# Patient Record
Sex: MALE | Race: WHITE | Employment: OTHER | URBAN - METROPOLITAN AREA
[De-identification: names, ages, dates, MRNs, and addresses within clinical notes are randomized per-mention and may not be internally consistent; named-entity substitution may affect disease eponyms.]

---

## 2019-02-15 ENCOUNTER — HOSPITAL ENCOUNTER (EMERGENCY)
Age: 74
Discharge: HOME | End: 2019-02-16
Attending: EMERGENCY MEDICINE | Admitting: EMERGENCY MEDICINE
Payer: MEDICARE

## 2019-02-15 ENCOUNTER — APPOINTMENT (OUTPATIENT)
Dept: CT IMAGING | Age: 74
End: 2019-02-15
Attending: EMERGENCY MEDICINE
Payer: MEDICARE

## 2019-02-15 DIAGNOSIS — K58.0 IRRITABLE BOWEL SYNDROME WITH DIARRHEA: Primary | ICD-10-CM

## 2019-02-15 LAB
COMMENT, HOLDF: NORMAL
ERYTHROCYTE [DISTWIDTH] IN BLOOD BY AUTOMATED COUNT: 18.8 % (ref 11.5–14.5)
HCT VFR BLD AUTO: 39.2 % (ref 36.6–50.3)
HGB BLD-MCNC: 12.1 G/DL (ref 12.1–17)
MCH RBC QN AUTO: 26 PG (ref 26–34)
MCHC RBC AUTO-ENTMCNC: 30.9 G/DL (ref 30–36.5)
MCV RBC AUTO: 84.3 FL (ref 80–99)
NRBC # BLD: 0 K/UL (ref 0–0.01)
NRBC BLD-RTO: 0 PER 100 WBC
PLATELET # BLD AUTO: 175 K/UL (ref 150–400)
PMV BLD AUTO: 12.7 FL (ref 8.9–12.9)
RBC # BLD AUTO: 4.65 M/UL (ref 4.1–5.7)
SAMPLES BEING HELD,HOLD: NORMAL
WBC # BLD AUTO: 8.2 K/UL (ref 4.1–11.1)

## 2019-02-15 PROCEDURE — 80053 COMPREHEN METABOLIC PANEL: CPT

## 2019-02-15 PROCEDURE — 36415 COLL VENOUS BLD VENIPUNCTURE: CPT

## 2019-02-15 PROCEDURE — 96374 THER/PROPH/DIAG INJ IV PUSH: CPT

## 2019-02-15 PROCEDURE — 83690 ASSAY OF LIPASE: CPT

## 2019-02-15 PROCEDURE — 96361 HYDRATE IV INFUSION ADD-ON: CPT

## 2019-02-15 PROCEDURE — 74176 CT ABD & PELVIS W/O CONTRAST: CPT

## 2019-02-15 PROCEDURE — 99284 EMERGENCY DEPT VISIT MOD MDM: CPT

## 2019-02-15 PROCEDURE — 85027 COMPLETE CBC AUTOMATED: CPT

## 2019-02-15 PROCEDURE — 74011250636 HC RX REV CODE- 250/636

## 2019-02-15 PROCEDURE — 74011250636 HC RX REV CODE- 250/636: Performed by: EMERGENCY MEDICINE

## 2019-02-15 RX ORDER — ONDANSETRON 2 MG/ML
4 INJECTION INTRAMUSCULAR; INTRAVENOUS ONCE
Status: COMPLETED | OUTPATIENT
Start: 2019-02-15 | End: 2019-02-15

## 2019-02-15 RX ORDER — ONDANSETRON 2 MG/ML
INJECTION INTRAMUSCULAR; INTRAVENOUS
Status: COMPLETED
Start: 2019-02-15 | End: 2019-02-15

## 2019-02-15 RX ADMIN — SODIUM CHLORIDE 1000 ML: 900 INJECTION, SOLUTION INTRAVENOUS at 23:21

## 2019-02-15 RX ADMIN — ONDANSETRON 4 MG: 2 INJECTION INTRAMUSCULAR; INTRAVENOUS at 23:20

## 2019-02-16 VITALS
HEIGHT: 67 IN | WEIGHT: 240 LBS | DIASTOLIC BLOOD PRESSURE: 64 MMHG | RESPIRATION RATE: 16 BRPM | SYSTOLIC BLOOD PRESSURE: 136 MMHG | TEMPERATURE: 97.8 F | BODY MASS INDEX: 37.67 KG/M2 | HEART RATE: 98 BPM | OXYGEN SATURATION: 94 %

## 2019-02-16 LAB
ALBUMIN SERPL-MCNC: 2.9 G/DL (ref 3.5–5)
ALBUMIN/GLOB SERPL: 0.6 {RATIO} (ref 1.1–2.2)
ALP SERPL-CCNC: 120 U/L (ref 45–117)
ALT SERPL-CCNC: 46 U/L (ref 12–78)
ANION GAP SERPL CALC-SCNC: 14 MMOL/L (ref 5–15)
AST SERPL-CCNC: 50 U/L (ref 15–37)
BILIRUB SERPL-MCNC: 0.5 MG/DL (ref 0.2–1)
BUN SERPL-MCNC: 13 MG/DL (ref 6–20)
BUN/CREAT SERPL: 10 (ref 12–20)
CALCIUM SERPL-MCNC: 10.9 MG/DL (ref 8.5–10.1)
CHLORIDE SERPL-SCNC: 105 MMOL/L (ref 97–108)
CO2 SERPL-SCNC: 21 MMOL/L (ref 21–32)
CREAT SERPL-MCNC: 1.26 MG/DL (ref 0.7–1.3)
GLOBULIN SER CALC-MCNC: 4.5 G/DL (ref 2–4)
GLUCOSE SERPL-MCNC: 113 MG/DL (ref 65–100)
LIPASE SERPL-CCNC: 133 U/L (ref 73–393)
POTASSIUM SERPL-SCNC: 3.5 MMOL/L (ref 3.5–5.1)
PROT SERPL-MCNC: 7.4 G/DL (ref 6.4–8.2)
SODIUM SERPL-SCNC: 140 MMOL/L (ref 136–145)

## 2019-02-16 RX ORDER — ONDANSETRON 4 MG/1
4 TABLET, ORALLY DISINTEGRATING ORAL
Qty: 8 TAB | Refills: 0 | Status: SHIPPED | OUTPATIENT
Start: 2019-02-16 | End: 2020-04-28

## 2019-02-16 NOTE — ED TRIAGE NOTES
Pt arrives via EMS from home c/o lower abdominal pain with nausea and vomiting. Pt has a hx of IBS and other bowel issues.

## 2019-02-16 NOTE — DISCHARGE INSTRUCTIONS
Patient Education        Irritable Bowel Syndrome: Care Instructions  Your Care Instructions  Irritable bowel syndrome, or IBS, is a problem with the intestines that causes belly pain, bloating, gas, constipation, and diarrhea. The cause of IBS is not well known. IBS can last for many years, but it does not get worse over time or lead to serious disease. Most people can control their symptoms by changing their diet and reducing stress. Follow-up care is a key part of your treatment and safety. Be sure to make and go to all appointments, and call your doctor if you are having problems. It's also a good idea to know your test results and keep a list of the medicines you take. How can you care for yourself at home? · For constipation:  ? Include fruits, vegetables, beans, and whole grains in your diet each day. These foods are high in fiber. ? Drink plenty of fluids, enough so that your urine is light yellow or clear like water. If you have kidney, heart, or liver disease and have to limit fluids, talk with your doctor before you increase the amount of fluids you drink. ? Get some exercise every day. Build up slowly to 30 to 60 minutes a day on 5 or more days of the week. ? Take a fiber supplement, such as Citrucel or Metamucil, every day if needed. Read and follow all instructions on the label. ? Schedule time each day for a bowel movement. Having a daily routine may help. Take your time and do not strain when having a bowel movement. · If you often have diarrhea, limit foods and drinks that make it worse. These are different for each person but may include caffeine (found in coffee, tea, chocolate, and cola drinks), alcohol, fatty foods, gas-producing foods (such as beans, cabbage, and broccoli), some dairy products, and spicy foods. Do not eat candy or gum that contains sorbitol. · Keep a daily diary of what you eat and what symptoms you have. This may help find foods that cause you problems.   · Eat slowly. Try to make mealtime relaxing. · Find ways to reduce stress. · Get at least 30 minutes of exercise on most days of the week. Exercise can help reduce tension and prevent constipation. Walking is a good choice. You also may want to do other activities, such as running, swimming, cycling, or playing tennis or team sports. When should you call for help? Call your doctor now or seek immediate medical care if:    · Your pain is different than usual or occurs with fever.     · You lose weight without trying, or you lose your appetite and you do not know why.     · Your symptoms often wake you from sleep.     · Your stools are black and tarlike or have streaks of blood.    Watch closely for changes in your health, and be sure to contact your doctor if:    · Your IBS symptoms get worse or begin to disrupt your day-to-day life.     · You become more tired than usual.     · Your home treatment stops working. Where can you learn more? Go to http://aaron-jessica.info/. Enter B172 in the search box to learn more about \"Irritable Bowel Syndrome: Care Instructions. \"  Current as of: March 27, 2018  Content Version: 11.9  © 9948-8606 [employer]. Care instructions adapted under license by [employer] (which disclaims liability or warranty for this information). If you have questions about a medical condition or this instruction, always ask your healthcare professional. [address] any warranty or liability for your use of this information.

## 2019-02-16 NOTE — ED PROVIDER NOTES
75 y.o. male with past medical history significant for IBS and pshx of cholecystectomy who presents to the ED via EMS with chief complaint of 8/10 generalized abd pain with accompanying nausea, diarrhea and constipation, onset 30 days ago. No modifying factors noted. He notes that he has had diarrhea all day, and has been taking IBgard w/o resolution. Pt also notes that he has been taking Zofran w/o resolution of sx. He states that the abd pain became so unbearable that he had to call EMS. He denies fever, vomiting and hx of heart dz. He notes that he moved here from [place] 3 weeks ago to be close to his son. Pt lives at home alone and has no doctors here in [place]. There are no other acute medical concerns at this time. PCP: No primary care provider on file. Note written by Molly Reid, as dictated by Ondina Frost MD 10:11 PM  
 
 
The history is provided by the patient. No  was used. No past medical history on file. No past surgical history on file. No family history on file. Social History Socioeconomic History  Marital status: Not on file Spouse name: Not on file  Number of children: Not on file  Years of education: Not on file  Highest education level: Not on file Social Needs  Financial resource strain: Not on file  Food insecurity - worry: Not on file  Food insecurity - inability: Not on file  Transportation needs - medical: Not on file  Transportation needs - non-medical: Not on file Occupational History  Not on file Tobacco Use  Smoking status: Not on file Substance and Sexual Activity  Alcohol use: Not on file  Drug use: Not on file  Sexual activity: Not on file Other Topics Concern  Not on file Social History Narrative  Not on file ALLERGIES: Patient has no known allergies. Review of Systems Constitutional: Negative for diaphoresis and fever. HENT: Negative for facial swelling. Eyes: Negative for visual disturbance. Respiratory: Negative for cough. Cardiovascular: Negative for chest pain. Gastrointestinal: Positive for abdominal pain, constipation, diarrhea and vomiting. Genitourinary: Negative for dysuria. Musculoskeletal: Negative for joint swelling. Skin: Negative for rash. Neurological: Positive for light-headedness. Negative for headaches. Hematological: Negative for adenopathy. Psychiatric/Behavioral: Negative for suicidal ideas. There were no vitals filed for this visit. Physical Exam  
Constitutional: He is oriented to person, place, and time. He appears well-developed and well-nourished. No distress. Obese HENT:  
Head: Normocephalic and atraumatic. Mouth/Throat: Oropharynx is clear and moist.  
Eyes: Pupils are equal, round, and reactive to light. Neck: Normal range of motion. Neck supple. Cardiovascular: Normal rate, regular rhythm, normal heart sounds and intact distal pulses. Pulmonary/Chest: Effort normal and breath sounds normal. No respiratory distress. Abdominal: Soft. Bowel sounds are normal. He exhibits no distension. There is no tenderness. Musculoskeletal: Normal range of motion. He exhibits no edema. Neurological: He is alert and oriented to person, place, and time. Skin: Skin is warm and dry. Nursing note and vitals reviewed. Note written by Molly Murphy, as dictated by Claudette Lamb MD 10:11 PM  
 
MDM Number of Diagnoses or Management Options Irritable bowel syndrome with diarrhea:  
Diagnosis management comments: A:  Chronic diarrhea. Labs and CT unremarkable. P: 
Given info for PCP's 
F/u with GI 
 
 
  
 
Procedures

## 2019-03-05 ENCOUNTER — APPOINTMENT (OUTPATIENT)
Dept: GENERAL RADIOLOGY | Age: 74
DRG: 689 | End: 2019-03-05
Attending: EMERGENCY MEDICINE
Payer: MEDICARE

## 2019-03-05 ENCOUNTER — HOSPITAL ENCOUNTER (INPATIENT)
Age: 74
LOS: 1 days | Discharge: HOME | DRG: 689 | End: 2019-03-07
Attending: EMERGENCY MEDICINE | Admitting: INTERNAL MEDICINE
Payer: MEDICARE

## 2019-03-05 ENCOUNTER — APPOINTMENT (OUTPATIENT)
Dept: CT IMAGING | Age: 74
DRG: 689 | End: 2019-03-05
Attending: EMERGENCY MEDICINE
Payer: MEDICARE

## 2019-03-05 DIAGNOSIS — R41.82 ALTERED MENTAL STATUS, UNSPECIFIED ALTERED MENTAL STATUS TYPE: Primary | ICD-10-CM

## 2019-03-05 LAB
ALBUMIN SERPL-MCNC: 3 G/DL (ref 3.5–5)
ALBUMIN/GLOB SERPL: 0.7 {RATIO} (ref 1.1–2.2)
ALP SERPL-CCNC: 137 U/L (ref 45–117)
ALT SERPL-CCNC: 49 U/L (ref 12–78)
ANION GAP SERPL CALC-SCNC: 12 MMOL/L (ref 5–15)
AST SERPL-CCNC: 44 U/L (ref 15–37)
BASOPHILS # BLD: 0 K/UL (ref 0–0.1)
BASOPHILS NFR BLD: 0 % (ref 0–1)
BILIRUB SERPL-MCNC: 0.4 MG/DL (ref 0.2–1)
BUN SERPL-MCNC: 15 MG/DL (ref 6–20)
BUN/CREAT SERPL: 14 (ref 12–20)
CALCIUM SERPL-MCNC: 9.2 MG/DL (ref 8.5–10.1)
CHLORIDE SERPL-SCNC: 106 MMOL/L (ref 97–108)
CK SERPL-CCNC: 70 U/L (ref 39–308)
CO2 SERPL-SCNC: 24 MMOL/L (ref 21–32)
COMMENT, HOLDF: NORMAL
CREAT SERPL-MCNC: 1.11 MG/DL (ref 0.7–1.3)
DIFFERENTIAL METHOD BLD: ABNORMAL
EOSINOPHIL # BLD: 0.2 K/UL (ref 0–0.4)
EOSINOPHIL NFR BLD: 2 % (ref 0–7)
ERYTHROCYTE [DISTWIDTH] IN BLOOD BY AUTOMATED COUNT: 19.6 % (ref 11.5–14.5)
GLOBULIN SER CALC-MCNC: 4.6 G/DL (ref 2–4)
GLUCOSE BLD STRIP.AUTO-MCNC: 100 MG/DL (ref 65–100)
GLUCOSE SERPL-MCNC: 113 MG/DL (ref 65–100)
HCT VFR BLD AUTO: 42.8 % (ref 36.6–50.3)
HGB BLD-MCNC: 13.3 G/DL (ref 12.1–17)
IMM GRANULOCYTES # BLD AUTO: 0 K/UL (ref 0–0.04)
IMM GRANULOCYTES NFR BLD AUTO: 0 % (ref 0–0.5)
INR PPP: 1.1 (ref 0.9–1.1)
LACTATE SERPL-SCNC: 1.8 MMOL/L (ref 0.4–2)
LYMPHOCYTES # BLD: 2.6 K/UL (ref 0.8–3.5)
LYMPHOCYTES NFR BLD: 26 % (ref 12–49)
MCH RBC QN AUTO: 25.9 PG (ref 26–34)
MCHC RBC AUTO-ENTMCNC: 31.1 G/DL (ref 30–36.5)
MCV RBC AUTO: 83.4 FL (ref 80–99)
MONOCYTES # BLD: 1.5 K/UL (ref 0–1)
MONOCYTES NFR BLD: 15 % (ref 5–13)
NEUTS SEG # BLD: 5.5 K/UL (ref 1.8–8)
NEUTS SEG NFR BLD: 57 % (ref 32–75)
NRBC # BLD: 0 K/UL (ref 0–0.01)
NRBC BLD-RTO: 0 PER 100 WBC
PLATELET # BLD AUTO: 189 K/UL (ref 150–400)
PMV BLD AUTO: 11.5 FL (ref 8.9–12.9)
POTASSIUM SERPL-SCNC: 3.5 MMOL/L (ref 3.5–5.1)
PROT SERPL-MCNC: 7.6 G/DL (ref 6.4–8.2)
PROTHROMBIN TIME: 11.4 SEC (ref 9–11.1)
RBC # BLD AUTO: 5.13 M/UL (ref 4.1–5.7)
SAMPLES BEING HELD,HOLD: NORMAL
SERVICE CMNT-IMP: NORMAL
SODIUM SERPL-SCNC: 142 MMOL/L (ref 136–145)
TROPONIN I SERPL-MCNC: <0.05 NG/ML
WBC # BLD AUTO: 9.8 K/UL (ref 4.1–11.1)

## 2019-03-05 PROCEDURE — 83605 ASSAY OF LACTIC ACID: CPT

## 2019-03-05 PROCEDURE — 99285 EMERGENCY DEPT VISIT HI MDM: CPT

## 2019-03-05 PROCEDURE — 80053 COMPREHEN METABOLIC PANEL: CPT

## 2019-03-05 PROCEDURE — 71045 X-RAY EXAM CHEST 1 VIEW: CPT

## 2019-03-05 PROCEDURE — 87040 BLOOD CULTURE FOR BACTERIA: CPT

## 2019-03-05 PROCEDURE — 93005 ELECTROCARDIOGRAM TRACING: CPT

## 2019-03-05 PROCEDURE — 70450 CT HEAD/BRAIN W/O DYE: CPT

## 2019-03-05 PROCEDURE — 96375 TX/PRO/DX INJ NEW DRUG ADDON: CPT

## 2019-03-05 PROCEDURE — 84443 ASSAY THYROID STIM HORMONE: CPT

## 2019-03-05 PROCEDURE — 85610 PROTHROMBIN TIME: CPT

## 2019-03-05 PROCEDURE — 96365 THER/PROPH/DIAG IV INF INIT: CPT

## 2019-03-05 PROCEDURE — 84484 ASSAY OF TROPONIN QUANT: CPT

## 2019-03-05 PROCEDURE — 36415 COLL VENOUS BLD VENIPUNCTURE: CPT

## 2019-03-05 PROCEDURE — 85025 COMPLETE CBC W/AUTO DIFF WBC: CPT

## 2019-03-05 PROCEDURE — 82550 ASSAY OF CK (CPK): CPT

## 2019-03-05 PROCEDURE — 82962 GLUCOSE BLOOD TEST: CPT

## 2019-03-06 ENCOUNTER — APPOINTMENT (OUTPATIENT)
Dept: CT IMAGING | Age: 74
DRG: 689 | End: 2019-03-06
Attending: FAMILY MEDICINE
Payer: MEDICARE

## 2019-03-06 PROBLEM — R41.82 ALTERED MENTAL STATUS: Status: ACTIVE | Noted: 2019-03-06

## 2019-03-06 LAB
ALBUMIN SERPL-MCNC: 2.8 G/DL (ref 3.5–5)
ALBUMIN/GLOB SERPL: 0.7 {RATIO} (ref 1.1–2.2)
ALP SERPL-CCNC: 126 U/L (ref 45–117)
ALT SERPL-CCNC: 44 U/L (ref 12–78)
AMMONIA PLAS-SCNC: 82 UMOL/L
ANION GAP SERPL CALC-SCNC: 12 MMOL/L (ref 5–15)
APPEARANCE UR: ABNORMAL
AST SERPL-CCNC: 42 U/L (ref 15–37)
ATRIAL RATE: 101 BPM
BACTERIA URNS QL MICRO: ABNORMAL /HPF
BASOPHILS # BLD: 0 K/UL (ref 0–0.1)
BASOPHILS NFR BLD: 0 % (ref 0–1)
BILIRUB DIRECT SERPL-MCNC: 0.1 MG/DL (ref 0–0.2)
BILIRUB SERPL-MCNC: 0.3 MG/DL (ref 0.2–1)
BILIRUB UR QL: NEGATIVE
BUN SERPL-MCNC: 15 MG/DL (ref 6–20)
BUN/CREAT SERPL: 15 (ref 12–20)
CALCIUM SERPL-MCNC: 9 MG/DL (ref 8.5–10.1)
CALCULATED P AXIS, ECG09: 27 DEGREES
CALCULATED R AXIS, ECG10: -79 DEGREES
CALCULATED T AXIS, ECG11: 30 DEGREES
CHLORIDE SERPL-SCNC: 110 MMOL/L (ref 97–108)
CK MB CFR SERPL CALC: 1.9 % (ref 0–2.5)
CK MB SERPL-MCNC: 1.3 NG/ML (ref 5–25)
CK SERPL-CCNC: 68 U/L (ref 39–308)
CO2 SERPL-SCNC: 21 MMOL/L (ref 21–32)
COLOR UR: ABNORMAL
CREAT SERPL-MCNC: 0.97 MG/DL (ref 0.7–1.3)
DIAGNOSIS, 93000: NORMAL
DIFFERENTIAL METHOD BLD: ABNORMAL
EOSINOPHIL # BLD: 0.3 K/UL (ref 0–0.4)
EOSINOPHIL NFR BLD: 3 % (ref 0–7)
EPITH CASTS URNS QL MICRO: ABNORMAL /LPF
ERYTHROCYTE [DISTWIDTH] IN BLOOD BY AUTOMATED COUNT: 19.3 % (ref 11.5–14.5)
GLOBULIN SER CALC-MCNC: 4.2 G/DL (ref 2–4)
GLUCOSE SERPL-MCNC: 97 MG/DL (ref 65–100)
GLUCOSE UR STRIP.AUTO-MCNC: NEGATIVE MG/DL
HCT VFR BLD AUTO: 39.5 % (ref 36.6–50.3)
HGB BLD-MCNC: 12.6 G/DL (ref 12.1–17)
HGB UR QL STRIP: ABNORMAL
IMM GRANULOCYTES # BLD AUTO: 0 K/UL (ref 0–0.04)
IMM GRANULOCYTES NFR BLD AUTO: 0 % (ref 0–0.5)
KETONES UR QL STRIP.AUTO: ABNORMAL MG/DL
LEUKOCYTE ESTERASE UR QL STRIP.AUTO: ABNORMAL
LIPASE SERPL-CCNC: 114 U/L (ref 73–393)
LYMPHOCYTES # BLD: 2.9 K/UL (ref 0.8–3.5)
LYMPHOCYTES NFR BLD: 30 % (ref 12–49)
MAGNESIUM SERPL-MCNC: 1.9 MG/DL (ref 1.6–2.4)
MCH RBC QN AUTO: 26.8 PG (ref 26–34)
MCHC RBC AUTO-ENTMCNC: 31.9 G/DL (ref 30–36.5)
MCV RBC AUTO: 84 FL (ref 80–99)
MONOCYTES # BLD: 1.4 K/UL (ref 0–1)
MONOCYTES NFR BLD: 15 % (ref 5–13)
NEUTS SEG # BLD: 5.2 K/UL (ref 1.8–8)
NEUTS SEG NFR BLD: 53 % (ref 32–75)
NITRITE UR QL STRIP.AUTO: NEGATIVE
NRBC # BLD: 0 K/UL (ref 0–0.01)
NRBC BLD-RTO: 0 PER 100 WBC
P-R INTERVAL, ECG05: 146 MS
PH UR STRIP: 7 [PH] (ref 5–8)
PHOSPHATE SERPL-MCNC: 2.9 MG/DL (ref 2.6–4.7)
PLATELET # BLD AUTO: 182 K/UL (ref 150–400)
PMV BLD AUTO: 12 FL (ref 8.9–12.9)
POTASSIUM SERPL-SCNC: 3.2 MMOL/L (ref 3.5–5.1)
PROT SERPL-MCNC: 7 G/DL (ref 6.4–8.2)
PROT UR STRIP-MCNC: ABNORMAL MG/DL
Q-T INTERVAL, ECG07: 344 MS
QRS DURATION, ECG06: 88 MS
QTC CALCULATION (BEZET), ECG08: 446 MS
RBC # BLD AUTO: 4.7 M/UL (ref 4.1–5.7)
RBC #/AREA URNS HPF: ABNORMAL /HPF (ref 0–5)
SODIUM SERPL-SCNC: 143 MMOL/L (ref 136–145)
SP GR UR REFRACTOMETRY: 1.02 (ref 1–1.03)
TROPONIN I SERPL-MCNC: <0.05 NG/ML
TSH SERPL DL<=0.05 MIU/L-ACNC: 3.19 UIU/ML (ref 0.36–3.74)
UR CULT HOLD, URHOLD: NORMAL
UROBILINOGEN UR QL STRIP.AUTO: 0.2 EU/DL (ref 0.2–1)
VENTRICULAR RATE, ECG03: 101 BPM
WBC # BLD AUTO: 9.8 K/UL (ref 4.1–11.1)
WBC URNS QL MICRO: >100 /HPF (ref 0–4)

## 2019-03-06 PROCEDURE — 82550 ASSAY OF CK (CPK): CPT

## 2019-03-06 PROCEDURE — 99218 HC RM OBSERVATION: CPT

## 2019-03-06 PROCEDURE — 74011250636 HC RX REV CODE- 250/636: Performed by: FAMILY MEDICINE

## 2019-03-06 PROCEDURE — 74011000258 HC RX REV CODE- 258: Performed by: FAMILY MEDICINE

## 2019-03-06 PROCEDURE — 85025 COMPLETE CBC W/AUTO DIFF WBC: CPT

## 2019-03-06 PROCEDURE — 80076 HEPATIC FUNCTION PANEL: CPT

## 2019-03-06 PROCEDURE — 82140 ASSAY OF AMMONIA: CPT

## 2019-03-06 PROCEDURE — 87077 CULTURE AEROBIC IDENTIFY: CPT

## 2019-03-06 PROCEDURE — 83690 ASSAY OF LIPASE: CPT

## 2019-03-06 PROCEDURE — 84100 ASSAY OF PHOSPHORUS: CPT

## 2019-03-06 PROCEDURE — 80048 BASIC METABOLIC PNL TOTAL CA: CPT

## 2019-03-06 PROCEDURE — 84484 ASSAY OF TROPONIN QUANT: CPT

## 2019-03-06 PROCEDURE — 74176 CT ABD & PELVIS W/O CONTRAST: CPT

## 2019-03-06 PROCEDURE — 81001 URINALYSIS AUTO W/SCOPE: CPT

## 2019-03-06 PROCEDURE — 36415 COLL VENOUS BLD VENIPUNCTURE: CPT

## 2019-03-06 PROCEDURE — 74011250637 HC RX REV CODE- 250/637: Performed by: FAMILY MEDICINE

## 2019-03-06 PROCEDURE — 87086 URINE CULTURE/COLONY COUNT: CPT

## 2019-03-06 PROCEDURE — 83735 ASSAY OF MAGNESIUM: CPT

## 2019-03-06 RX ORDER — SODIUM CHLORIDE 9 MG/ML
75 INJECTION, SOLUTION INTRAVENOUS CONTINUOUS
Status: DISCONTINUED | OUTPATIENT
Start: 2019-03-06 | End: 2019-03-07 | Stop reason: HOSPADM

## 2019-03-06 RX ORDER — POTASSIUM CHLORIDE 750 MG/1
40 TABLET, FILM COATED, EXTENDED RELEASE ORAL
Status: COMPLETED | OUTPATIENT
Start: 2019-03-06 | End: 2019-03-06

## 2019-03-06 RX ORDER — SODIUM CHLORIDE 0.9 % (FLUSH) 0.9 %
5-40 SYRINGE (ML) INJECTION AS NEEDED
Status: DISCONTINUED | OUTPATIENT
Start: 2019-03-06 | End: 2019-03-07 | Stop reason: HOSPADM

## 2019-03-06 RX ORDER — SODIUM CHLORIDE 0.9 % (FLUSH) 0.9 %
5-40 SYRINGE (ML) INJECTION EVERY 8 HOURS
Status: DISCONTINUED | OUTPATIENT
Start: 2019-03-06 | End: 2019-03-07 | Stop reason: HOSPADM

## 2019-03-06 RX ORDER — MORPHINE SULFATE 2 MG/ML
1 INJECTION, SOLUTION INTRAMUSCULAR; INTRAVENOUS
Status: COMPLETED | OUTPATIENT
Start: 2019-03-06 | End: 2019-03-06

## 2019-03-06 RX ORDER — ONDANSETRON 2 MG/ML
4 INJECTION INTRAMUSCULAR; INTRAVENOUS
Status: DISCONTINUED | OUTPATIENT
Start: 2019-03-06 | End: 2019-03-07 | Stop reason: HOSPADM

## 2019-03-06 RX ORDER — POTASSIUM CHLORIDE 7.45 MG/ML
10 INJECTION INTRAVENOUS
Status: DISCONTINUED | OUTPATIENT
Start: 2019-03-06 | End: 2019-03-06

## 2019-03-06 RX ADMIN — LACTULOSE 20 G: 20 SOLUTION ORAL at 09:06

## 2019-03-06 RX ADMIN — Medication 10 ML: at 05:59

## 2019-03-06 RX ADMIN — POTASSIUM CHLORIDE 10 MEQ: 7.46 INJECTION, SOLUTION INTRAVENOUS at 03:58

## 2019-03-06 RX ADMIN — Medication 10 ML: at 16:06

## 2019-03-06 RX ADMIN — MORPHINE SULFATE 1 MG: 2 INJECTION, SOLUTION INTRAMUSCULAR; INTRAVENOUS at 05:56

## 2019-03-06 RX ADMIN — SODIUM CHLORIDE 75 ML/HR: 900 INJECTION, SOLUTION INTRAVENOUS at 09:42

## 2019-03-06 RX ADMIN — ONDANSETRON 4 MG: 2 INJECTION INTRAMUSCULAR; INTRAVENOUS at 05:56

## 2019-03-06 RX ADMIN — LACTULOSE 20 G: 20 SOLUTION ORAL at 21:19

## 2019-03-06 RX ADMIN — MORPHINE SULFATE 1 MG: 2 INJECTION, SOLUTION INTRAMUSCULAR; INTRAVENOUS at 21:18

## 2019-03-06 RX ADMIN — LACTULOSE 20 G: 20 SOLUTION ORAL at 16:05

## 2019-03-06 RX ADMIN — CEFTRIAXONE 1 G: 1 INJECTION, POWDER, FOR SOLUTION INTRAMUSCULAR; INTRAVENOUS at 03:58

## 2019-03-06 RX ADMIN — Medication 10 ML: at 04:04

## 2019-03-06 RX ADMIN — Medication 10 ML: at 21:19

## 2019-03-06 RX ADMIN — POTASSIUM CHLORIDE 40 MEQ: 750 TABLET, FILM COATED, EXTENDED RELEASE ORAL at 04:54

## 2019-03-06 NOTE — PROGRESS NOTES
TRANSFER - IN REPORT:    Verbal report received from Alison Mathew RN(name) on [employer]  being received from ED(unit) for routine progression of care      Report consisted of patients Situation, Background, Assessment and   Recommendations(SBAR). Information from the following report(s) SBAR, ED Summary, MAR, Recent Results and Med Rec Status was reviewed with the receiving nurse. Opportunity for questions and clarification was provided. Assessment completed upon patients arrival to unit and care assumed.

## 2019-03-06 NOTE — PROGRESS NOTES
Problem: Pressure Injury - Risk of  Goal: *Prevention of pressure injury  Document Murphy Scale and appropriate interventions in the flowsheet. Outcome: Progressing Towards Goal  Pressure Injury Interventions:  Sensory Interventions: Assess changes in LOC, Float heels, Keep linens dry and wrinkle-free, Minimize linen layers    Moisture Interventions: Absorbent underpads    Activity Interventions: Increase time out of bed    Mobility Interventions: Pressure redistribution bed/mattress (bed type)    Nutrition Interventions: Document food/fluid/supplement intake, Offer support with meals,snacks and hydration    Friction and Shear Interventions: Minimize layers               Problem: Falls - Risk of  Goal: *Absence of Falls  Document Edouard Fall Risk and appropriate interventions in the flowsheet.   Outcome: Progressing Towards Goal  Fall Risk Interventions:  Mobility Interventions: Communicate number of staff needed for ambulation/transfer, Patient to call before getting OOB         Medication Interventions: Patient to call before getting OOB, Teach patient to arise slowly    Elimination Interventions: Call light in reach, Patient to call for help with toileting needs, Toilet paper/wipes in reach

## 2019-03-06 NOTE — PROGRESS NOTES
TRANSFER - OUT REPORT:    Verbal report given to Michi RN(name) on [employer]  being transferred to 6 E(unit) for routine progression of care       Report consisted of patients Situation, Background, Assessment and   Recommendations(SBAR). Information from the following report(s) SBAR, Kardex, ED Summary, Intake/Output and MAR was reviewed with the receiving nurse. Lines:   Peripheral IV 03/05/19 Left Other(comment) (Active)   Site Assessment Clean, dry, & intact 3/6/2019 12:08 PM   Phlebitis Assessment 0 3/6/2019 12:08 PM   Infiltration Assessment 0 3/6/2019 12:08 PM   Dressing Status Clean, dry, & intact 3/6/2019 12:08 PM   Dressing Type Transparent 3/6/2019 12:08 PM   Hub Color/Line Status Green 3/6/2019 12:08 PM   Alcohol Cap Used Yes 3/6/2019 12:08 PM       Peripheral IV 03/05/19 Right Wrist (Active)   Site Assessment Clean, dry, & intact 3/6/2019 12:08 PM   Phlebitis Assessment 0 3/6/2019 12:08 PM   Infiltration Assessment 0 3/6/2019 12:08 PM   Dressing Status Clean, dry, & intact 3/6/2019 12:08 PM   Dressing Type Transparent 3/6/2019 12:08 PM   Hub Color/Line Status Pink 3/6/2019 12:08 PM   Action Taken Blood drawn 3/5/2019 10:34 PM   Alcohol Cap Used Yes 3/6/2019 12:08 PM        Opportunity for questions and clarification was provided.       Patient transported with:   [employer]

## 2019-03-06 NOTE — ED NOTES
2350 pt voided ~250ml clear, yellow urine in urinal independently; urine obtained and sent    0420 pt not tolerating IV potassium, paged Dr. Jesu Griffin, [facility] to give PO since pt passed dysphagia screening    0540 pt requesting assistance with urinal, 1mg Ativan, zofran, and something for belly pain; paged Dr. Jesu Griffin

## 2019-03-06 NOTE — PROGRESS NOTES
Problem: Falls - Risk of  Goal: *Absence of Falls  Document Edouard Fall Risk and appropriate interventions in the flowsheet.   Outcome: Progressing Towards Goal  Fall Risk Interventions:  Mobility Interventions: Communicate number of staff needed for ambulation/transfer         Medication Interventions: Patient to call before getting OOB    Elimination Interventions: Call light in reach

## 2019-03-06 NOTE — ED PROVIDER NOTES
75 y.o. male with PMHx significant for IBS who presents from home via EMS with chief complaint of altered mental status. EMS reports pt called 911 thinking he had called his son and police were sent to his apartment, where he was found laying in bed. Per EMS, pt lives alone at home and allegedly has a home health nurse who goes to his home 3x/week, but no one has seen her since 2/28/19. EMS reports that the pt has likely been mostly in bed and has not been eating or drinking. EMS reports the pt complained of back pain, abdominal pain, dysuria, chest pain, and headache. EMS reports the pt received 400 cc of NS en route and decreased his tachycardia from 130's to 100's. EMS reports his blood sugar was 146 and temperature was 98.6 F tympanic. EMS reports police tried to call his son and left a voicemail. Pt states that he has been drinking some water, but has not been eating. Pt denies any falls. Full history, physical exam, and ROS unable to be obtained due to:  AMS. There are no other acute medical concerns at this time    Note written by Molly Prabhakar, as dictated by Alie Benjamin MD 10:16 PM.          The history is provided by the patient and the EMS personnel. The history is limited by the condition of the patient. No  was used. No past medical history on file. Past Surgical History:   Procedure Laterality Date    HX KNEE REPLACEMENT      Pt stated         No family history on file.     Social History     Socioeconomic History    Marital status: SINGLE     Spouse name: Not on file    Number of children: Not on file    Years of education: Not on file    Highest education level: Not on file   Social Needs    Financial resource strain: Not on file    Food insecurity - worry: Not on file    Food insecurity - inability: Not on file    Transportation needs - medical: Not on file   [employer] needs - non-medical: Not on file   Occupational History    Not on file   Tobacco Use    Smoking status: Not on file   Substance and Sexual Activity    Alcohol use: Not on file    Drug use: Not on file    Sexual activity: Not on file   Other Topics Concern    Not on file   Social History Narrative    Not on file         ALLERGIES: Patient has no known allergies. Review of Systems   Unable to perform ROS: Mental status change       Vitals:    03/06/19 0600 03/06/19 0700 03/06/19 0805 03/06/19 1130   BP: 132/81 125/84 117/71 111/68   Pulse: 88 94 93 98   Resp: 21 20 16 21   Temp: 98.4 °F (36.9 °C)  98.2 °F (36.8 °C) 98.3 °F (36.8 °C)   SpO2: 94% 92% 93% 92%   Weight:       Height:                Physical Exam   Constitutional: He is oriented to person, place, and time. He appears well-developed and well-nourished. No distress. BIBEMS in bed sheets (soiled), NAD, AxOx2 (when asked year states '19' - unsure if he means 2019 or 23--, president is [facility]), speaking in complete sentences     HENT:   Head: Normocephalic and atraumatic. Mouth/Throat: Oropharynx is clear and moist. No oropharyngeal exudate. Cn intact    No facial droop/ slurred speech/ tongue deviation   Eyes: Conjunctivae and EOM are normal. Pupils are equal, round, and reactive to light. Right eye exhibits no discharge. Left eye exhibits no discharge. No scleral icterus. Neck: Normal range of motion. Neck supple. No JVD present. No tracheal deviation present. Cardiovascular: Normal rate, regular rhythm, normal heart sounds and intact distal pulses. Exam reveals no gallop and no friction rub. No murmur heard. Pulmonary/Chest: Effort normal and breath sounds normal. No stridor. No respiratory distress. He has no wheezes. He has no rales. He exhibits no tenderness. Abdominal: Soft. Bowel sounds are normal. He exhibits no distension and no mass. There is no tenderness. There is no rebound and no guarding. No hernia.    Genitourinary:   Genitourinary Comments: Pt denies urinary/ Testicular/ scrotal or penile  complaints   Musculoskeletal: Normal range of motion. He exhibits no edema, tenderness or deformity. Lymphadenopathy:     He has no cervical adenopathy. Neurological: He is alert and oriented to person, place, and time. He has normal strength. He displays normal reflexes. No cranial nerve deficit or sensory deficit. He exhibits normal muscle tone. He displays a negative Romberg sign. Coordination normal. GCS eye subscore is 4. GCS verbal subscore is 5. GCS motor subscore is 6. He displays no Babinski's sign on the right side. He displays no Babinski's sign on the left side. pt has motor/ CV/ Sensation grossly intact to all extremities, R = L in strength;   Skin: Skin is warm and dry. Capillary refill takes less than 2 seconds. No rash noted. No erythema. Psychiatric: He has a normal mood and affect. His mood appears not anxious. He is not agitated. Nursing note and vitals reviewed. MDM       Procedures    Chief Complaint   Patient presents with    Altered mental status       11:01 PM  The patients presenting problems have been discussed, and they are in agreement with the care plan formulated and outlined with them. I have encouraged them to ask questions as they arise throughout their visit. MEDICATIONS GIVEN:  Medications - No data to display    LABS REVIEWED:  Labs Reviewed   CBC WITH AUTOMATED DIFF - Abnormal; Notable for the following components:       Result Value    MCH 25.9 (*)     RDW 19.6 (*)     MONOCYTES 15 (*)     ABS.  MONOCYTES 1.5 (*)     All other components within normal limits   CULTURE, BLOOD, PAIRED   URINE CULTURE HOLD SAMPLE   TROPONIN I   METABOLIC PANEL, COMPREHENSIVE   PROTHROMBIN TIME + INR   LACTIC ACID   CK   SAMPLES BEING HELD   URINALYSIS W/MICROSCOPIC   GLUCOSE, POC       RADIOLOGY RESULTS:  The following have been ordered and reviewed:  _____________________________________________________________________  _____________________________________________________________________    EKG interpretation:   Rhythm: normal sinus rhythm; and regular . Rate (approx.): 100; Axis: LAD; P wave: normal; QRS interval: normal ; ST/T wave: normal; Negative acute significant segmental elevations/ no old    PROCEDURES:        CONSULTATIONS:       PROGRESS NOTES:      DIAGNOSIS:    1. Altered mental status, unspecified altered mental status type        PLAN:  1- AMS - unsure of etiology; will have evaluated for admission for further work-up/ monitoring/ CM as pt lives alone      ED COURSE: The patients hospital course has been uncomplicated. 11:32 PM  Patient is being evaluated for admission to the hospital by the hospitalist, Dr Jonel Fernández .  The results of their tests and reasons for their admission have been discussed with them and/or available family. They convey agreement and understanding for the need to be admitted and for their admission diagnosis. Consultation has been made with the inpatient physician specialist for hospitalization.

## 2019-03-06 NOTE — PROGRESS NOTES
Hospitalist Progress Note                               Jamey Schwartz MD                                     Answering service: [phone]                               OR [phone] from in house phone                                         Date of Service:  3/6/2019  NAME:  Holly Luong  :  1945  MRN:  [id number]      Admission Summary:     76 Y/O gentleman with a PMH significant for Irritable Bowel Syndrome was brought to the ER via EMS for altered mentation and abdominal pain. Reason for follow up:       CC: Altered mental status     Assessment & Plan:     1) CNS: Resolving Acute metabolic Encephalopathy due to severe symptomatic UTI and dehydration. CT head negative for acute intracranial findings. 2) ID: Severe Symptomatic UTI without any sepsis. Afebrile, cultures in progress. Empiric Ceftriaxone. 3) GI: Resoved  Generalized abdominal pain. CT ABD/Pelvis negative for any acute findings. 4) Resp: 6mm RML lung nodule seen on CT ABD/Pelvis. For outpatient F/U imaging. 5) Prophylaxis: DVT    6) Rehab: Debility. PT/OT eval    6) Directives: Full Code with a guarded prognosis. D/W patient. 7) Plan: Anticipate D/C on oral therapy in 1-2 days. Left message for patient's sister Marilynn at [phone]. D/W Nursing. Hospital Problems  Date Reviewed: 3/6/2019          Codes Class Noted POA    * (Principal) Altered mental status ICD-10-CM: R41.82  ICD-9-CM: 780.97  3/6/2019 Unknown              Physical Examination:      Last 24hrs VS reviewed since prior progress note. Most recent are:  Visit Vitals  /68 (BP 1 Location: Right arm, BP Patient Position: At rest)   Pulse 98   Temp 98.3 °F (36.8 °C)   Resp 21   Ht 5' 7\" (1.702 m)   Wt 108.9 kg (240 lb)   SpO2 92%   BMI 37.59 kg/m²           Constitutional:  No acute distress, cooperative, pleasant    HEENT: Head is a traumatic,  Un icteric sclera. Pink conjunctiva,no erythema or discharge. Oral mucous moist, oropharynx benign. Neck supple,    Resp:   Decreased air entry B/L. Limited exam due to poor effort   CV:  Regular rhythm, normal rate, no murmurs, gallops, rubs    GI:  Soft, non distended, non tender. normoactive bowel sounds, no hepatosplenomegaly    :  No CVA or suprapubic tenderness   Skin  :  No erythema,rash,bullae,dipigmentation     Musculoskeletal:  Bipedal edema. Neurologic:  Awake and alert. Intake/Output Summary (Last 24 hours) at 3/6/2019 1501  Last data filed at 3/6/2019 1208  Gross per 24 hour   Intake 531.25 ml   Output 1250 ml   Net -718.75 ml          Labs:     Recent Labs     03/06/19 0156 03/05/19 2238   WBC 9.8 9.8   HGB 12.6 13.3   HCT 39.5 42.8    189     Recent Labs     03/06/19 0156 03/05/19 2238    142   K 3.2* 3.5   * 106   CO2 21 24   BUN 15 15   CREA 0.97 1.11   GLU 97 113*   CA 9.0 9.2   MG 1.9  --    PHOS 2.9  --      Recent Labs     03/06/19 0150 03/05/19 2238   SGOT 42* 44*   ALT 44 49   * 137*   TBILI 0.3 0.4   TP 7.0 7.6   ALB 2.8* 3.0*   GLOB 4.2* 4.6*   LPSE 114  --      Recent Labs     03/05/19 2238   INR 1.1   PTP 11.4*      No results for input(s): FE, TIBC, PSAT, FERR in the last 72 hours. No results found for: FOL, RBCF   No results for input(s): PH, PCO2, PO2 in the last 72 hours.   Recent Labs     03/06/19  0908 03/06/19 0156 03/06/19 0150 03/05/19 2238   CPK  --   --  68 79   CKNDX  --   --  1.9  --    TROIQ <0.05 <0.05 <0.05 <0.05     No results found for: CHOL, CHOLX, CHLST, CHOLV, HDL, LDL, LDLC, DLDLP, TGLX, TRIGL, TRIGP, CHHD, CHHDX  Lab Results   Component Value Date/Time    Glucose (POC) 100 03/05/2019 10:43 PM     Lab Results   Component Value Date/Time    Color YELLOW/STRAW 03/06/2019 12:00 AM    Appearance CLOUDY (A) 03/06/2019 12:00 AM    Specific gravity 1.018 03/06/2019 12:00 AM    pH (UA) 7.0 03/06/2019 12:00 AM    Protein TRACE (A) 03/06/2019 12:00 AM    Glucose NEGATIVE 03/06/2019 12:00 AM    Ketone TRACE (A) 03/06/2019 12:00 AM    Bilirubin NEGATIVE  03/06/2019 12:00 AM    Urobilinogen 0.2 03/06/2019 12:00 AM    Nitrites NEGATIVE  03/06/2019 12:00 AM    Leukocyte Esterase LARGE (A) 03/06/2019 12:00 AM    Epithelial cells FEW 03/06/2019 12:00 AM    Bacteria 4+ (A) 03/06/2019 12:00 AM    WBC >100 (H) 03/06/2019 12:00 AM    RBC 5-10 03/06/2019 12:00 AM         Medications Reviewed:     Current Facility-Administered Medications   Medication Dose Route Frequency    sodium chloride (NS) flush 5-40 mL  5-40 mL IntraVENous Q8H    sodium chloride (NS) flush 5-40 mL  5-40 mL IntraVENous PRN    cefTRIAXone (ROCEPHIN) 1 g in 0.9% sodium chloride (MBP/ADV) 50 mL  1 g IntraVENous Q24H    lactulose (CHRONULAC) solution 20 g  20 g Oral TID    morphine injection 1 mg  1 mg IntraVENous Q6H PRN    ondansetron (ZOFRAN) injection 4 mg  4 mg IntraVENous Q6H PRN    0.9% sodium chloride infusion  75 mL/hr IntraVENous CONTINUOUS     Current Outpatient Medications   Medication Sig    ondansetron (ZOFRAN ODT) 4 mg disintegrating tablet Take 1 Tab by mouth every eight (8) hours as needed for Nausea.     ______________________________________________________________________  EXPECTED LENGTH OF STAY: - - -  ACTUAL LENGTH OF STAY:          0                 Malgorzata Rod MD

## 2019-03-06 NOTE — PROGRESS NOTES
Physical therapy consult received and acknowledged, chart reviewed, met with patient in the ED. Patient reports using a power scooter and performing own transfers at baseline. Patient is in the ED bed which will not lower to a wheelchair height. Physical therapy will defer evaluation until patient transfers to the floor and in a hospital bed to allow for assessment of transfers. Therapy will check back later today as able. 1530 - Patient remains in the ED. Therapy will check back tomorrow for evaluation.

## 2019-03-06 NOTE — ED TRIAGE NOTES
Pt arrives via EMS stretcher he was found in bed in apartment w/ AMS,  nobody had seen him since Thursday, he called 911; has received 400cc NS on arrival; VSS per EMS, blood sugar 146, temp 98.6 tympanic, Sinus Tach 130's on EMS arrival; c/o stomach, chest, back, head pain, and being thirsty; EMS reported that home health nurse is supposed to come a few times a week but no one has seen the RN; pt lives alone and police attempted to call pt's son who is in another city

## 2019-03-07 VITALS
RESPIRATION RATE: 18 BRPM | SYSTOLIC BLOOD PRESSURE: 116 MMHG | WEIGHT: 240 LBS | DIASTOLIC BLOOD PRESSURE: 79 MMHG | HEIGHT: 67 IN | HEART RATE: 95 BPM | OXYGEN SATURATION: 93 % | BODY MASS INDEX: 37.67 KG/M2 | TEMPERATURE: 97.7 F

## 2019-03-07 LAB
AMMONIA PLAS-SCNC: 44 UMOL/L
ANION GAP SERPL CALC-SCNC: 7 MMOL/L (ref 5–15)
BASOPHILS # BLD: 0 K/UL (ref 0–0.1)
BASOPHILS NFR BLD: 0 % (ref 0–1)
BLASTS NFR BLD MANUAL: 0 %
BUN SERPL-MCNC: 10 MG/DL (ref 6–20)
BUN/CREAT SERPL: 10 (ref 12–20)
CALCIUM SERPL-MCNC: 8.8 MG/DL (ref 8.5–10.1)
CHLORIDE SERPL-SCNC: 110 MMOL/L (ref 97–108)
CO2 SERPL-SCNC: 23 MMOL/L (ref 21–32)
CREAT SERPL-MCNC: 1.02 MG/DL (ref 0.7–1.3)
DIFFERENTIAL METHOD BLD: ABNORMAL
EOSINOPHIL # BLD: 0.2 K/UL (ref 0–0.4)
EOSINOPHIL NFR BLD: 3 % (ref 0–7)
ERYTHROCYTE [DISTWIDTH] IN BLOOD BY AUTOMATED COUNT: 19.2 % (ref 11.5–14.5)
GLUCOSE SERPL-MCNC: 122 MG/DL (ref 65–100)
HCT VFR BLD AUTO: 39.7 % (ref 36.6–50.3)
HGB BLD-MCNC: 11.9 G/DL (ref 12.1–17)
IMM GRANULOCYTES # BLD AUTO: 0 K/UL
IMM GRANULOCYTES NFR BLD AUTO: 0 %
LYMPHOCYTES # BLD: 1.5 K/UL (ref 0.8–3.5)
LYMPHOCYTES NFR BLD: 20 % (ref 12–49)
MCH RBC QN AUTO: 25.9 PG (ref 26–34)
MCHC RBC AUTO-ENTMCNC: 30 G/DL (ref 30–36.5)
MCV RBC AUTO: 86.3 FL (ref 80–99)
METAMYELOCYTES NFR BLD MANUAL: 0 %
MONOCYTES # BLD: 0.9 K/UL (ref 0–1)
MONOCYTES NFR BLD: 12 % (ref 5–13)
MYELOCYTES NFR BLD MANUAL: 0 %
NEUTS BAND NFR BLD MANUAL: 1 % (ref 0–6)
NEUTS SEG # BLD: 5.1 K/UL (ref 1.8–8)
NEUTS SEG NFR BLD: 64 % (ref 32–75)
NRBC # BLD: 0 K/UL (ref 0–0.01)
NRBC BLD-RTO: 0 PER 100 WBC
OTHER CELLS NFR BLD MANUAL: 0 %
PLATELET # BLD AUTO: 157 K/UL (ref 150–400)
PLATELET COMMENTS,PCOM: ABNORMAL
PMV BLD AUTO: 11.8 FL (ref 8.9–12.9)
POTASSIUM SERPL-SCNC: 4 MMOL/L (ref 3.5–5.1)
PROMYELOCYTES NFR BLD MANUAL: 0 %
RBC # BLD AUTO: 4.6 M/UL (ref 4.1–5.7)
RBC MORPH BLD: ABNORMAL
RBC MORPH BLD: ABNORMAL
SODIUM SERPL-SCNC: 140 MMOL/L (ref 136–145)
WBC # BLD AUTO: 7.7 K/UL (ref 4.1–11.1)

## 2019-03-07 PROCEDURE — 99218 HC RM OBSERVATION: CPT

## 2019-03-07 PROCEDURE — 85027 COMPLETE CBC AUTOMATED: CPT

## 2019-03-07 PROCEDURE — 65270000029 HC RM PRIVATE

## 2019-03-07 PROCEDURE — 82140 ASSAY OF AMMONIA: CPT

## 2019-03-07 PROCEDURE — 74011250637 HC RX REV CODE- 250/637: Performed by: FAMILY MEDICINE

## 2019-03-07 PROCEDURE — 74011000258 HC RX REV CODE- 258: Performed by: FAMILY MEDICINE

## 2019-03-07 PROCEDURE — 74011250636 HC RX REV CODE- 250/636: Performed by: FAMILY MEDICINE

## 2019-03-07 PROCEDURE — 97161 PT EVAL LOW COMPLEX 20 MIN: CPT

## 2019-03-07 PROCEDURE — 80048 BASIC METABOLIC PNL TOTAL CA: CPT

## 2019-03-07 PROCEDURE — 36415 COLL VENOUS BLD VENIPUNCTURE: CPT

## 2019-03-07 RX ORDER — AMOXICILLIN 500 MG/1
500 TABLET, FILM COATED ORAL 3 TIMES DAILY
Qty: 30 TAB | Refills: 0 | Status: SHIPPED | OUTPATIENT
Start: 2019-03-07 | End: 2020-05-30

## 2019-03-07 RX ADMIN — LACTULOSE 20 G: 20 SOLUTION ORAL at 08:10

## 2019-03-07 RX ADMIN — Medication 10 ML: at 05:39

## 2019-03-07 RX ADMIN — SODIUM CHLORIDE 75 ML/HR: 900 INJECTION, SOLUTION INTRAVENOUS at 06:56

## 2019-03-07 RX ADMIN — ONDANSETRON 4 MG: 2 INJECTION INTRAMUSCULAR; INTRAVENOUS at 05:38

## 2019-03-07 RX ADMIN — CEFTRIAXONE 1 G: 1 INJECTION, POWDER, FOR SOLUTION INTRAMUSCULAR; INTRAVENOUS at 03:43

## 2019-03-07 NOTE — DISCHARGE INSTRUCTIONS
Patient to follow a regular diet as tolerated. Patient to ambulate as tolerated. Patient to be kept adequately hydrated at all times. Patient to follow up with PCP as scheduled 3/6/19 at 2 pm.  Recommendations for patient to be referred to a Urologist for further evaluation of the recurrent UTIs. PENDING TEST for follow up on discharge include the finalized Urine culture. Preliminary culture with growth of Aerococcus.

## 2019-03-07 NOTE — DISCHARGE SUMMARY
Michi Warren 2906 SUMMARY    Name:  Alexa Bella  MR#:  [id number]  :  1945  ACCOUNT #:  [de-identified]  ADMIT DATE:  2019  DISCHARGE DATE:  2019      MAIN DIAGNOSIS ON ADMISSION:  Acute metabolic encephalopathy due to significant symptomatic urinary tract infection and dehydration. MAIN DIAGNOSES ON DISCHARGE:  1. Resolved acute metabolic encephalopathy due to severe symptomatic urinary tract infection and dehydration. 2. Probable Aerococcus positive urinary tract infection. 3.  Resolved dehydration. HOSPITAL COURSE:  A 79-year-old gentleman with a past medical history significant for irritable bowel syndrome and not on any significant oral medications,  was brought to the emergency room by EMS for altered mentation and some nonspecific abdominal pain. The patient's initial hospital course was significant for being admitted to the floor after being evaluated with a metabolic panel that showed a sodium of 143, potassium 3.2, chloride 110, bicarbonate 21, BUN of 15, creatinine 0.97, glucose of 97, anion gap 12, calcium 9, phosphorus 2.9, magnesium 1.9. First troponin of 0.05. Ammonia level of 82. CBC with a WBC of 9.8, hemoglobin 12.6, hematocrit 39.5, platelet count 447, 24% neutrophils. A urinalysis with large leukocyte esterase, negative nitrites, wbc's in urine of greater than 100, bacteria 4+. A 12-lead EKG that showed sinus tachycardia at 101 per minute. Chest x-ray that showed no acute findings. CT of the head without contrast which did not show any acute infarcts, hemorrhages, tumors, or midline shift. A CT of the abdomen and pelvis without contrast that showed no acute findings in the abdomen or pelvis, cirrhosis, subpleural reticulation suggesting interstitial lung disease which could be further evaluated with a high resolution CT of the chest, and a 6 mm right middle lobe pulmonary nodule.   The patient was treated with aggressive IV fluids with monitoring of the BUN and creatinine, underwent blood cultures and urine culture testing with preliminary urine culture showing greater than 100,000 colony forming units per mL of possible Aerococcus species. This result was discussed with on-call infectious disease physician, Dr. Jose Manuel Rodrigues, who recommended discharge on amoxicillin 500 mg p.o. three times a day for 10 days due to the patient's significant improvement on the ceftriaxone 1 g IV daily since admission. The patient and the patient's sister were counseled on the importance of having outpatient followup of the finalized urine culture results with Primary Care Physician to make adjustments in  oral antibiotic therapy. The patient was evaluated by PT/OT service and was able to ambulate unaided. The patient was able to tolerate an advance in regular diet and at the urging of sister Katiana Clark being very anxious about patient not missing his initial Primary care Physician appointment 3/7/19 14:00, reassessed and was deemed stable for discharge to home. PHYSICAL EXAMINATION:  Examination findings on this day of discharge as follows:  GENERAL:  The patient awake, alert, denying new complaints. VITAL SIGNS:  Blood pressure of 116/79, heart rate 95, respiratory rate 18, afebrile, saturating 93% on room air. HEENT:  Head exam, normocephalic. Pupils equal and reactive to light. ENT exam, ears, nose, throat clear. CARDIOVASCULAR:  S1, S2 present. RESPIRATORY:  Lungs with decreased air entry at both bases without any crepitations or rhonchi. GI:  Bowel sounds present. The abdomen is soft, nontender. No rebound, no guarding. GENITOURINARY:  No suprapubic or flank tenderness. MUSCULOSKELETAL:  No asymmetry of the extremities. CNS:  The patient awake, alert, oriented to time, date, place, person.     LABORATORY DATA:  Lab findings on discharge are as follows:  Metabolic panel with a sodium of 140, potassium 4.0, chloride of 110, bicarb of 23, BUN of 10, creatinine 1.02, glucose of 122, calcium 8.8. Ammonia level decreased to 44. CBC with a WBC of 7.7, hemoglobin 11.9, hematocrit 39.7 with a platelet count of 417, 64% neutrophils, 1% bands. Preliminary urine culture with greater than 100,000 possible Aerococcus species. FINAL DIAGNOSES ON DISCHARGE:  1. Resolved acute metabolic encephalopathy due to severe symptomatic urinary tract infection and dehydration, present on admission. 2.  Resolving severe symptomatic probable Aerococcus urinary tract infection without any sepsis. Discharged on a 10-day course of amoxicillin 500 mg p.o. three times a day. 3.  Resolved generalized abdominal pain with CT findings positive for cirrhosis. 4.  Probable transaminitis with Liver Cirrhosis seen on CT ABD/Pelvis. 5.  6 mm right middle lobe lung nodule seen on CT of the abdomen and pelvis. RECOMMENDATIONS:  Outpatient followup imaging to monitor status of the nodule. DISCHARGE MEDICATIONS:  As follows:  1. The patient is to take amoxicillin 500 mg p.o. three times a day for 10 days. 2.  Lactobacillus 1 capsule p.o. twice a day for 10 days. 3.  To continue ondansetron 4 mg 1 tablet p.o. every 8 hours as needed for nausea and vomiting. DISCHARGE INSTRUCTIONS:  The patient instructed to follow a regular diet as tolerated, to ambulate as tolerated, to keep adequately hydrated at all times. The patient is to follow up with PCP as scheduled on 03/07/2019 at 02:00 p.m. Recommendations for the patient to be referred to a urologist for further evaluation of recurrent urinary tract infections. Recommendation for the patient to have a followup CT of the chest in 3 to 6 months for monitoring of the 6 mm right middle lobe lung nodule. Recommendation for the patient to have a followup liver function test and abdominal ultrasound for monitoring of possible cirrhosis with probable nonalcoholic steatohepatitis.     PENDING TESTS FOR FOLLOWUP ON DISCHARGE:  Include the finalized urine culture results. The patient is stable for discharge to home this date 03/07/2019. The entire discharge plans including all medications, their side effects, the importance of adhering to all outpatient followup plans discussed in detail with the patient and the patient's sister Katiana Clark, who can be reached at cell [phone]. All questions and concerns were addressed. Total time for the discharge summary 55 minutes.     Lu Zayas MD      [id number]  D:  03/07/2019 16:48  T:  03/07/2019 18:06  JOB #:  [id number]

## 2019-03-07 NOTE — PROGRESS NOTES
Problem: Mobility Impaired (Adult and Pediatric)  Goal: *Acute Goals and Plan of Care (Insert Text)  Physical Therapy Goals  Initiated 3/7/2019  1. Patient will move from supine to sit and sit to supine , scoot up and down and roll side to side in bed with modified independence within 7 day(s). 2.  Patient will transfer from bed to chair and chair to bed with modified independence using the least restrictive device within 7 day(s). 3.  Patient will perform sit to stand with modified independence within 7 day(s). 4.  Patient will ambulate with modified independence for 50 feet with the least restrictive device within 7 day(s). physical Therapy EVALUATION  Patient: Edwin Gregg [de-identified]75 y.o. male)  Date: 3/7/2019  Primary Diagnosis: Altered mental status [R41.82]  Altered mental status [R41.82]       Precautions: fall;confusion       ASSESSMENT :   Based on the objective data described below, the patient presented with Contact guard assistance level overall for transfers. Gait training completed over 5 feet using a gait belt and rolling walker and at the Contact guard assistance level of assistance. Patient is adamant about being discharged to attend an appointment to Latanya . It was difficult to get good assessment of him mobility as he was complaining of left LE pain and overall frustration with therapy intervening as he was trying to get to bathroom with PCT. Unclear of his mobility at home as he states he has an electric scooter but also walks with cane. He states he has fallen at home.   The following are barriers to independence while in acute care:   -Cognitive and/or behavioral: orientation, processing, safety awareness, insight into deficits and insight into abilities  -Medical condition: strength and standing balance    -Other:       The patient will benefit from skilled acute intervention to address the above impairments and their rehabilitation potential is considered to be Christophe Hickman    Discharge recommendations:Home health; 24 supervision; Patient's barriers to discharging home, in addition to above impairments: lives alone family availability to assist history of falls total assist driving to follow up medical appointment(s)/groceries/obtain medication family availability for education/training to then follow up at home level of physical assist required to maintain patient safety. Equipment recommendations for successful discharge (if) home: TBD     PLAN :  Recommendations and Planned Interventions: bed mobility training, transfer training, gait training, exercises and sit<>stand      Frequency/Duration: Patient will be followed by physical therapy  5 times a week to address goals. SUBJECTIVE:   Patient stated I have got to get to an appointment at 1 to my primary care doctor. Sultana Worley    OBJECTIVE DATA SUMMARY:   HISTORY:    Past Medical History:   Diagnosis Date    Diabetes ([facility]     Gastrointestinal disorder     IBS    Hypertension     Psychiatric disorder     depression, anxiety    Sleep disorder     DENG, CPAP @ night    Stroke ([facility] 1993    L sided deficit     Past Surgical History:   Procedure Laterality Date    HX KNEE REPLACEMENT      Pt stated     Prior Level of Function/Home Situation: per patient, modified independent using cane, walker or scooter; has had falls; states care aide comes 2x/week.   Personal factors and/or comorbidities impacting plan of care:     Home Situation  Home Environment: Apartment  # Steps to Enter: 2  One/Two Story Residence: One story  Living Alone: Yes  Support Systems: Home care staff, Family member(s)  Patient Expects to be Discharged to[de-identified] Private residence  Current DME Used/Available at Home: [employer], straight, Walker, rolling, Other (comment)(electric scooter)    EXAMINATION/PRESENTATION/DECISION MAKING:   Critical Behavior:     Orientation Level: Oriented X4  Cognition: Follows commands, Other (comment)(delayed responses in communication)     Hearing: Auditory  Auditory Impairment: None  Range Of Motion:  AROM: Generally decreased, functional           PROM: Generally decreased, functional           Strength:    Strength: Generally decreased, functional                Coordination:  Coordination: Generally decreased, functional  Functional Mobility:  Bed Mobility:     Supine to Sit: Modified independent  Sit to Supine: Minimum assistance     Transfers:  Sit to Stand: Contact guard assistance  Stand to Sit: Contact guard assistance                       Balance:   Sitting: Intact  Standing: Impaired; With support  Standing - Static: Good  Standing - Dynamic : Fair  Ambulation/Gait Training:  Distance (ft): 5 Feet (ft)  Assistive Device: Gait belt;Walker, rolling  Ambulation - Level of Assistance: Minimal assistance     Gait Description (WDL): Exceptions to WDL  Gait Abnormalities: Decreased step clearance;Shuffling gait        Base of Support: Widened;Shift to right     Speed/Nicolle: Pace decreased (<100 feet/min)  Step Length: Right shortened;Left shortened  Swing Pattern: Left asymmetrical             Functional Measure:  Timed up and go:    Timed Get Up And Go Test: (unable without assist)       < than 10 seconds=Normal  Greater then 13.5 seconds (in elderly)=Increased fall risk   Rob Wade [place] M. Predicting the probability for falls in community dwelling older adults using the Timed Up and Go Test. Phys Ther. 2000;80:896-903.                 Physical Therapy Evaluation Charge Determination   History Examination Presentation Decision-Making   MEDIUM  Complexity : 1-2 comorbidities / personal factors will impact the outcome/ POC  LOW Complexity : 1-2 Standardized tests and measures addressing body structure, function, activity limitation and / or participation in recreation  LOW Complexity : Stable, uncomplicated  Other outcome measures TUG  LOW       Based on the above components, the patient evaluation is determined to be of the following complexity level: LOW     After treatment patient left:   Supine in bed  Bed/Chair-wheels locked  Bed in low position  Call light within reach  RN notified     COMMUNICATION/EDUCATION:   The patients plan of care was discussed with: Registered Nurse. Patient understands intent and goals of therapy, but is neutral about his/her participation.     Thank you for this referral.  Nia Varghese, PT   Time Calculation: 23 mins

## 2019-03-07 NOTE — PHYSICIAN ADVISORY
Letter of Status Determination:   Recommend hospitalization status upgraded from   OBSERVATION  to INPATIENT  Status     Pt Name:  Gilles Jones   MR#   [address] [facility] # [id number] /  [id number]  Payor: Renny Frankel / Plan: [address] / Product Type: Medicare /    DANIEL#  [id number]   Room and Hospital  604/01  @ [facility]   Hospitalization date  3/5/2019 10:14 PM   Current Attending Physician  Caity Clemons MD   Principal diagnosis  Altered mental status [R41.82]     Clinicals  75 y.o. y.o  male hospitalized with above diagnosis   The pt is receiving IV abx and other supportive care for presenting with Acute encephalopathy due to UTI. Pt was also found to have elevated LFT and Ammonia levels at the time of hospitalization. Due to appropriate and necessary medical care, this pt's hospitalization has now exceeded two midnights . [place] ([facility]) criteria   Does  NOT apply    STATUS DETERMINATION  This patient is at above high risk of deterioration based on documented presenting clinical data, comorbid conditions, high risk of adverse events and current acute care course. Mr. Gilles Jones now meets Inpatient Admission status criteria in accordance with CMS regulation Section 43 .3. Specifically, due to medical necessity the patient's stay now exceeds Two Midnights. It is our recommendation that this patient's hospitalization status should be upgraded from  OBSERVATION to INPATIENT status.      The final decision of the patient's hospitalization status depends on the attending physician's judgment            Additional comments     Payor: Renny Frankel / Plan: [address] / Product Type: Medicare /         Van Ramirez MD MPH FAC   Cell: [phone]  Physician [facility]   President Medical Staff, [address] [place] [address]    Cell  [phone]        [phone]    .

## 2019-03-07 NOTE — PROGRESS NOTES
Problem: Pressure Injury - Risk of  Goal: *Prevention of pressure injury  Document Murphy Scale and appropriate interventions in the flowsheet.   Outcome: Progressing Towards Goal  Pressure Injury Interventions:  Sensory Interventions: Assess changes in LOC, Assess need for specialty bed, Minimize linen layers, Keep linens dry and wrinkle-free    Moisture Interventions: Absorbent underpads, Minimize layers    Activity Interventions: Increase time out of bed, Pressure redistribution bed/mattress(bed type)    Mobility Interventions: HOB 30 degrees or less, Pressure redistribution bed/mattress (bed type)    Nutrition Interventions: Document food/fluid/supplement intake, Offer support with meals,snacks and hydration    Friction and Shear Interventions: Minimize layers, HOB 30 degrees or less, Lift sheet

## 2019-03-08 LAB
BACTERIA SPEC CULT: ABNORMAL
CC UR VC: ABNORMAL
SERVICE CMNT-IMP: ABNORMAL

## 2019-03-08 NOTE — PROGRESS NOTES
Late Entry: went down to room yesterday as patient had been discharged. Ssister was there and was in a panic to get patient ready to leave as patient had a medical appointment. Son was on way with patient's wheelchair. Family/patient declined any services and wanted to leave as soon as possible.     [phone]

## 2019-03-11 LAB
BACTERIA SPEC CULT: ABNORMAL
BACTERIA SPEC CULT: ABNORMAL
SERVICE CMNT-IMP: ABNORMAL

## 2020-04-19 ENCOUNTER — HOSPITAL ENCOUNTER (EMERGENCY)
Age: 75
Discharge: HOME | End: 2020-04-20
Attending: EMERGENCY MEDICINE | Admitting: EMERGENCY MEDICINE
Payer: MEDICARE

## 2020-04-19 VITALS
SYSTOLIC BLOOD PRESSURE: 125 MMHG | OXYGEN SATURATION: 95 % | RESPIRATION RATE: 16 BRPM | TEMPERATURE: 98 F | DIASTOLIC BLOOD PRESSURE: 50 MMHG | HEART RATE: 95 BPM

## 2020-04-19 DIAGNOSIS — S92.355A NONDISPLACED FRACTURE OF FIFTH METATARSAL BONE, LEFT FOOT, INITIAL ENCOUNTER FOR CLOSED FRACTURE: Primary | ICD-10-CM

## 2020-04-19 PROCEDURE — 99284 EMERGENCY DEPT VISIT MOD MDM: CPT

## 2020-04-20 ENCOUNTER — HOSPITAL ENCOUNTER (EMERGENCY)
Dept: CT IMAGING | Age: 75
Discharge: HOME | End: 2020-04-20
Attending: EMERGENCY MEDICINE
Payer: MEDICARE

## 2020-04-20 ENCOUNTER — APPOINTMENT (OUTPATIENT)
Dept: GENERAL RADIOLOGY | Age: 75
End: 2020-04-20
Attending: EMERGENCY MEDICINE
Payer: MEDICARE

## 2020-04-20 PROCEDURE — 71045 X-RAY EXAM CHEST 1 VIEW: CPT

## 2020-04-20 PROCEDURE — 73610 X-RAY EXAM OF ANKLE: CPT

## 2020-04-20 PROCEDURE — 74011250637 HC RX REV CODE- 250/637: Performed by: EMERGENCY MEDICINE

## 2020-04-20 PROCEDURE — 70450 CT HEAD/BRAIN W/O DYE: CPT

## 2020-04-20 PROCEDURE — 73030 X-RAY EXAM OF SHOULDER: CPT

## 2020-04-20 PROCEDURE — 72125 CT NECK SPINE W/O DYE: CPT

## 2020-04-20 PROCEDURE — 73630 X-RAY EXAM OF FOOT: CPT

## 2020-04-20 PROCEDURE — 74176 CT ABD & PELVIS W/O CONTRAST: CPT

## 2020-04-20 RX ORDER — TRAMADOL HYDROCHLORIDE 50 MG/1
50 TABLET ORAL
Status: COMPLETED | OUTPATIENT
Start: 2020-04-20 | End: 2020-04-20

## 2020-04-20 RX ORDER — TRAMADOL HYDROCHLORIDE 50 MG/1
50 TABLET ORAL
Qty: 9 TAB | Refills: 0 | Status: SHIPPED | OUTPATIENT
Start: 2020-04-20 | End: 2020-04-23

## 2020-04-20 RX ADMIN — TRAMADOL HYDROCHLORIDE 50 MG: 50 TABLET, FILM COATED ORAL at 00:30

## 2020-04-20 NOTE — ED PROVIDER NOTES
The history is provided by the patient and the EMS personnel. History limited by: patient is a poor historian. Fall   The accident occurred less than 1 hour ago. He fell from a height of ground level. He landed on hard floor. There was no blood loss. The point of impact was the head. Pain location: head, neck, upper lumbar spine. The pain is moderate. Associated symptoms include abdominal pain (not new since the fall). Pertinent negatives include no fever, no numbness, no nausea, no vomiting, no extremity weakness and no loss of consciousness. The symptoms are aggravated by pressure on injury. Past Medical History:   Diagnosis Date    Diabetes ([facility]     Gastrointestinal disorder     IBS    Hypertension     Psychiatric disorder     depression, anxiety    Sleep disorder     DENG, CPAP @ night    Stroke ([facility] 1993    L sided deficit       Past Surgical History:   Procedure Laterality Date    HX KNEE REPLACEMENT      Pt stated         No family history on file.     Social History     Socioeconomic History    Marital status: SINGLE     Spouse name: Not on file    Number of children: Not on file    Years of education: Not on file    Highest education level: Not on file   Occupational History    Not on file   Social Needs    Financial resource strain: Not on file    Food insecurity     Worry: Not on file     Inability: Not on file    Transportation needs     Medical: Not on file     Non-medical: Not on file   Tobacco Use    Smoking status: Never Smoker    Smokeless tobacco: Never Used   Substance and Sexual Activity    Alcohol use: No     Frequency: Never    Drug use: No    Sexual activity: Never   Lifestyle    Physical activity     Days per week: Not on file     Minutes per session: Not on file    Stress: Not on file   Relationships    Social connections     Talks on phone: Not on file     Gets together: Not on file     Attends Orthodox service: Not on file     Active member of club or organization: Not on file     Attends meetings of clubs or organizations: Not on file     Relationship status: Not on file    Intimate partner violence     Fear of current or ex partner: Not on file     Emotionally abused: Not on file     Physically abused: Not on file     Forced sexual activity: Not on file   Other Topics Concern    Not on file   Social History Narrative    Not on file         ALLERGIES: Iodine    Review of Systems   Constitutional: Negative for chills and fever. Respiratory: Negative for shortness of breath. Cardiovascular: Negative for chest pain. Gastrointestinal: Positive for abdominal pain (not new since the fall). Negative for constipation, diarrhea, nausea and vomiting. Musculoskeletal: Positive for back pain and neck pain. Negative for extremity weakness. Neurological: Negative for dizziness, loss of consciousness, light-headedness and numbness. All other systems reviewed and are negative. Vitals:    04/19/20 2354   BP: 125/50   Pulse: 95   Resp: 16   Temp: 98 °F (36.7 °C)   SpO2: 95%            Physical Exam  Vitals signs and nursing note reviewed. Constitutional:       Appearance: He is well-developed. HENT:      Head: Normocephalic and atraumatic. Eyes:      General: No scleral icterus. Neck:      Musculoskeletal: Normal range of motion. Cardiovascular:      Rate and Rhythm: Normal rate. Pulmonary:      Effort: Pulmonary effort is normal.   Chest:      Chest wall: No tenderness. Abdominal:      General: There is no distension. Tenderness: There is abdominal tenderness (LUQ). Musculoskeletal:      Right elbow: Normal.     Left elbow: Normal.      Left ankle: He exhibits swelling. Cervical back: He exhibits tenderness. He exhibits no deformity. Thoracic back: He exhibits no tenderness and no deformity. Lumbar back: He exhibits tenderness. He exhibits no deformity. Skin:     Findings: No erythema or rash.    Neurological:      Mental Status: He is alert and oriented to person, place, and time. Cranial Nerves: No cranial nerve deficit. Psychiatric:         Mood and Affect: Mood normal.         Behavior: Behavior normal.          MDM  Number of Diagnoses or Management Options  Nondisplaced fracture of fifth metatarsal bone, left foot, initial encounter for closed fracture:   Diagnosis management comments: The patient presented after a fall. The patient is now resting comfortably and feels better, is alert and in no distress. The patient has a normal mental status and is neurologically intact. The history, exam, diagnostic testing (if any), and current condition do not demonstrate signs of clinically significant intra-cranial, intra-thoracic, intra-abdominal, or severe musculoskeletal trauma. Xray shows a metatarsal fracture and patient was given a post-op shoe to help manage his injuries. The vital signs have been stable. The patient's condition is stable and appropriate for discharge. The patient will pursue further outpatient evaluation with the primary care physician or other designated or consulting physician as indicated in the discharge instructions.            Procedures

## 2020-04-20 NOTE — ED TRIAGE NOTES
Arrived from home reporting unwitnessed GLF that happened earlier today. Reports head trauma. Patient was using his wheelchair, and somehow hit a wall and fell? Patient has unreliable story. \"Everything hurts. \"    Denies LOC.

## 2020-04-20 NOTE — ED NOTES
Patient received discharge instructions by MD and nurse. Patient verbalized understanding of medication use and other discharge instructions. Patient transported by [facility],  showing no signs of distress. Pt reports relief of most intense pain. All questions answered.

## 2020-04-28 ENCOUNTER — APPOINTMENT (OUTPATIENT)
Dept: CT IMAGING | Age: 75
End: 2020-04-28
Attending: STUDENT IN AN ORGANIZED HEALTH CARE EDUCATION/TRAINING PROGRAM
Payer: MEDICARE

## 2020-04-28 ENCOUNTER — APPOINTMENT (OUTPATIENT)
Dept: GENERAL RADIOLOGY | Age: 75
End: 2020-04-28
Attending: STUDENT IN AN ORGANIZED HEALTH CARE EDUCATION/TRAINING PROGRAM
Payer: MEDICARE

## 2020-04-28 ENCOUNTER — HOSPITAL ENCOUNTER (EMERGENCY)
Age: 75
Discharge: HOME | End: 2020-04-28
Attending: STUDENT IN AN ORGANIZED HEALTH CARE EDUCATION/TRAINING PROGRAM | Admitting: STUDENT IN AN ORGANIZED HEALTH CARE EDUCATION/TRAINING PROGRAM
Payer: MEDICARE

## 2020-04-28 VITALS
HEART RATE: 73 BPM | RESPIRATION RATE: 16 BRPM | DIASTOLIC BLOOD PRESSURE: 83 MMHG | TEMPERATURE: 97.6 F | OXYGEN SATURATION: 95 % | SYSTOLIC BLOOD PRESSURE: 151 MMHG

## 2020-04-28 DIAGNOSIS — R03.0 ELEVATED BLOOD PRESSURE READING: ICD-10-CM

## 2020-04-28 DIAGNOSIS — R10.13 ABDOMINAL PAIN, EPIGASTRIC: ICD-10-CM

## 2020-04-28 DIAGNOSIS — K74.60 CIRRHOSIS OF LIVER WITH ASCITES, UNSPECIFIED HEPATIC CIRRHOSIS TYPE (HCC): ICD-10-CM

## 2020-04-28 DIAGNOSIS — R18.8 CIRRHOSIS OF LIVER WITH ASCITES, UNSPECIFIED HEPATIC CIRRHOSIS TYPE (HCC): ICD-10-CM

## 2020-04-28 DIAGNOSIS — R11.2 NON-INTRACTABLE VOMITING WITH NAUSEA, UNSPECIFIED VOMITING TYPE: Primary | ICD-10-CM

## 2020-04-28 LAB
ALBUMIN SERPL-MCNC: 2.7 G/DL (ref 3.5–5)
ALBUMIN/GLOB SERPL: 0.6 {RATIO} (ref 1.1–2.2)
ALP SERPL-CCNC: 283 U/L (ref 45–117)
ALT SERPL-CCNC: 83 U/L (ref 12–78)
ANION GAP SERPL CALC-SCNC: 3 MMOL/L (ref 5–15)
APPEARANCE UR: CLEAR
AST SERPL-CCNC: 103 U/L (ref 15–37)
ATRIAL RATE: 83 BPM
BACTERIA URNS QL MICRO: NEGATIVE /HPF
BASOPHILS # BLD: 0.1 K/UL (ref 0–0.1)
BASOPHILS NFR BLD: 1 % (ref 0–1)
BILIRUB SERPL-MCNC: 0.3 MG/DL (ref 0.2–1)
BILIRUB UR QL: NEGATIVE
BUN SERPL-MCNC: 11 MG/DL (ref 6–20)
BUN/CREAT SERPL: 9 (ref 12–20)
CALCIUM SERPL-MCNC: 8.8 MG/DL (ref 8.5–10.1)
CALCULATED P AXIS, ECG09: 43 DEGREES
CALCULATED R AXIS, ECG10: -64 DEGREES
CALCULATED T AXIS, ECG11: 21 DEGREES
CHLORIDE SERPL-SCNC: 111 MMOL/L (ref 97–108)
CO2 SERPL-SCNC: 24 MMOL/L (ref 21–32)
COLOR UR: ABNORMAL
CREAT SERPL-MCNC: 1.16 MG/DL (ref 0.7–1.3)
DIAGNOSIS, 93000: NORMAL
DIFFERENTIAL METHOD BLD: ABNORMAL
EOSINOPHIL # BLD: 0.2 K/UL (ref 0–0.4)
EOSINOPHIL NFR BLD: 4 % (ref 0–7)
EPITH CASTS URNS QL MICRO: ABNORMAL /LPF
ERYTHROCYTE [DISTWIDTH] IN BLOOD BY AUTOMATED COUNT: 24.1 % (ref 11.5–14.5)
GLOBULIN SER CALC-MCNC: 4.6 G/DL (ref 2–4)
GLUCOSE SERPL-MCNC: 273 MG/DL (ref 65–100)
GLUCOSE UR STRIP.AUTO-MCNC: >1000 MG/DL
HCT VFR BLD AUTO: 40.4 % (ref 36.6–50.3)
HGB BLD-MCNC: 12.1 G/DL (ref 12.1–17)
HGB UR QL STRIP: NEGATIVE
HYALINE CASTS URNS QL MICRO: ABNORMAL /LPF (ref 0–5)
IMM GRANULOCYTES # BLD AUTO: 0 K/UL (ref 0–0.04)
IMM GRANULOCYTES NFR BLD AUTO: 0 % (ref 0–0.5)
KETONES UR QL STRIP.AUTO: NEGATIVE MG/DL
LEUKOCYTE ESTERASE UR QL STRIP.AUTO: NEGATIVE
LIPASE SERPL-CCNC: 128 U/L (ref 73–393)
LYMPHOCYTES # BLD: 1.5 K/UL (ref 0.8–3.5)
LYMPHOCYTES NFR BLD: 31 % (ref 12–49)
MCH RBC QN AUTO: 24.4 PG (ref 26–34)
MCHC RBC AUTO-ENTMCNC: 30 G/DL (ref 30–36.5)
MCV RBC AUTO: 81.6 FL (ref 80–99)
MONOCYTES # BLD: 0.7 K/UL (ref 0–1)
MONOCYTES NFR BLD: 14 % (ref 5–13)
NEUTS SEG # BLD: 2.4 K/UL (ref 1.8–8)
NEUTS SEG NFR BLD: 50 % (ref 32–75)
NITRITE UR QL STRIP.AUTO: NEGATIVE
NRBC # BLD: 0 K/UL (ref 0–0.01)
NRBC BLD-RTO: 0 PER 100 WBC
P-R INTERVAL, ECG05: 152 MS
PH UR STRIP: 7 [PH] (ref 5–8)
PLATELET # BLD AUTO: 142 K/UL (ref 150–400)
POTASSIUM SERPL-SCNC: 3.6 MMOL/L (ref 3.5–5.1)
POTASSIUM SERPL-SCNC: 4.8 MMOL/L (ref 3.5–5.1)
PROT SERPL-MCNC: 7.3 G/DL (ref 6.4–8.2)
PROT UR STRIP-MCNC: NEGATIVE MG/DL
Q-T INTERVAL, ECG07: 388 MS
QRS DURATION, ECG06: 96 MS
QTC CALCULATION (BEZET), ECG08: 455 MS
RBC # BLD AUTO: 4.95 M/UL (ref 4.1–5.7)
RBC #/AREA URNS HPF: ABNORMAL /HPF (ref 0–5)
RBC MORPH BLD: ABNORMAL
RBC MORPH BLD: ABNORMAL
SODIUM SERPL-SCNC: 138 MMOL/L (ref 136–145)
SP GR UR REFRACTOMETRY: 1.02 (ref 1–1.03)
TROPONIN I SERPL-MCNC: <0.05 NG/ML
UR CULT HOLD, URHOLD: NORMAL
UROBILINOGEN UR QL STRIP.AUTO: 1 EU/DL (ref 0.2–1)
VENTRICULAR RATE, ECG03: 83 BPM
WBC # BLD AUTO: 4.9 K/UL (ref 4.1–11.1)
WBC URNS QL MICRO: ABNORMAL /HPF (ref 0–4)

## 2020-04-28 PROCEDURE — 74011250637 HC RX REV CODE- 250/637: Performed by: STUDENT IN AN ORGANIZED HEALTH CARE EDUCATION/TRAINING PROGRAM

## 2020-04-28 PROCEDURE — 99285 EMERGENCY DEPT VISIT HI MDM: CPT

## 2020-04-28 PROCEDURE — 74176 CT ABD & PELVIS W/O CONTRAST: CPT

## 2020-04-28 PROCEDURE — 84484 ASSAY OF TROPONIN QUANT: CPT

## 2020-04-28 PROCEDURE — 71045 X-RAY EXAM CHEST 1 VIEW: CPT

## 2020-04-28 PROCEDURE — 84132 ASSAY OF SERUM POTASSIUM: CPT

## 2020-04-28 PROCEDURE — 93005 ELECTROCARDIOGRAM TRACING: CPT

## 2020-04-28 PROCEDURE — 83690 ASSAY OF LIPASE: CPT

## 2020-04-28 PROCEDURE — 80053 COMPREHEN METABOLIC PANEL: CPT

## 2020-04-28 PROCEDURE — 81001 URINALYSIS AUTO W/SCOPE: CPT

## 2020-04-28 PROCEDURE — 70450 CT HEAD/BRAIN W/O DYE: CPT

## 2020-04-28 PROCEDURE — 96361 HYDRATE IV INFUSION ADD-ON: CPT

## 2020-04-28 PROCEDURE — 96374 THER/PROPH/DIAG INJ IV PUSH: CPT

## 2020-04-28 PROCEDURE — 85025 COMPLETE CBC W/AUTO DIFF WBC: CPT

## 2020-04-28 PROCEDURE — 36415 COLL VENOUS BLD VENIPUNCTURE: CPT

## 2020-04-28 PROCEDURE — 74011250636 HC RX REV CODE- 250/636: Performed by: STUDENT IN AN ORGANIZED HEALTH CARE EDUCATION/TRAINING PROGRAM

## 2020-04-28 RX ORDER — ONDANSETRON 4 MG/1
4 TABLET, ORALLY DISINTEGRATING ORAL
Qty: 12 TAB | Refills: 0 | Status: SHIPPED | OUTPATIENT
Start: 2020-04-28 | End: 2020-05-02

## 2020-04-28 RX ORDER — FAMOTIDINE 20 MG/1
20 TABLET, FILM COATED ORAL DAILY
Qty: 10 TAB | Refills: 0 | Status: SHIPPED | OUTPATIENT
Start: 2020-04-28 | End: 2020-05-08

## 2020-04-28 RX ORDER — HYDROXYZINE 25 MG/1
25 TABLET, FILM COATED ORAL
Status: COMPLETED | OUTPATIENT
Start: 2020-04-28 | End: 2020-04-28

## 2020-04-28 RX ORDER — SODIUM CHLORIDE 9 MG/ML
1000 INJECTION, SOLUTION INTRAVENOUS ONCE
Status: COMPLETED | OUTPATIENT
Start: 2020-04-28 | End: 2020-04-28

## 2020-04-28 RX ORDER — ONDANSETRON 2 MG/ML
4 INJECTION INTRAMUSCULAR; INTRAVENOUS
Status: COMPLETED | OUTPATIENT
Start: 2020-04-28 | End: 2020-04-28

## 2020-04-28 RX ADMIN — ONDANSETRON 4 MG: 2 INJECTION, SOLUTION INTRAMUSCULAR; INTRAVENOUS at 12:56

## 2020-04-28 RX ADMIN — SODIUM CHLORIDE 1000 ML: 900 INJECTION, SOLUTION INTRAVENOUS at 12:56

## 2020-04-28 RX ADMIN — HYDROXYZINE HYDROCHLORIDE 25 MG: 25 TABLET, FILM COATED ORAL at 15:02

## 2020-04-28 NOTE — ED NOTES
Pt given discharge instructions and prescriptions along with follow up information.  Pt wheeled to waiting room in wheelchair with all belongings to await lyft ride arranged by Murphy Hodgson CM

## 2020-04-28 NOTE — ED PROVIDER NOTES
The patient is a 59-year-old male prior history of QUINN, diabetes, IBS, hypertension, prior CVA, depression and anxiety presenting to the emergency department today secondary to nausea and fatigue. Patient reports symptoms for the past 2 weeks, worsening over the past 4 to 5 days. Reports daily nausea and only one episode of vomiting. He has had diarrhea, last episode last night. No blood in the vomit or stool. He has had worsening fatigue and this morning had difficulty getting into bed due to fatigue. He has had associated dizziness, headaches as well as a slight cough. Denies chest pain or shortness of breath. No fevers. He has generalized abdominal pain, worse in the upper quadrants. No leg pain or leg swelling. No urinary complaints. He has not been taking anything for his symptoms at home. Prior records reviewed: [facility] labs from 4/6/20  Alk phos 194  AST 75  ALT 53           Past Medical History:   Diagnosis Date    Diabetes ([facility] [address]     Gastrointestinal disorder     IBS    Hypertension     Psychiatric disorder     depression, anxiety    Sleep disorder     DENG, CPAP @ night    Stroke ([facility] [address] 1993    L sided deficit       Past Surgical History:   Procedure Laterality Date    HX KNEE REPLACEMENT      Pt stated         History reviewed. No pertinent family history.     Social History     Socioeconomic History    Marital status: SINGLE     Spouse name: Not on file    Number of children: Not on file    Years of education: Not on file    Highest education level: Not on file   Occupational History    Not on file   Social Needs    Financial resource strain: Not on file    Food insecurity     Worry: Not on file     Inability: Not on file    Transportation needs     Medical: Not on file     Non-medical: Not on file   Tobacco Use    Smoking status: Never Smoker    Smokeless tobacco: Never Used   Substance and Sexual Activity    Alcohol use: No     Frequency: Never    Drug use: No    Sexual activity: Never   Lifestyle    Physical activity     Days per week: Not on file     Minutes per session: Not on file    Stress: Not on file   Relationships    Social connections     Talks on phone: Not on file     Gets together: Not on file     Attends Christianity service: Not on file     Active member of club or organization: Not on file     Attends meetings of clubs or organizations: Not on file     Relationship status: Not on file    Intimate partner violence     Fear of current or ex partner: Not on file     Emotionally abused: Not on file     Physically abused: Not on file     Forced sexual activity: Not on file   Other Topics Concern    Not on file   Social History Narrative    Not on file         ALLERGIES: Iodine    Review of Systems   Constitutional: Positive for appetite change and fatigue. Negative for chills and fever. HENT: Negative for congestion and sore throat. Eyes: Negative for pain and redness. Respiratory: Positive for cough. Negative for shortness of breath. Cardiovascular: Negative for chest pain and palpitations. Gastrointestinal: Positive for abdominal pain, diarrhea, nausea and vomiting. Genitourinary: Negative for frequency and hematuria. Musculoskeletal: Negative for back pain and neck pain. Skin: Negative for rash and wound. Neurological: Positive for dizziness and headaches. Hematological: Does not bruise/bleed easily. Vitals:    04/28/20 1312 04/28/20 1502 04/28/20 1523   BP: (!) 226/91  151/83   Pulse: 86 73    Resp: 16 16    Temp: 98 °F (36.7 °C) 97.6 °F (36.4 °C)    SpO2: 98% 95%             Physical Exam  Vitals signs and nursing note reviewed. Constitutional:       General: He is not in acute distress. Appearance: He is well-developed. He is obese. HENT:      Head: Normocephalic and atraumatic. Eyes:      Conjunctiva/sclera: Conjunctivae normal.      Pupils: Pupils are equal, round, and reactive to light.    Neck:      Musculoskeletal: Normal range of motion and neck supple. Cardiovascular:      Rate and Rhythm: Normal rate and regular rhythm. Heart sounds: Murmur present. No friction rub. No gallop. Pulmonary:      Effort: Pulmonary effort is normal. No respiratory distress. Breath sounds: Normal breath sounds. No wheezing or rales. Abdominal:      General: Bowel sounds are normal. There is no distension. Palpations: Abdomen is soft. Tenderness: There is abdominal tenderness (generalized). There is no guarding or rebound. Musculoskeletal: Normal range of motion. General: No swelling or tenderness. Skin:     General: Skin is warm and dry. Capillary Refill: Capillary refill takes less than 2 seconds. Findings: No rash. Neurological:      Mental Status: He is alert and oriented to person, place, and time. Psychiatric:         Mood and Affect: Mood normal.          EKG Interpretation:   ED Physician interpretation  EKG shows normal sinus rhythm, rate of 83, left axis deviation, narrow QRS with nonspecific ST-T wave changes    Labs Reviewed:   Troponin normal  Lipase not consistent with pancreatitis  LFTs slightly above his baseline however bilirubin is okay  Normal renal function  No leukocytosis or anemia  Urinalysis without evidence of infection    Imaging Reviewed:   Chest x-ray shows no acute process  CT head shows no acute process  CT of the abdomen shows cirrhosis with portal hypertension as well as trace ascites (also seen subpleural fibrosis consistent with IPF      Course:  1L IVF given  Zofran 4mg IV given    2:32 PM re-evaluated. Pt stating he is anxious, asking for atarax. Repeat blood pressure much better without intervention    Patient tolerating peanut butter crackers      MDM:  The patient is a 41-year-old male presenting to the emergency department today with multiple complaints as above but mostly nausea. Improved with Zofran. Tolerating p.o. in the ED.   Mild discomfort with palpation on my exam however CT showed no acute process. Lab work and CT did indicate cirrhosis however patient was aware of this with a history of nonalcoholic steatohepatitis. Additionally he had some dizziness so I ordered a CT of the head and an EKG which showed no acute process and no acute ischemic changes. His vital signs are stable and his lab work is otherwise reassuring. He is tolerating p.o. fine and was given IV fluids here. He was given a prescription for Zofran, Pepcid and a referral to GI. Patient symptoms could be related to gastritis versus a viral gastroenteritis picture. Treated symptomatically as above and will be discharged home with strict return precautions provided. Clinical Impression:     ICD-10-CM ICD-9-CM    1. Non-intractable vomiting with nausea, unspecified vomiting type R11.2 787.01    2. Cirrhosis of liver with ascites, unspecified hepatic cirrhosis type (HCC) K74.60 571.5     R18.8     3. Elevated blood pressure reading R03.0 796.2    4.  Abdominal pain, epigastric R10.13 789.06          Disposition: ROSALINA Mckeon,

## 2020-04-28 NOTE — DISCHARGE INSTRUCTIONS
PLEASE RETURN IF ABDOMINAL PAIN WORSENS, LOCALIZES TO THE RIGHT LOWER ABDOMEN, FEVER, OR IF YOU ARE NOT ABLE TO KEEP DOWN LIQUIDS. FOLLOW UP IN 24 HOURS EITHER IN THE EMERGENCY DEPARTMENT OR WITH YOUR PRIMARY DOCTOR IF PAIN IS STILL PRESENT.      Follow up with Gastroenterology as well as one of the primary care doctors from the list provided

## 2020-04-28 NOTE — PROGRESS NOTES
CM notified of needed transportation assistance. CM notified that pt ambulatory and discharging to address on facesheet. CM arranged [employer] via [employer] for discharge. RN notified.      Patricia Shine RN, BSN  Care Management Department

## 2020-04-28 NOTE — ED TRIAGE NOTES
Patient arrived via [facility] with complaints of abdominal pain for 2 weeks. Vomiting and dizziness as well. Patient was unable to get out of bed today. He uses wheel chair and has home health care. Patient says not much fever and a little cough. LBM last night diarrhea. No known sick contacts.

## 2020-04-29 ENCOUNTER — PATIENT OUTREACH (OUTPATIENT)
Dept: INTERNAL MEDICINE CLINIC | Age: 75
End: 2020-04-29

## 2020-04-29 NOTE — PROGRESS NOTES
TOCED [facility] [place] 4/28/2020: Abdom Pain/N/V/Cirrhosis of Liver w/ Ascites/HTN    Patient on Cov19 D/C STEVEN Enrollment Report/fu Contact. Left message on voice mail with my contact information for return call.   Need to assess for d/c needs post ED

## 2020-05-13 ENCOUNTER — HOSPITAL ENCOUNTER (EMERGENCY)
Age: 75
Discharge: HOME | End: 2020-05-13
Attending: EMERGENCY MEDICINE | Admitting: EMERGENCY MEDICINE
Payer: MEDICARE

## 2020-05-13 ENCOUNTER — APPOINTMENT (OUTPATIENT)
Dept: GENERAL RADIOLOGY | Age: 75
End: 2020-05-13
Attending: EMERGENCY MEDICINE
Payer: MEDICARE

## 2020-05-13 VITALS
TEMPERATURE: 98.6 F | OXYGEN SATURATION: 91 % | SYSTOLIC BLOOD PRESSURE: 143 MMHG | HEART RATE: 90 BPM | DIASTOLIC BLOOD PRESSURE: 85 MMHG

## 2020-05-13 DIAGNOSIS — R07.81 RIB PAIN ON LEFT SIDE: ICD-10-CM
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
## 2020-05-13 DIAGNOSIS — M25.512 ACUTE PAIN OF LEFT SHOULDER: ICD-10-CM

## 2020-05-13 PROCEDURE — 73030 X-RAY EXAM OF SHOULDER: CPT

## 2020-05-13 PROCEDURE — 71101 X-RAY EXAM UNILAT RIBS/CHEST: CPT

## 2020-05-13 PROCEDURE — 74011250637 HC RX REV CODE- 250/637: Performed by: EMERGENCY MEDICINE

## 2020-05-13 PROCEDURE — 99284 EMERGENCY DEPT VISIT MOD MDM: CPT

## 2020-05-13 RX ORDER — ACETAMINOPHEN 500 MG
1000 TABLET ORAL ONCE
Status: COMPLETED | OUTPATIENT
Start: 2020-05-13 | End: 2020-05-13

## 2020-05-13 RX ADMIN — ACETAMINOPHEN 1000 MG: 500 TABLET, FILM COATED ORAL at 17:55

## 2020-05-13 NOTE — ED PROVIDER NOTES
The history is provided by the patient. No  was used. Fall   The accident occurred yesterday. The fall occurred from a bed. He fell from a height of 1 - 2 ft. He landed on carpet. There was no blood loss. The point of impact was the left shoulder. The pain is present in the left shoulder. The pain is at a severity of 8/10. The pain is severe. He was ambulatory at the scene. There was no entrapment after the fall. There was no drug use involved in the accident. There was no alcohol use involved in the accident. Pertinent negatives include no visual change, no fever, no numbness, no abdominal pain, no bowel incontinence, no nausea, no vomiting, no hematuria, no headaches, no extremity weakness, no hearing loss, no loss of consciousness, no tingling and no laceration. The risk factors include being elderly and recurrent falls. The symptoms are aggravated by pressure on injury and use of injured limb. He has tried nothing for the symptoms. The treatment provided no relief. It is unknown when the patient last had a tetanus shot. Past Medical History:   Diagnosis Date    Diabetes ([facility] [address]     Gastrointestinal disorder     IBS    Hypertension     Psychiatric disorder     depression, anxiety    Sleep disorder     DENG, CPAP @ night    Stroke ([facility] [address] 1993    L sided deficit       Past Surgical History:   Procedure Laterality Date    HX KNEE REPLACEMENT      Pt stated         History reviewed. No pertinent family history.     Social History     Socioeconomic History    Marital status: SINGLE     Spouse name: Not on file    Number of children: Not on file    Years of education: Not on file    Highest education level: Not on file   Occupational History    Not on file   Social Needs    Financial resource strain: Not on file    Food insecurity     Worry: Not on file     Inability: Not on file    Transportation needs     Medical: Not on file     Non-medical: Not on file   Tobacco Use    Smoking status: Never Smoker    Smokeless tobacco: Never Used   Substance and Sexual Activity    Alcohol use: No     Frequency: Never    Drug use: No    Sexual activity: Never   Lifestyle    Physical activity     Days per week: Not on file     Minutes per session: Not on file    Stress: Not on file   Relationships    Social connections     Talks on phone: Not on file     Gets together: Not on file     Attends [employer] service: Not on file     Active member of club or organization: Not on file     Attends meetings of clubs or organizations: Not on file     Relationship status: Not on file    Intimate partner violence     Fear of current or ex partner: Not on file     Emotionally abused: Not on file     Physically abused: Not on file     Forced sexual activity: Not on file   Other Topics Concern    Not on file   Social History Narrative    Not on file         ALLERGIES: Iodine    Review of Systems   Constitutional: Negative for activity change, chills and fever. HENT: Negative for nosebleeds, sore throat, trouble swallowing and voice change. Eyes: Negative for visual disturbance. Respiratory: Negative for shortness of breath. Cardiovascular: Negative for chest pain and palpitations. Gastrointestinal: Negative for abdominal pain, bowel incontinence, constipation, diarrhea, nausea and vomiting. Genitourinary: Negative for difficulty urinating, dysuria, hematuria and urgency. Musculoskeletal: Positive for arthralgias and myalgias. Negative for back pain, extremity weakness, neck pain and neck stiffness. Skin: Negative for color change. Allergic/Immunologic: Negative for immunocompromised state. Neurological: Negative for dizziness, tingling, seizures, loss of consciousness, syncope, weakness, light-headedness, numbness and headaches. Psychiatric/Behavioral: Negative for behavioral problems, confusion, hallucinations, self-injury and suicidal ideas.        Vitals:    05/13/20 1631   BP: 146/77   Pulse: 90   Temp: 98.6 °F (37 °C)   SpO2: 95%            Physical Exam  Vitals signs and nursing note reviewed. Constitutional:       General: He is not in acute distress. Appearance: He is well-developed. He is not diaphoretic. HENT:      Head: Atraumatic. Neck:      Trachea: No tracheal deviation. Cardiovascular:      Comments: Warm and well perfused  Pulmonary:      Effort: Pulmonary effort is normal. No respiratory distress. Chest:      Chest wall: Tenderness present. No deformity, swelling or crepitus. Musculoskeletal:      Left shoulder: He exhibits decreased range of motion and tenderness. He exhibits no bony tenderness, no swelling, no effusion and no crepitus. Skin:     General: Skin is warm and dry. Findings: No laceration. Neurological:      Mental Status: He is alert. Coordination: Coordination normal.   Psychiatric:         Behavior: Behavior normal.         Thought Content: Thought content normal.         Judgment: Judgment normal.          MDM     This is a 60-year-old male with past medical history, review of systems, physical exam as above, presenting with complaints of left chest wall pain, left shoulder pain, secondary to ground-level fall. Patient with a history of stroke, left-sided deficit, left foot fracture, states that he fell out of bed last night. Patient states he was able to get himself back into bed, presented to the emergency department for pain in the above locations. He states he takes Advil for pain, notes he has chronic low back pain, notes it offers minimal improvement. He denies striking his head, loss of consciousness, or other injuries.   Physical exam is remarkable for chronically ill-appearing elderly male, in no acute distress, with mild mid axillary left chest wall tenderness to palpation, without ecchymosis, or crepitus, noted to have clear breath sounds, decreased range of motion of the left shoulder, without crepitus or instability, free range of motion left elbow left wrist without apparent discomfort. Low suspicion for bony injury given mechanism, and presentation. However will obtain plain films of the left shoulder and left chest wall. Disposition pending. Procedures    6:12 PM  Unremarkable plain films, will DC home with fall precautions, OTC pain control, PCP f/u, return precautions given.

## 2020-05-13 NOTE — DISCHARGE INSTRUCTIONS
Patient Education        Musculoskeletal Pain: Care Instructions  Your Care Instructions    Different problems with the bones, muscles, nerves, ligaments, and tendons in the body can cause pain. One or more areas of your body may ache or burn. Or they may feel tired, stiff, or sore. The medical term for this type of pain is musculoskeletal pain. It can have many different causes. Sometimes the pain is caused by an injury such as a strain or sprain. Or you might have pain from using one part of your body in the same way over and over again. This is called overuse. In some cases, the cause of the pain is another health problem such as arthritis or fibromyalgia. The doctor will examine you and ask you questions about your health to help find the cause of your pain. Blood tests or imaging tests like an X-ray may also be helpful. But sometimes doctors can't find a cause of the pain. Treatment depends on your symptoms and the cause of the pain, if known. The doctor has checked you carefully, but problems can develop later. If you notice any problems or new symptoms, get medical treatment right away. Follow-up care is a key part of your treatment and safety. Be sure to make and go to all appointments, and call your doctor if you are having problems. It's also a good idea to know your test results and keep a list of the medicines you take. How can you care for yourself at home? · Rest until you feel better. · Do not do anything that makes the pain worse. Return to exercise gradually if you feel better and your doctor says it's okay. · Be safe with medicines. Read and follow all instructions on the label. ? If the doctor gave you a prescription medicine for pain, take it as prescribed. ? If you are not taking a prescription pain medicine, ask your doctor if you can take an over-the-counter medicine. · Put ice or a cold pack on the area for 10 to 20 minutes at a time to ease pain.  Put a thin cloth between the ice and your skin. When should you call for help? Call your doctor now or seek immediate medical care if:    · You have new pain, or your pain gets worse.     · You have new symptoms such as a fever, a rash, or chills.    Watch closely for changes in your health, and be sure to contact your doctor if:    · You do not get better as expected. Where can you learn more? Go to http://aaron-jessica.info/  Enter Q624 in the search box to learn more about \"Musculoskeletal Pain: Care Instructions. \"  Current as of: November 19, 2019Content Version: 12.4  © 9062-0641 [employer]. Care instructions adapted under license by [employer] (which disclaims liability or warranty for this information). If you have questions about a medical condition or this instruction, always ask your healthcare professional. [address] any warranty or liability for your use of this information. Patient Education        Learning About RICE (Rest, Ice, Compression, and Elevation)  What is RICE? RICE is a way to care for an injury. RICE helps relieve pain and swelling. It may also help with healing and flexibility. RICE stands for:  · R est and protect the injured or sore area. · I ce or a cold pack used as soon as possible. · C ompression, or wrapping the injured or sore area with an elastic bandage. · E levation (propping up) the injured or sore area. How do you do RICE? You can use RICE for home treatment when you have general aches and pains or after an injury or surgery. Rest  · Do not put weight on the injury for at least 24 to 48 hours. · Use crutches for a badly sprained knee or ankle. · Support a sprained wrist, elbow, or shoulder with a sling. Ice  · Put ice or a cold pack on the injury right away to reduce pain and swelling. Frozen vegetables will also work as an ice pack. Put a thin cloth between the ice or cold pack and your skin.  The cloth protects the injured area from getting too cold. · Use ice for 10 to 15 minutes at a time for the first 48 to 72 hours. Compression  · Use compression for sprains, strains, and surgeries of the arms and legs. · Wrap the injured area with an elastic bandage or compression sleeve to reduce swelling. · Don't wrap it too tightly. If the area below it feels numb, tingles, or feels cool, loosen the wrap. Elevation  · Use elevation for areas of the body that can be propped up, such as arms and legs. · Prop up the injured area on pillows whenever you use ice. Keep it propped up anytime you sit or lie down. · Try to keep the injured area at or above the level of your heart. This will help reduce swelling and bruising. Where can you learn more? Go to http://aaron-jessica.info/  Enter I463 in the search box to learn more about \"Learning About RICE (Rest, Ice, Compression, and Elevation). \"  Current as of: June 26, 2019Content Version: 12.4  © 5026-3988 [employer]. Care instructions adapted under license by [employer] (which disclaims liability or warranty for this information). If you have questions about a medical condition or this instruction, always ask your healthcare professional. [address] any warranty or liability for your use of this information.

## 2020-05-13 NOTE — ED TRIAGE NOTES
TRIAGE NOTE: Patient arrives by EMS from home [facility] last night after falling off bed reports left sided rib pain and left shoulder pain.

## 2020-05-13 NOTE — PROGRESS NOTES
CM notified of needed transportation assistance. CM notified pt non-ambulatory and address is verified. Pt has an elevator to get to home. CM placed referral with [facility]; awaiting response. CM to provide RN with PCS form, facesheet, and ED visit summary.      Delma Seek RN, BSN  Care Management Department

## 2020-05-24 ENCOUNTER — HOSPITAL ENCOUNTER (INPATIENT)
Age: 75
LOS: 5 days | Discharge: NURSING, REHAB OR LONG-TERM CARE | DRG: 313 | End: 2020-05-30
Attending: EMERGENCY MEDICINE | Admitting: HOSPITALIST
Payer: MEDICARE

## 2020-05-24 ENCOUNTER — APPOINTMENT (OUTPATIENT)
Dept: GENERAL RADIOLOGY | Age: 75
DRG: 313 | End: 2020-05-24
Attending: EMERGENCY MEDICINE
Payer: MEDICARE

## 2020-05-24 ENCOUNTER — APPOINTMENT (OUTPATIENT)
Dept: GENERAL RADIOLOGY | Age: 75
DRG: 313 | End: 2020-05-24
Attending: HOSPITALIST
Payer: MEDICARE

## 2020-05-24 DIAGNOSIS — R25.1 TREMOR: ICD-10-CM

## 2020-05-24 DIAGNOSIS — R06.02 SOB (SHORTNESS OF BREATH): ICD-10-CM

## 2020-05-24 DIAGNOSIS — R07.9 ACUTE CHEST PAIN: Primary | ICD-10-CM

## 2020-05-24 DIAGNOSIS — Z86.73 HISTORY OF STROKE: ICD-10-CM

## 2020-05-24 DIAGNOSIS — R29.898 LEFT ARM WEAKNESS: ICD-10-CM

## 2020-05-24 LAB
ALBUMIN SERPL-MCNC: 2.6 G/DL (ref 3.5–5)
ALBUMIN/GLOB SERPL: 0.6 {RATIO} (ref 1.1–2.2)
ALP SERPL-CCNC: 220 U/L (ref 45–117)
ALT SERPL-CCNC: 48 U/L (ref 12–78)
AMPHET UR QL SCN: NEGATIVE
ANION GAP SERPL CALC-SCNC: 8 MMOL/L (ref 5–15)
APPEARANCE UR: ABNORMAL
ARTERIAL PATENCY WRIST A: YES
AST SERPL-CCNC: 57 U/L (ref 15–37)
BACTERIA URNS QL MICRO: ABNORMAL /HPF
BARBITURATES UR QL SCN: NEGATIVE
BASE DEFICIT BLD-SCNC: 2 MMOL/L
BASOPHILS # BLD: 0.1 K/UL (ref 0–0.1)
BASOPHILS NFR BLD: 1 % (ref 0–1)
BDY SITE: ABNORMAL
BENZODIAZ UR QL: NEGATIVE
BILIRUB SERPL-MCNC: 0.2 MG/DL (ref 0.2–1)
BILIRUB UR QL: NEGATIVE
BNP SERPL-MCNC: 24 PG/ML
BUN SERPL-MCNC: 16 MG/DL (ref 6–20)
BUN/CREAT SERPL: 13 (ref 12–20)
CA-I BLD-SCNC: 1.29 MMOL/L (ref 1.12–1.32)
CALCIUM SERPL-MCNC: 8.9 MG/DL (ref 8.5–10.1)
CANNABINOIDS UR QL SCN: NEGATIVE
CHLORIDE SERPL-SCNC: 109 MMOL/L (ref 97–108)
CHOLEST SERPL-MCNC: 173 MG/DL
CK SERPL-CCNC: 78 U/L (ref 39–308)
CO2 SERPL-SCNC: 20 MMOL/L (ref 21–32)
COCAINE UR QL SCN: NEGATIVE
COLOR UR: ABNORMAL
COMMENT, HOLDF: NORMAL
CREAT SERPL-MCNC: 1.23 MG/DL (ref 0.7–1.3)
D DIMER PPP FEU-MCNC: 0.7 MG/L FEU (ref 0–0.65)
DIFFERENTIAL METHOD BLD: ABNORMAL
DRUG SCRN COMMENT,DRGCM: NORMAL
EOSINOPHIL # BLD: 0.3 K/UL (ref 0–0.4)
EOSINOPHIL NFR BLD: 4 % (ref 0–7)
EPITH CASTS URNS QL MICRO: ABNORMAL /LPF
ERYTHROCYTE [DISTWIDTH] IN BLOOD BY AUTOMATED COUNT: 22 % (ref 11.5–14.5)
ETHANOL SERPL-MCNC: <10 MG/DL
GAS FLOW.O2 O2 DELIVERY SYS: ABNORMAL L/MIN
GLOBULIN SER CALC-MCNC: 4.3 G/DL (ref 2–4)
GLUCOSE SERPL-MCNC: 189 MG/DL (ref 65–100)
GLUCOSE UR STRIP.AUTO-MCNC: NEGATIVE MG/DL
HCO3 BLD-SCNC: 23.4 MMOL/L (ref 22–26)
HCT VFR BLD AUTO: 37.7 % (ref 36.6–50.3)
HDLC SERPL-MCNC: 43 MG/DL
HDLC SERPL: 4 {RATIO} (ref 0–5)
HGB BLD-MCNC: 11.3 G/DL (ref 12.1–17)
HGB UR QL STRIP: NEGATIVE
IMM GRANULOCYTES # BLD AUTO: 0 K/UL (ref 0–0.04)
IMM GRANULOCYTES NFR BLD AUTO: 0 % (ref 0–0.5)
KETONES UR QL STRIP.AUTO: NEGATIVE MG/DL
LDLC SERPL CALC-MCNC: 99.4 MG/DL (ref 0–100)
LEUKOCYTE ESTERASE UR QL STRIP.AUTO: ABNORMAL
LIPID PROFILE,FLP: ABNORMAL
LYMPHOCYTES # BLD: 2.1 K/UL (ref 0.8–3.5)
LYMPHOCYTES NFR BLD: 33 % (ref 12–49)
MAGNESIUM SERPL-MCNC: 2.1 MG/DL (ref 1.6–2.4)
MCH RBC QN AUTO: 25.3 PG (ref 26–34)
MCHC RBC AUTO-ENTMCNC: 30 G/DL (ref 30–36.5)
MCV RBC AUTO: 84.5 FL (ref 80–99)
METHADONE UR QL: NEGATIVE
MONOCYTES # BLD: 0.9 K/UL (ref 0–1)
MONOCYTES NFR BLD: 15 % (ref 5–13)
NEUTS SEG # BLD: 2.9 K/UL (ref 1.8–8)
NEUTS SEG NFR BLD: 47 % (ref 32–75)
NITRITE UR QL STRIP.AUTO: NEGATIVE
NRBC # BLD: 0 K/UL (ref 0–0.01)
NRBC BLD-RTO: 0 PER 100 WBC
OPIATES UR QL: NEGATIVE
PCO2 BLD: 38.7 MMHG (ref 35–45)
PCP UR QL: NEGATIVE
PH BLD: 7.39 [PH] (ref 7.35–7.45)
PH UR STRIP: 7 [PH] (ref 5–8)
PLATELET # BLD AUTO: 141 K/UL (ref 150–400)
PLATELET COMMENTS,PCOM: ABNORMAL
PO2 BLD: 75 MMHG (ref 80–100)
POTASSIUM SERPL-SCNC: 3.7 MMOL/L (ref 3.5–5.1)
PROT SERPL-MCNC: 6.9 G/DL (ref 6.4–8.2)
PROT UR STRIP-MCNC: NEGATIVE MG/DL
RBC # BLD AUTO: 4.46 M/UL (ref 4.1–5.7)
RBC #/AREA URNS HPF: ABNORMAL /HPF (ref 0–5)
RBC MORPH BLD: ABNORMAL
SAMPLES BEING HELD,HOLD: NORMAL
SAO2 % BLD: 95 % (ref 92–97)
SARS-COV-2, COV2: NOT DETECTED
SODIUM SERPL-SCNC: 137 MMOL/L (ref 136–145)
SP GR UR REFRACTOMETRY: 1.02 (ref 1–1.03)
SPECIMEN SOURCE, FCOV2M: NORMAL
SPECIMEN TYPE: ABNORMAL
TRIGL SERPL-MCNC: 153 MG/DL (ref ?–150)
TROPONIN I SERPL-MCNC: <0.05 NG/ML
TSH SERPL DL<=0.05 MIU/L-ACNC: 3.29 UIU/ML (ref 0.36–3.74)
UA: UC IF INDICATED,UAUC: ABNORMAL
UROBILINOGEN UR QL STRIP.AUTO: 1 EU/DL (ref 0.2–1)
VLDLC SERPL CALC-MCNC: 30.6 MG/DL
WBC # BLD AUTO: 6.3 K/UL (ref 4.1–11.1)
WBC URNS QL MICRO: ABNORMAL /HPF (ref 0–4)

## 2020-05-24 PROCEDURE — 82550 ASSAY OF CK (CPK): CPT

## 2020-05-24 PROCEDURE — 87186 SC STD MICRODIL/AGAR DIL: CPT

## 2020-05-24 PROCEDURE — 93005 ELECTROCARDIOGRAM TRACING: CPT

## 2020-05-24 PROCEDURE — 84443 ASSAY THYROID STIM HORMONE: CPT

## 2020-05-24 PROCEDURE — 74011250637 HC RX REV CODE- 250/637: Performed by: HOSPITALIST

## 2020-05-24 PROCEDURE — 96372 THER/PROPH/DIAG INJ SC/IM: CPT

## 2020-05-24 PROCEDURE — 36600 WITHDRAWAL OF ARTERIAL BLOOD: CPT

## 2020-05-24 PROCEDURE — 74011250636 HC RX REV CODE- 250/636: Performed by: HOSPITALIST

## 2020-05-24 PROCEDURE — 87635 SARS-COV-2 COVID-19 AMP PRB: CPT

## 2020-05-24 PROCEDURE — 87086 URINE CULTURE/COLONY COUNT: CPT

## 2020-05-24 PROCEDURE — 83735 ASSAY OF MAGNESIUM: CPT

## 2020-05-24 PROCEDURE — 36415 COLL VENOUS BLD VENIPUNCTURE: CPT

## 2020-05-24 PROCEDURE — 80061 LIPID PANEL: CPT

## 2020-05-24 PROCEDURE — 85379 FIBRIN DEGRADATION QUANT: CPT

## 2020-05-24 PROCEDURE — 80307 DRUG TEST PRSMV CHEM ANLYZR: CPT

## 2020-05-24 PROCEDURE — 87077 CULTURE AEROBIC IDENTIFY: CPT

## 2020-05-24 PROCEDURE — 96374 THER/PROPH/DIAG INJ IV PUSH: CPT

## 2020-05-24 PROCEDURE — 74018 RADEX ABDOMEN 1 VIEW: CPT

## 2020-05-24 PROCEDURE — 96376 TX/PRO/DX INJ SAME DRUG ADON: CPT

## 2020-05-24 PROCEDURE — 96375 TX/PRO/DX INJ NEW DRUG ADDON: CPT

## 2020-05-24 PROCEDURE — 81001 URINALYSIS AUTO W/SCOPE: CPT

## 2020-05-24 PROCEDURE — 99218 HC RM OBSERVATION: CPT

## 2020-05-24 PROCEDURE — 71045 X-RAY EXAM CHEST 1 VIEW: CPT

## 2020-05-24 PROCEDURE — 84484 ASSAY OF TROPONIN QUANT: CPT

## 2020-05-24 PROCEDURE — 82803 BLOOD GASES ANY COMBINATION: CPT

## 2020-05-24 PROCEDURE — 74011250636 HC RX REV CODE- 250/636: Performed by: INTERNAL MEDICINE

## 2020-05-24 PROCEDURE — 99285 EMERGENCY DEPT VISIT HI MDM: CPT

## 2020-05-24 PROCEDURE — 85025 COMPLETE CBC W/AUTO DIFF WBC: CPT

## 2020-05-24 PROCEDURE — 83880 ASSAY OF NATRIURETIC PEPTIDE: CPT

## 2020-05-24 PROCEDURE — 80053 COMPREHEN METABOLIC PANEL: CPT

## 2020-05-24 RX ORDER — AZITHROMYCIN 250 MG/1
500 TABLET, FILM COATED ORAL ONCE
Status: COMPLETED | OUTPATIENT
Start: 2020-05-24 | End: 2020-05-24

## 2020-05-24 RX ORDER — VENLAFAXINE HYDROCHLORIDE 150 MG/1
CAPSULE, EXTENDED RELEASE ORAL DAILY
COMMUNITY

## 2020-05-24 RX ORDER — ENOXAPARIN SODIUM 100 MG/ML
40 INJECTION SUBCUTANEOUS EVERY 24 HOURS
Status: DISCONTINUED | OUTPATIENT
Start: 2020-05-24 | End: 2020-05-30 | Stop reason: HOSPADM

## 2020-05-24 RX ORDER — BISACODYL 5 MG
5 TABLET, DELAYED RELEASE (ENTERIC COATED) ORAL DAILY PRN
Status: DISCONTINUED | OUTPATIENT
Start: 2020-05-24 | End: 2020-05-30 | Stop reason: HOSPADM

## 2020-05-24 RX ORDER — DIPHENHYDRAMINE HYDROCHLORIDE 50 MG/ML
12.5 INJECTION, SOLUTION INTRAMUSCULAR; INTRAVENOUS
Status: DISCONTINUED | OUTPATIENT
Start: 2020-05-24 | End: 2020-05-30 | Stop reason: HOSPADM

## 2020-05-24 RX ORDER — MORPHINE SULFATE 2 MG/ML
INJECTION, SOLUTION INTRAMUSCULAR; INTRAVENOUS
Status: DISPENSED
Start: 2020-05-24 | End: 2020-05-25

## 2020-05-24 RX ORDER — GUAIFENESIN 100 MG/5ML
81 LIQUID (ML) ORAL DAILY
Status: DISCONTINUED | OUTPATIENT
Start: 2020-05-25 | End: 2020-05-30 | Stop reason: HOSPADM

## 2020-05-24 RX ORDER — ACETAMINOPHEN 325 MG/1
650 TABLET ORAL
Status: DISCONTINUED | OUTPATIENT
Start: 2020-05-24 | End: 2020-05-30 | Stop reason: HOSPADM

## 2020-05-24 RX ORDER — NALOXONE HYDROCHLORIDE 0.4 MG/ML
0.4 INJECTION, SOLUTION INTRAMUSCULAR; INTRAVENOUS; SUBCUTANEOUS AS NEEDED
Status: DISCONTINUED | OUTPATIENT
Start: 2020-05-24 | End: 2020-05-30 | Stop reason: HOSPADM

## 2020-05-24 RX ORDER — MORPHINE SULFATE 10 MG/ML
5 INJECTION, SOLUTION INTRAMUSCULAR; INTRAVENOUS ONCE
Status: DISCONTINUED | OUTPATIENT
Start: 2020-05-24 | End: 2020-05-24

## 2020-05-24 RX ORDER — SODIUM CHLORIDE 0.9 % (FLUSH) 0.9 %
5-40 SYRINGE (ML) INJECTION EVERY 8 HOURS
Status: DISCONTINUED | OUTPATIENT
Start: 2020-05-24 | End: 2020-05-30 | Stop reason: HOSPADM

## 2020-05-24 RX ORDER — ONDANSETRON 2 MG/ML
4 INJECTION INTRAMUSCULAR; INTRAVENOUS
Status: DISCONTINUED | OUTPATIENT
Start: 2020-05-24 | End: 2020-05-30 | Stop reason: HOSPADM

## 2020-05-24 RX ORDER — AZITHROMYCIN 250 MG/1
250 TABLET, FILM COATED ORAL DAILY
Status: DISCONTINUED | OUTPATIENT
Start: 2020-05-25 | End: 2020-05-26

## 2020-05-24 RX ORDER — MORPHINE SULFATE 2 MG/ML
2 INJECTION, SOLUTION INTRAMUSCULAR; INTRAVENOUS
Status: DISCONTINUED | OUTPATIENT
Start: 2020-05-24 | End: 2020-05-30 | Stop reason: HOSPADM

## 2020-05-24 RX ORDER — SODIUM CHLORIDE 0.9 % (FLUSH) 0.9 %
5-40 SYRINGE (ML) INJECTION AS NEEDED
Status: DISCONTINUED | OUTPATIENT
Start: 2020-05-24 | End: 2020-05-30 | Stop reason: HOSPADM

## 2020-05-24 RX ORDER — LABETALOL HYDROCHLORIDE 5 MG/ML
10 INJECTION, SOLUTION INTRAVENOUS
Status: DISCONTINUED | OUTPATIENT
Start: 2020-05-24 | End: 2020-05-30 | Stop reason: HOSPADM

## 2020-05-24 RX ADMIN — MORPHINE SULFATE 2 MG: 2 INJECTION, SOLUTION INTRAMUSCULAR; INTRAVENOUS at 22:07

## 2020-05-24 RX ADMIN — ENOXAPARIN SODIUM 40 MG: 40 INJECTION SUBCUTANEOUS at 08:50

## 2020-05-24 RX ADMIN — ONDANSETRON HYDROCHLORIDE 4 MG: 2 INJECTION, SOLUTION INTRAMUSCULAR; INTRAVENOUS at 09:09

## 2020-05-24 RX ADMIN — MORPHINE SULFATE 2 MG: 2 INJECTION, SOLUTION INTRAMUSCULAR; INTRAVENOUS at 13:05

## 2020-05-24 RX ADMIN — Medication 10 ML: at 21:56

## 2020-05-24 RX ADMIN — ONDANSETRON HYDROCHLORIDE 4 MG: 2 INJECTION, SOLUTION INTRAMUSCULAR; INTRAVENOUS at 12:56

## 2020-05-24 RX ADMIN — Medication 10 ML: at 13:12

## 2020-05-24 RX ADMIN — AZITHROMYCIN MONOHYDRATE 500 MG: 250 TABLET ORAL at 13:22

## 2020-05-24 RX ADMIN — Medication 10 ML: at 06:00

## 2020-05-24 NOTE — PROGRESS NOTES
During assessment Pt identified pain as \"in ribcage\" and stated he had fallen 1 week prior, and that pain had been occurring since then. No c/o chest pain during assessment. Verbal shift change report given to Rimma RN (oncoming nurse) by Genoveva Mckeon RN (offgoing nurse). Report included the following information SBAR, Kardex, ED Summary, MAR, Recent Results, Med Rec Status and Cardiac Rhythm NSR. Patient Vitals for the past 12 hrs:   Temp Pulse Resp BP SpO2   05/24/20 0605 97.1 °F (36.2 °C) 81 16 154/87 96 %   05/24/20 0516  81 22 136/70 93 %   05/24/20 0400  81 20 (!) 132/36 92 %   05/24/20 0300  84 19 140/77 92 %   05/24/20 0200  88 23 137/82 93 %   05/24/20 0138 98.5 °F (36.9 °C) 92 18 135/63 94 %         Problem: Pressure Injury - Risk of  Goal: *Prevention of pressure injury  Description: Document Murphy Scale and appropriate interventions in the flowsheet. Outcome: Progressing Towards Goal  Note: Pressure Injury Interventions:  Sensory Interventions: Assess changes in LOC, Assess need for specialty bed, Check visual cues for pain, Float heels, Keep linens dry and wrinkle-free, Maintain/enhance activity level, Minimize linen layers    Moisture Interventions: Absorbent underpads, Assess need for specialty bed, Check for incontinence Q2 hours and as needed, Maintain skin hydration (lotion/cream), Minimize layers    Activity Interventions: Assess need for specialty bed, Increase time out of bed, Pressure redistribution bed/mattress(bed type)    Mobility Interventions: Assess need for specialty bed, Float heels, Pressure redistribution bed/mattress (bed type)    Nutrition Interventions: Document food/fluid/supplement intake, Offer support with meals,snacks and hydration                     Problem: Falls - Risk of  Goal: *Absence of Falls  Description: Document Edouard Fall Risk and appropriate interventions in the flowsheet.   Outcome: Progressing Towards Goal  Note: Fall Risk Interventions:  Mobility Interventions: Bed/chair exit alarm, Communicate number of staff needed for ambulation/transfer, Patient to call before getting OOB, Utilize walker, cane, or other assistive device, Utilize gait belt for transfers/ambulation    Mentation Interventions: Adequate sleep, hydration, pain control, Increase mobility, More frequent rounding, Reorient patient, Room close to nurse's station         Elimination Interventions: Bed/chair exit alarm, Call light in reach, Patient to call for help with toileting needs, Stay With Me (per policy), Urinal in reach, Toileting schedule/hourly rounds    History of Falls Interventions: Bed/chair exit alarm, Room close to nurse's station

## 2020-05-24 NOTE — PROGRESS NOTES
1310- Patient states he is having chest pain and nausea. RN asked patient to point to where it hurts. Patient pointed to mid chest. RRT called for chest pain. /83. Dr. Ventura Mccoy notified. Orders received from Dr. Ventura Mccoy for EKG, troponin, and 2mg IV Morphine. All orders completed. RN asked patient if he is still having pain. Patient states \"yes\" and pointed to his L and R side. Will continue to monitor.

## 2020-05-24 NOTE — PROGRESS NOTES
Bedside shift change report given to Fabian Forbes RN by Marcia Franco RN. Report included the following information SBAR, Kardex, ED Summary, Intake/Output, MAR, Recent Results and Cardiac Rhythm NSR.

## 2020-05-24 NOTE — ROUTINE PROCESS
TRANSFER - OUT REPORT: 
 
Verbal report given to Gabrielle (name) on Jasmeet Pelletier  being transferred to [facility] (unit) for routine progression of care Report consisted of patients Situation, Background, Assessment and  
Recommendations(SBAR). Information from the following report(s) SBAR, ED Summary and Recent Results was reviewed with the receiving nurse. Lines:    
 
Opportunity for questions and clarification was provided. Patient transported with: 
 Monitor Registered Nurse

## 2020-05-24 NOTE — PROGRESS NOTES
Patient was admitted earlier today by my colleague for chest pain  Patient reports feeling better and suggest pain may be attributed to rib pain. He denies shortness of breath. I reviewed his labs and serial troponin is negative. EKG non ischemic  He has laboratory and imaging consistent with cirrhosis    Plan: As per cardiology recs. He has an allergy to iodine limiting ordering of CTA of the chest to r/o other causes of chest pain including aortic aneurysm.   Get transthoracic echocardiogram  Consult Hepatology re-evaluation of cirrhosis

## 2020-05-24 NOTE — CONSULTS
Consult received re patient reporting chest pain. Being ruled out for COVID, so this is a chart review consultation. H&P documents chest pain that came on at 2200hrs last night  Poor historian per H&P  Nursing today reports he was indicating epigastric pain at times, and other times pointing to his chest.    No prior cardiac history    Medical history:  -Type II diabetes  -IBS  -Prior stroke  -Cirrhosis    CXR:  IMPRESSION: Depth of inspiration is shallow. There is slight increasing  bibasilar haziness/atelectasis which may represent an element of interstitial  edema or possibly atypical pneumonia. EKG x 2 tracings - NSR with no ischemic changes.     Troponin - negative x 2    COVID - pending    Impression:  1) Consistent with non-cardiac chest pain - no ischemic EKG changes and serial negative troponin  Ruled out for AMI  Other non-cardiac causes of CP should be considered   Consider CTA chest to f/u aortic dissection, PE    2) Possible COVID    Recs:  From CV standpoint he can be discharged   Suggest OP stress test through our office to further risk stratify

## 2020-05-24 NOTE — ED TRIAGE NOTES
EMS reports the pt has been having CP since 2200. EMS reports giving 324 aspirin. EMS reports a BS of 84.

## 2020-05-24 NOTE — PROGRESS NOTES
TRANSFER - IN REPORT:    Verbal report received from [address] (name) on Kaleigh Caballerooln  being received from ED (unit) for routine progression of care      Report consisted of patients Situation, Background, Assessment and   Recommendations(SBAR). Information from the following report(s) SBAR, Kardex, ED Summary, MAR, Med Rec Status and Cardiac Rhythm NSR was reviewed with the receiving nurse. Opportunity for questions and clarification was provided. Assessment completed upon patients arrival to unit and care assumed. Primary Nurse Jim Rice RN and Jian Middleton RN performed a dual skin assessment on this patient No impairment noted  Murphy score is 17        Per Pt's request, attempted to call dtr [facility]. Wrong number. Will try again later. Paged RT for ABG order.

## 2020-05-24 NOTE — H&P
[address] [facility] Adult  Hospitalist Group  History and Physical    Primary Care Provider: None  Date of Service:  5/24/2020    CC: chest pain    Subjective:     75year old male with past medical history DENG, diabetes, CVA with left sided deficits presenting to [facility] via EMS with chest pain that started at 2200 and given aspirin 324 mg. Patient poor historian and history obtained at 5 am. Currently, patient difficult to arouse but does state that his chest pain is centralized. Unable to give further details including pain scale, radiation, descriptive features. Per ED physician, patient endorsed nonproductive cough, nausea without vomiting. Denied fevers, chills. In the ED, chest x-ray with slight increasing bibasilar haziness/atelectasis, may represent interstitial edema or possibly atypical pneumonia. EKG without ST changes. Troponin wnl, D-dimer 0.7. Hospitalist consulted for further evaluation/admission. Review of Systems:    Unable to obtain     Past Medical History:   Diagnosis Date    Diabetes ([facility] [address]     Gastrointestinal disorder     IBS    Hypertension     Psychiatric disorder     depression, anxiety    Sleep disorder     DENG, CPAP @ night    Stroke ([facility] [address] 1993    L sided deficit      Past Surgical History:   Procedure Laterality Date    HX CHOLECYSTECTOMY      HX KNEE REPLACEMENT      Pt stated    NEUROLOGICAL PROCEDURE UNLISTED       Prior to Admission medications    Medication Sig Start Date End Date Taking? Authorizing Provider   ondansetron HCl (ZOFRAN PO) Take  by mouth. Yes Other, MD Keisha   venlafaxine-SR (Effexor XR) 150 mg capsule Take  by mouth daily. Yes Other, MD Keisha   levothyroxine sodium (SYNTHROID PO) Take  by mouth. Yes Other, MD Keisha   amoxicillin 500 mg tab Take 500 mg by mouth three (3) times daily.  3/7/19   Olena Fonseca MD   lactobacillus acidoph-pectin (ACIDOPHILUS-PECTIN) 75 million cell -100 mg cap capsule Take 1 Cap by mouth two (2) times a day. 3/7/19   Sunitha Ramirez MD     Allergies   Allergen Reactions    Iodine Other (comments)     Pt states his creatinine level increases when he gets IV contrast      History reviewed. No pertinent family history. SOCIAL HISTORY:  Patient resides at home  Patient ambulates with unknown   Smoking history: unknown  Alcohol history: unknown         Objective:       Physical Exam:   Visit Vitals  /70   Pulse 81   Temp 98.5 °F (36.9 °C)   Resp 22   Ht 5' 7\" (1.702 m)   Wt 122.5 kg (270 lb)   SpO2 93%   BMI 42.29 kg/m²     General appearance: Alert to name and place, not cooperative with exam, difficult to arouse  Head: Normocephalic, without obvious abnormality, atraumatic  Lungs: Coarse breath sounds bilaterally  Chest: multiple scab on anterior chest   Heart: regular rate and rhythm, S1, S2 normal, no murmur, click, rub or gallop  Abdomen: soft, non-tender, obese. Bowel sounds normal. No masses,  no organomegaly  Extremities: extremities normal, atraumatic, no cyanosis or edema  Pulses: 2+ and symmetric  Skin: Skin color, texture, turgor normal. No rashes or lesions  Neurologic: Appears to have left upper extremity weakness. Overall, appears globally weak  Cap refill: Brisk, less than 3 seconds  Pulses: 2+, symmetric in all extremities    ECG:  Normal sinus rhythm      Data Review: All diagnostic labs and studies have been reviewed. Chest x-ray: IMPRESSION: Depth of inspiration is shallow. There is slight increasing  bibasilar haziness/atelectasis which may represent an element of interstitial  edema or possibly atypical pneumonia. Assessment:     Active Problems:    Chest pain (5/24/2020)        Plan:     Chest pain:  -presenting with sudden onset chest heaviness  -EKG without ST changes, troponin wnl  -monitor on telemetry, trend troponins, check lipid panel      Acute encephalopathy:  -per chart review, has been limited historian in past  -check UA, urine drug screen, ABG. Previous admission for encephalopathy suspected secondary to UTI    Interstitial edema:  -check pro-BNP, consider echo  -COVID-19 test sent  -patient currently on room air     History of CVA: initiate aspirin     Hypothyroidism: check TSH, restart levothyroxine when medications reconciled  Depression: home medications when able     Reconcile medications when able    NPO for now    DVT: lovenox  Code: full by default     Signed By: Faustino Young DO     May 24, 2020

## 2020-05-24 NOTE — PROGRESS NOTES
Problem: Pressure Injury - Risk of  Goal: *Prevention of pressure injury  Description: Document Murphy Scale and appropriate interventions in the flowsheet. Outcome: Progressing Towards Goal  Note: Pressure Injury Interventions:  Sensory Interventions: Assess need for specialty bed, Keep linens dry and wrinkle-free, Minimize linen layers, Pressure redistribution bed/mattress (bed type)    Moisture Interventions: Absorbent underpads, Assess need for specialty bed, Limit adult briefs, Minimize layers    Activity Interventions: Assess need for specialty bed, Increase time out of bed, Pressure redistribution bed/mattress(bed type)    Mobility Interventions: Assess need for specialty bed, HOB 30 degrees or less, Pressure redistribution bed/mattress (bed type)    Nutrition Interventions: Document food/fluid/supplement intake, Offer support with meals,snacks and hydration      Problem: Hypertension  Goal: *Blood pressure within specified parameters  Outcome: Progressing Towards Goal  Note: Patients blood pressure within defined normal limits. Will continue to monitor.

## 2020-05-24 NOTE — ED PROVIDER NOTES
HPI   74 yo M presents with substernal chest pain onset 2 hours ago while at rest. C/o heaviness. No hx of similar pain. C/o cough, nonproductive. Denies fever, chills. C/o nausea, no vomiting. Last BM few hours ago, diarrhea, no bloody or black stools. Past Medical History:   Diagnosis Date    Diabetes ([facility] [address]     Gastrointestinal disorder     IBS    Hypertension     Psychiatric disorder     depression, anxiety    Sleep disorder     DENG, CPAP @ night    Stroke ([facility] 1993    L sided deficit       Past Surgical History:   Procedure Laterality Date    HX CHOLECYSTECTOMY      HX KNEE REPLACEMENT      Pt stated    NEUROLOGICAL PROCEDURE UNLISTED           History reviewed. No pertinent family history.     Social History     Socioeconomic History    Marital status: SINGLE     Spouse name: Not on file    Number of children: Not on file    Years of education: Not on file    Highest education level: Not on file   Occupational History    Not on file   Social Needs    Financial resource strain: Not on file    Food insecurity     Worry: Not on file     Inability: Not on file    Transportation needs     Medical: Not on file     Non-medical: Not on file   Tobacco Use    Smoking status: Never Smoker    Smokeless tobacco: Never Used   Substance and Sexual Activity    Alcohol use: No     Frequency: Never    Drug use: No    Sexual activity: Never   Lifestyle    Physical activity     Days per week: Not on file     Minutes per session: Not on file    Stress: Not on file   Relationships    Social connections     Talks on phone: Not on file     Gets together: Not on file     Attends Che[employer] service: Not on file     Active member of club or organization: Not on file     Attends meetings of clubs or organizations: Not on file     Relationship status: Not on file    Intimate partner violence     Fear of current or ex partner: Not on file     Emotionally abused: Not on file     Physically abused: Not on file Forced sexual activity: Not on file   Other Topics Concern    Not on file   Social History Narrative    Not on file         ALLERGIES: Iodine    Review of Systems   Constitutional: Negative for chills and fever. HENT: Negative for sore throat. Respiratory: Positive for cough and shortness of breath. Cardiovascular: Positive for chest pain. Negative for leg swelling. Gastrointestinal: Positive for diarrhea and nausea. Negative for abdominal pain, blood in stool, constipation and vomiting. Genitourinary: Negative for dysuria and hematuria. Skin: Negative for rash. Neurological: Negative for weakness, numbness and headaches. All other systems reviewed and are negative.       Vitals:    05/24/20 0138   BP: 135/63   Pulse: 92   Resp: 18   Temp: 98.5 °F (36.9 °C)   SpO2: 94%   Weight: 122.5 kg (270 lb)   Height: 5' 7\" (1.702 m)            Physical Exam   Physical Examination: General appearance - alert, well appearing, and in no distress, oriented to person, place, and time and normal appearing weight  Eyes - pupils equal and reactive, extraocular eye movements intact  Neck - supple, no significant adenopathy  Chest - clear to auscultation, no wheezes, rales or rhonchi, symmetric air entry  Heart - normal rate, regular rhythm, normal S1, S2, no murmurs, rubs, clicks or gallops  Abdomen - soft, nontender, nondistended, no masses or organomegaly  Back exam - full range of motion, no tenderness, palpable spasm or pain on motion  Neurological - alert, oriented, normal speech, no focal findings or movement disorder noted  Musculoskeletal - no joint tenderness, deformity or swelling  Extremities - peripheral pulses normal, no pedal edema, no clubbing or cyanosis  Skin - normal coloration and turgor, no rashes, no suspicious skin lesions noted  MDM  Number of Diagnoses or Management Options     Amount and/or Complexity of Data Reviewed  Clinical lab tests: ordered and reviewed  Tests in the radiology section of CPT®: ordered and reviewed  Decide to obtain previous medical records or to obtain history from someone other than the patient: yes  Obtain history from someone other than the patient: yes (EMS)  Review and summarize past medical records: yes  Discuss the patient with other providers: yes (hospitalist)  Independent visualization of images, tracings, or specimens: yes    Patient Progress  Patient progress: improved         Procedures  ED EKG interpretation:  Rhythm: normal sinus rhythm; and regular .  Rate (approx.): 90; Axis: left axis deviation; P wave: normal; QRS interval: normal ; ST/T wave: non-specific changes; no significant changes from previous EKG  EKG documented by Zac Seo MD    Hospitalist Perfect Serve for Admission  4:41 AM    ED Room Number: ER12/12  Patient Name and age:  Enoc Dunlap 75 y.o.  male  Working Diagnosis: chest pain, shortness of breath  COVID-19 Suspicion:  yes    Code Status:  Full Code  Readmission: no  Isolation Requirements:  yes  Recommended Level of Care:  telemetry  Department:[facility] Adult ED - [phone]

## 2020-05-25 PROBLEM — R41.82 AMS (ALTERED MENTAL STATUS): Status: ACTIVE | Noted: 2020-05-25

## 2020-05-25 LAB
ATRIAL RATE: 86 BPM
ATRIAL RATE: 90 BPM
CALCULATED P AXIS, ECG09: 32 DEGREES
CALCULATED P AXIS, ECG09: 38 DEGREES
CALCULATED R AXIS, ECG10: -65 DEGREES
CALCULATED R AXIS, ECG10: -68 DEGREES
CALCULATED T AXIS, ECG11: 37 DEGREES
CALCULATED T AXIS, ECG11: 54 DEGREES
DIAGNOSIS, 93000: NORMAL
DIAGNOSIS, 93000: NORMAL
P-R INTERVAL, ECG05: 154 MS
P-R INTERVAL, ECG05: 168 MS
Q-T INTERVAL, ECG07: 394 MS
Q-T INTERVAL, ECG07: 400 MS
QRS DURATION, ECG06: 96 MS
QRS DURATION, ECG06: 98 MS
QTC CALCULATION (BEZET), ECG08: 471 MS
QTC CALCULATION (BEZET), ECG08: 489 MS
VENTRICULAR RATE, ECG03: 86 BPM
VENTRICULAR RATE, ECG03: 90 BPM

## 2020-05-25 PROCEDURE — 96375 TX/PRO/DX INJ NEW DRUG ADDON: CPT

## 2020-05-25 PROCEDURE — 74011250637 HC RX REV CODE- 250/637: Performed by: HOSPITALIST

## 2020-05-25 PROCEDURE — 74011250636 HC RX REV CODE- 250/636: Performed by: HOSPITALIST

## 2020-05-25 PROCEDURE — 74011250636 HC RX REV CODE- 250/636: Performed by: INTERNAL MEDICINE

## 2020-05-25 PROCEDURE — 74011250637 HC RX REV CODE- 250/637: Performed by: INTERNAL MEDICINE

## 2020-05-25 PROCEDURE — 65660000000 HC RM CCU STEPDOWN

## 2020-05-25 PROCEDURE — 99218 HC RM OBSERVATION: CPT

## 2020-05-25 PROCEDURE — 96376 TX/PRO/DX INJ SAME DRUG ADON: CPT

## 2020-05-25 PROCEDURE — 96372 THER/PROPH/DIAG INJ SC/IM: CPT

## 2020-05-25 PROCEDURE — C9113 INJ PANTOPRAZOLE SODIUM, VIA: HCPCS | Performed by: HOSPITALIST

## 2020-05-25 PROCEDURE — 74011000258 HC RX REV CODE- 258: Performed by: HOSPITALIST

## 2020-05-25 PROCEDURE — 74011000250 HC RX REV CODE- 250: Performed by: HOSPITALIST

## 2020-05-25 RX ORDER — HYDRALAZINE HYDROCHLORIDE 20 MG/ML
10 INJECTION INTRAMUSCULAR; INTRAVENOUS
Status: DISCONTINUED | OUTPATIENT
Start: 2020-05-25 | End: 2020-05-30 | Stop reason: HOSPADM

## 2020-05-25 RX ORDER — METOPROLOL TARTRATE 25 MG/1
12.5 TABLET, FILM COATED ORAL 2 TIMES DAILY
Status: DISCONTINUED | OUTPATIENT
Start: 2020-05-25 | End: 2020-05-30 | Stop reason: HOSPADM

## 2020-05-25 RX ORDER — PRIMIDONE 50 MG/1
50 TABLET ORAL 3 TIMES DAILY
Status: DISCONTINUED | OUTPATIENT
Start: 2020-05-25 | End: 2020-05-29

## 2020-05-25 RX ADMIN — Medication 10 ML: at 07:11

## 2020-05-25 RX ADMIN — PRIMIDONE 50 MG: 50 TABLET ORAL at 21:02

## 2020-05-25 RX ADMIN — METOPROLOL TARTRATE 12.5 MG: 25 TABLET, FILM COATED ORAL at 20:49

## 2020-05-25 RX ADMIN — CEFTRIAXONE SODIUM 1 G: 1 INJECTION, POWDER, FOR SOLUTION INTRAMUSCULAR; INTRAVENOUS at 11:10

## 2020-05-25 RX ADMIN — ONDANSETRON HYDROCHLORIDE 4 MG: 2 INJECTION, SOLUTION INTRAMUSCULAR; INTRAVENOUS at 11:10

## 2020-05-25 RX ADMIN — MORPHINE SULFATE 2 MG: 2 INJECTION, SOLUTION INTRAMUSCULAR; INTRAVENOUS at 04:05

## 2020-05-25 RX ADMIN — AZITHROMYCIN MONOHYDRATE 250 MG: 250 TABLET ORAL at 08:39

## 2020-05-25 RX ADMIN — SODIUM CHLORIDE 40 MG: 9 INJECTION INTRAMUSCULAR; INTRAVENOUS; SUBCUTANEOUS at 08:39

## 2020-05-25 RX ADMIN — Medication 10 ML: at 21:02

## 2020-05-25 RX ADMIN — ENOXAPARIN SODIUM 40 MG: 40 INJECTION SUBCUTANEOUS at 08:40

## 2020-05-25 RX ADMIN — MORPHINE SULFATE 2 MG: 2 INJECTION, SOLUTION INTRAMUSCULAR; INTRAVENOUS at 20:58

## 2020-05-25 RX ADMIN — MORPHINE SULFATE 2 MG: 2 INJECTION, SOLUTION INTRAMUSCULAR; INTRAVENOUS at 12:24

## 2020-05-25 RX ADMIN — METOPROLOL TARTRATE 12.5 MG: 25 TABLET, FILM COATED ORAL at 12:26

## 2020-05-25 RX ADMIN — ASPIRIN 81 MG 81 MG: 81 TABLET ORAL at 08:39

## 2020-05-25 RX ADMIN — ONDANSETRON HYDROCHLORIDE 4 MG: 2 INJECTION, SOLUTION INTRAMUSCULAR; INTRAVENOUS at 14:51

## 2020-05-25 RX ADMIN — Medication 1 CAPSULE: at 12:25

## 2020-05-25 NOTE — PHYSICIAN ADVISORY
Letter of Status Determination:  
Recommend hospitalization status upgraded from INPATIENT  to OBSERVATION Status Pt Name:  Michelle Howard MR#  
EDWARD # [id number] / 
[id number] Payor: Zarina Soto / Plan: [address] / Product Type: Medicare /   
DANIEL#  [id number] Room and Hospital  412/01  @ [facility] hospital  
Hospitalization date  5/24/2020  1:33 AM  
Current Attending Physician  Tiffany Bauer MD  
Principal diagnosis  Chest pain [R07.9] AMS (altered mental status) [R41.82] Clinicals  75 y.o. y.o  male hospitalized with above diagnosis Consistent with non-cardiac chest pain - no ischemic EKG changes and serial negative troponin Ruled out for AMI Other non-cardiac causes of CP should be considered Consider CTA chest to f/u aortic dissection, PE Patient was admitted earlier today by my colleague for chest pain Patient reports feeling better and suggest pain may be attributed to rib pain. He denies shortness of breath. I reviewed his labs and serial troponin is negative. EKG non ischemic He has laboratory and imaging consistent with cirrhosis 
  
Plan: As per cardiology recs. He has an allergy to iodine limiting ordering of CTA of the chest to r/o other causes of chest pain including aortic aneurysm. Get transthoracic echocardiogram 
Consult Hepatology re-evaluation of cirrhosis [place] (MCG) criteria Does  apply STATUS DETERMINATION   
  OBSERVATION Additional comments Payor: Zarina Soto / Plan: [address] / Product Type: Medicare /   
  
 
 
Pineda Borrego [address] [facility] T: [phone]    [email or web address]

## 2020-05-25 NOTE — PROGRESS NOTES
[address] [facility] Adult  Hospitalist Group                                                                                          Hospitalist Progress Note  Dallin Dubose MD  Answering service: [phone] OR [phone] from in house phone        Date of Service:  2020  NAME:  Scarlette Pallas  :  1945  MRN:  [id number]      Admission Summary:   75year old male with past medical history DENG, diabetes, CVA with left sided deficits presenting to [facility] via EMS with chest pain that started at 2200 and given aspirin 324 mg. Patient poor historian and history obtained at 5 am. Currently, patient difficult to arouse but does state that his chest pain is centralized. Unable to give further details including pain scale, radiation, descriptive features. Per ED physician, patient endorsed nonproductive cough, nausea without vomiting. Denied fevers, chills. In the ED, chest x-ray with slight increasing bibasilar haziness/atelectasis, may represent interstitial edema or possibly atypical pneumonia. EKG without ST changes. Troponin wnl, D-dimer 0.7. Hospitalist consulted for further evaluation/admission.      Interval history / Subjective:   Seen and examined patient at bedside patient was not wearing his oxygen and saturation was low on the monitor in 85-90 placed back on 2 L nasal cannula it went up to 90+ patient has no symptoms patient seems to be little confused did not voice any acute issues but telling me chest hurts  He was alert he was awake he could discontinue to talk with me but it seems to be he is little confused or demented     SIRS- COV 2 test negative from 524  Assessment & Plan:     Atypical chest pain  -presenting with sudden onset chest heaviness troponin x2- EKG normal sinus rhythm no ischemic changes seen by cardiology no further management planned  -Question pain from noncardiac origin  -Please saturating well with 2 L nasal cannula dimer slightly elevated on 5/24  -Patient's creatinine is 1.23 will hydrate the patient and if creatinine comes down may get a CTA if creatinine remains elevated may be benefited from a VQ scan     Acute encephalopathy:  -per chart review, has been limited historian in past  -check UA, urine drug screen, ABG. Previous admission for encephalopathy suspected secondary to UTI patient is currently on Zithromax we will add ceftriaxone     Interstitial edema:  -check pro-BNP, consider echo  -COVID-19 test negative  -patient currently on liters nasal cannula     History of CVA: initiate aspirin   Pt have left hand tremor at rest , probably essential tremor started primidone on BB as well  Will get a neurology input tomoorw     Hypothyroidism: check TSH, restart levothyroxine when medications reconciled    Depression: home medications when able      Reconcile medications when able     Can have diet regular cardiac     Code status: Full  DVT prophylaxis: Lovenox    Care Plan discussed with: Patient/Family and Nurse  Anticipated Disposition: Home w/Family  Anticipated Discharge: 24 hours to 48 hours     Hospital Problems  Date Reviewed: 3/6/2019          Codes Class Noted POA    Chest pain ICD-10-CM: R07.9  ICD-9-CM: 786.50  5/24/2020 Unknown                Review of Systems:   Review of systems not obtained due to patient factors. Vital Signs:    Last 24hrs VS reviewed since prior progress note. Most recent are:  Visit Vitals  BP (!) 153/99   Pulse 72   Temp 97.3 °F (36.3 °C)   Resp 21   Ht 5' 7\" (1.702 m)   Wt 104.3 kg (229 lb 15 oz)   SpO2 94%   BMI 36.01 kg/m²         Intake/Output Summary (Last 24 hours) at 5/25/2020 1029  Last data filed at 5/24/2020 2314  Gross per 24 hour   Intake    Output 750 ml   Net -750 ml        Physical Examination:             Constitutional:  No acute distress, cooperative, pleasant    ENT:  Oral mucosa moist, oropharynx benign. Resp:  CTA bilaterally. No wheezing/rhonchi/rales.  No accessory muscle use   CV: Regular rhythm, normal rate, no murmurs, gallops, rubs    GI:  Soft, non distended, non tender. normoactive bowel sounds, no hepatosplenomegaly     Musculoskeletal:  No edema, warm, 2+ pulses throughout    Neurologic:  Moves all extremities. AAOx0, CN II-XII reviewed     Psych:  Good insight, Not anxious nor agitated. Data Review:    I personally reviewed  Image and labs      Labs:     Recent Labs     05/24/20 0149   WBC 6.3   HGB 11.3*   HCT 37.7   *     Recent Labs     05/24/20 0149      K 3.7   *   CO2 20*   BUN 16   CREA 1.23   *   CA 8.9   MG 2.1     Recent Labs     05/24/20 0149   SGOT 57*   ALT 48   *   TBILI 0.2   TP 6.9   ALB 2.6*   GLOB 4.3*     No results for input(s): INR, PTP, APTT, INREXT in the last 72 hours. No results for input(s): FE, TIBC, PSAT, FERR in the last 72 hours. No results found for: FOL, RBCF   No results for input(s): PH, PCO2, PO2 in the last 72 hours.   Recent Labs     05/24/20  1309 05/24/20  0858 05/24/20 0149   TROIQ <0.05 <0.05 <0.05     Lab Results   Component Value Date/Time    Cholesterol, total 173 05/24/2020 08:56 AM    HDL Cholesterol 43 05/24/2020 08:56 AM    LDL, calculated 99.4 05/24/2020 08:56 AM    Triglyceride 153 (H) 05/24/2020 08:56 AM    CHOL/HDL Ratio 4.0 05/24/2020 08:56 AM     Lab Results   Component Value Date/Time    Glucose (POC) 100 03/05/2019 10:43 PM     Lab Results   Component Value Date/Time    Color YELLOW/STRAW 05/24/2020 08:56 AM    Appearance TURBID (A) 05/24/2020 08:56 AM    Specific gravity 1.020 05/24/2020 08:56 AM    pH (UA) 7.0 05/24/2020 08:56 AM    Protein Negative 05/24/2020 08:56 AM    Glucose Negative 05/24/2020 08:56 AM    Ketone Negative 05/24/2020 08:56 AM    Bilirubin Negative 05/24/2020 08:56 AM    Urobilinogen 1.0 05/24/2020 08:56 AM    Nitrites Negative 05/24/2020 08:56 AM    Leukocyte Esterase SMALL (A) 05/24/2020 08:56 AM    Epithelial cells MODERATE (A) 05/24/2020 08:56 AM    Bacteria 4+ (A) 05/24/2020 08:56 AM    WBC 10-20 05/24/2020 08:56 AM    RBC 0-5 05/24/2020 08:56 AM         Medications Reviewed:     Current Facility-Administered Medications   Medication Dose Route Frequency    sodium chloride (NS) flush 5-40 mL  5-40 mL IntraVENous Q8H    sodium chloride (NS) flush 5-40 mL  5-40 mL IntraVENous PRN    acetaminophen (TYLENOL) tablet 650 mg  650 mg Oral Q4H PRN    naloxone (NARCAN) injection 0.4 mg  0.4 mg IntraVENous PRN    diphenhydrAMINE (BENADRYL) injection 12.5 mg  12.5 mg IntraVENous Q4H PRN    ondansetron (ZOFRAN) injection 4 mg  4 mg IntraVENous Q4H PRN    bisacodyL (DULCOLAX) tablet 5 mg  5 mg Oral DAILY PRN    enoxaparin (LOVENOX) injection 40 mg  40 mg SubCUTAneous Q24H    aspirin chewable tablet 81 mg  81 mg Oral DAILY    azithromycin (ZITHROMAX) tablet 250 mg  250 mg Oral DAILY    morphine injection 2 mg  2 mg IntraVENous Q4H PRN    labetaloL (NORMODYNE;TRANDATE) injection 10 mg  10 mg IntraVENous Q4H PRN    pantoprazole (PROTONIX) 40 mg in 0.9% sodium chloride 10 mL injection  40 mg IntraVENous DAILY     ______________________________________________________________________  EXPECTED LENGTH OF STAY: - - -  ACTUAL LENGTH OF STAY:          0                 Shaylee Madsen MD

## 2020-05-25 NOTE — PROGRESS NOTES
Problem: Pressure Injury - Risk of  Goal: *Prevention of pressure injury  Description: Document Murphy Scale and appropriate interventions in the flowsheet. Outcome: Progressing Towards Goal  Note: Pressure Injury Interventions:  Sensory Interventions: Assess changes in LOC, Check visual cues for pain, Discuss PT/OT consult with provider, Keep linens dry and wrinkle-free, Minimize linen layers, Pressure redistribution bed/mattress (bed type), Turn and reposition approx. every two hours (pillows and wedges if needed)    Moisture Interventions: Absorbent underpads, Apply protective barrier, creams and emollients, Check for incontinence Q2 hours and as needed, Limit adult briefs, Maintain skin hydration (lotion/cream), Minimize layers, Moisture barrier    Activity Interventions: Increase time out of bed, Pressure redistribution bed/mattress(bed type), PT/OT evaluation    Mobility Interventions: Pressure redistribution bed/mattress (bed type), Turn and reposition approx. every two hours(pillow and wedges), PT/OT evaluation    Nutrition Interventions: Document food/fluid/supplement intake                     Problem: Falls - Risk of  Goal: *Absence of Falls  Description: Document Clydene Bone Fall Risk and appropriate interventions in the flowsheet.   Outcome: Progressing Towards Goal  Note: Fall Risk Interventions:  Mobility Interventions: Communicate number of staff needed for ambulation/transfer, Patient to call before getting OOB, PT Consult for mobility concerns, Utilize walker, cane, or other assistive device    Mentation Interventions: Adequate sleep, hydration, pain control, Increase mobility, More frequent rounding, Reorient patient, Room close to nurse's station, Toileting rounds, Update white board    Medication Interventions: Evaluate medications/consider consulting pharmacy, Patient to call before getting OOB, Teach patient to arise slowly, Bed/chair exit alarm    Elimination Interventions: Call light in reach, Patient to call for help with toileting needs, Stay With Me (per policy), Toileting schedule/hourly rounds, Urinal in reach, Toilet paper/wipes in reach    History of Falls Interventions: Evaluate medications/consider consulting pharmacy, Room close to nurse's station         Problem: Hypertension  Goal: *Blood pressure within specified parameters  Outcome: Progressing Towards Goal  Note: Blood pressure being managed well. Last /73. Bedside and Verbal shift change report given to Joao Pineda RN (oncoming nurse) by Debbie Long RN (offgoing nurse). Report included the following information SBAR, Kardex, Intake/Output, MAR, Recent Results, Cardiac Rhythm NSR and Quality Measures.

## 2020-05-25 NOTE — PROGRESS NOTES
1700: patient c/o of left arm tremor. RN and MD noticed this tremor this am, but patient is insisting that this is new and worse. Neurology consult placed.

## 2020-05-26 LAB
ANION GAP SERPL CALC-SCNC: 7 MMOL/L (ref 5–15)
BACTERIA SPEC CULT: ABNORMAL
BUN SERPL-MCNC: 13 MG/DL (ref 6–20)
BUN/CREAT SERPL: 11 (ref 12–20)
CALCIUM SERPL-MCNC: 8.6 MG/DL (ref 8.5–10.1)
CC UR VC: ABNORMAL
CHLORIDE SERPL-SCNC: 105 MMOL/L (ref 97–108)
CO2 SERPL-SCNC: 23 MMOL/L (ref 21–32)
CREAT SERPL-MCNC: 1.14 MG/DL (ref 0.7–1.3)
GLUCOSE BLD STRIP.AUTO-MCNC: 219 MG/DL (ref 65–100)
GLUCOSE BLD STRIP.AUTO-MCNC: 273 MG/DL (ref 65–100)
GLUCOSE SERPL-MCNC: 275 MG/DL (ref 65–100)
POTASSIUM SERPL-SCNC: 4.4 MMOL/L (ref 3.5–5.1)
SERVICE CMNT-IMP: ABNORMAL
SODIUM SERPL-SCNC: 135 MMOL/L (ref 136–145)

## 2020-05-26 PROCEDURE — 74011636637 HC RX REV CODE- 636/637: Performed by: FAMILY MEDICINE

## 2020-05-26 PROCEDURE — 74011250637 HC RX REV CODE- 250/637: Performed by: INTERNAL MEDICINE

## 2020-05-26 PROCEDURE — 80048 BASIC METABOLIC PNL TOTAL CA: CPT

## 2020-05-26 PROCEDURE — 65660000000 HC RM CCU STEPDOWN

## 2020-05-26 PROCEDURE — 74011000250 HC RX REV CODE- 250: Performed by: FAMILY MEDICINE

## 2020-05-26 PROCEDURE — 77010033678 HC OXYGEN DAILY

## 2020-05-26 PROCEDURE — 74011000258 HC RX REV CODE- 258: Performed by: FAMILY MEDICINE

## 2020-05-26 PROCEDURE — 74011250637 HC RX REV CODE- 250/637: Performed by: NURSE PRACTITIONER

## 2020-05-26 PROCEDURE — 82962 GLUCOSE BLOOD TEST: CPT

## 2020-05-26 PROCEDURE — 74011250637 HC RX REV CODE- 250/637: Performed by: FAMILY MEDICINE

## 2020-05-26 PROCEDURE — C9113 INJ PANTOPRAZOLE SODIUM, VIA: HCPCS | Performed by: HOSPITALIST

## 2020-05-26 PROCEDURE — 74011000258 HC RX REV CODE- 258: Performed by: HOSPITALIST

## 2020-05-26 PROCEDURE — 74011000250 HC RX REV CODE- 250: Performed by: HOSPITALIST

## 2020-05-26 PROCEDURE — 74011250636 HC RX REV CODE- 250/636: Performed by: FAMILY MEDICINE

## 2020-05-26 PROCEDURE — 74011250636 HC RX REV CODE- 250/636: Performed by: INTERNAL MEDICINE

## 2020-05-26 PROCEDURE — 74011250637 HC RX REV CODE- 250/637: Performed by: HOSPITALIST

## 2020-05-26 PROCEDURE — 74011250636 HC RX REV CODE- 250/636: Performed by: HOSPITALIST

## 2020-05-26 PROCEDURE — 36415 COLL VENOUS BLD VENIPUNCTURE: CPT

## 2020-05-26 RX ORDER — MAGNESIUM SULFATE 100 %
4 CRYSTALS MISCELLANEOUS AS NEEDED
Status: DISCONTINUED | OUTPATIENT
Start: 2020-05-26 | End: 2020-05-30 | Stop reason: HOSPADM

## 2020-05-26 RX ORDER — SUMATRIPTAN 25 MG/1
50 TABLET, FILM COATED ORAL
Status: COMPLETED | OUTPATIENT
Start: 2020-05-26 | End: 2020-05-26

## 2020-05-26 RX ORDER — OXYCODONE HYDROCHLORIDE 5 MG/1
5 TABLET ORAL
Status: DISCONTINUED | OUTPATIENT
Start: 2020-05-26 | End: 2020-05-30 | Stop reason: HOSPADM

## 2020-05-26 RX ORDER — INSULIN LISPRO 100 [IU]/ML
INJECTION, SOLUTION INTRAVENOUS; SUBCUTANEOUS
Status: DISCONTINUED | OUTPATIENT
Start: 2020-05-26 | End: 2020-05-30 | Stop reason: HOSPADM

## 2020-05-26 RX ORDER — MECLIZINE HCL 12.5 MG 12.5 MG/1
25 TABLET ORAL
Status: DISCONTINUED | OUTPATIENT
Start: 2020-05-26 | End: 2020-05-30 | Stop reason: HOSPADM

## 2020-05-26 RX ORDER — DEXTROSE MONOHYDRATE 100 MG/ML
0-250 INJECTION, SOLUTION INTRAVENOUS AS NEEDED
Status: DISCONTINUED | OUTPATIENT
Start: 2020-05-26 | End: 2020-05-30 | Stop reason: HOSPADM

## 2020-05-26 RX ORDER — BALSAM PERU/CASTOR OIL
OINTMENT (GRAM) TOPICAL EVERY 8 HOURS
Status: DISCONTINUED | OUTPATIENT
Start: 2020-05-26 | End: 2020-05-30 | Stop reason: HOSPADM

## 2020-05-26 RX ADMIN — PRIMIDONE 50 MG: 50 TABLET ORAL at 16:17

## 2020-05-26 RX ADMIN — MECLIZINE 25 MG: 12.5 TABLET ORAL at 14:31

## 2020-05-26 RX ADMIN — PRIMIDONE 50 MG: 50 TABLET ORAL at 21:11

## 2020-05-26 RX ADMIN — ONDANSETRON HYDROCHLORIDE 4 MG: 2 INJECTION, SOLUTION INTRAMUSCULAR; INTRAVENOUS at 10:25

## 2020-05-26 RX ADMIN — CASTOR OIL AND BALSAM, PERU: 788; 87 OINTMENT TOPICAL at 22:09

## 2020-05-26 RX ADMIN — PRIMIDONE 50 MG: 50 TABLET ORAL at 08:17

## 2020-05-26 RX ADMIN — ENOXAPARIN SODIUM 40 MG: 40 INJECTION SUBCUTANEOUS at 08:17

## 2020-05-26 RX ADMIN — ONDANSETRON HYDROCHLORIDE 4 MG: 2 INJECTION, SOLUTION INTRAMUSCULAR; INTRAVENOUS at 19:19

## 2020-05-26 RX ADMIN — INSULIN LISPRO 3 UNITS: 100 INJECTION, SOLUTION INTRAVENOUS; SUBCUTANEOUS at 17:43

## 2020-05-26 RX ADMIN — CEFTRIAXONE SODIUM 1 G: 1 INJECTION, POWDER, FOR SOLUTION INTRAMUSCULAR; INTRAVENOUS at 10:30

## 2020-05-26 RX ADMIN — MORPHINE SULFATE 2 MG: 2 INJECTION, SOLUTION INTRAMUSCULAR; INTRAVENOUS at 01:02

## 2020-05-26 RX ADMIN — SODIUM CHLORIDE 10 MG: 9 INJECTION INTRAMUSCULAR; INTRAVENOUS; SUBCUTANEOUS at 14:31

## 2020-05-26 RX ADMIN — MORPHINE SULFATE 2 MG: 2 INJECTION, SOLUTION INTRAMUSCULAR; INTRAVENOUS at 21:15

## 2020-05-26 RX ADMIN — SODIUM CHLORIDE 40 MG: 9 INJECTION INTRAMUSCULAR; INTRAVENOUS; SUBCUTANEOUS at 08:17

## 2020-05-26 RX ADMIN — DOXYCYCLINE 100 MG: 100 INJECTION, POWDER, LYOPHILIZED, FOR SOLUTION INTRAVENOUS at 22:08

## 2020-05-26 RX ADMIN — SUMATRIPTAN SUCCINATE 50 MG: 25 TABLET ORAL at 03:09

## 2020-05-26 RX ADMIN — METOPROLOL TARTRATE 12.5 MG: 25 TABLET, FILM COATED ORAL at 21:12

## 2020-05-26 RX ADMIN — Medication 10 ML: at 22:11

## 2020-05-26 RX ADMIN — AZITHROMYCIN MONOHYDRATE 250 MG: 250 TABLET ORAL at 08:17

## 2020-05-26 RX ADMIN — INSULIN LISPRO 3 UNITS: 100 INJECTION, SOLUTION INTRAVENOUS; SUBCUTANEOUS at 22:45

## 2020-05-26 RX ADMIN — Medication 1 CAPSULE: at 08:17

## 2020-05-26 RX ADMIN — CASTOR OIL AND BALSAM, PERU: 788; 87 OINTMENT TOPICAL at 14:00

## 2020-05-26 RX ADMIN — METOPROLOL TARTRATE 12.5 MG: 25 TABLET, FILM COATED ORAL at 08:17

## 2020-05-26 RX ADMIN — ASPIRIN 81 MG 81 MG: 81 TABLET ORAL at 08:17

## 2020-05-26 RX ADMIN — MECLIZINE 25 MG: 12.5 TABLET ORAL at 16:17

## 2020-05-26 NOTE — PROGRESS NOTES
TRANSFER - OUT REPORT:    Verbal report given to Gera Cotton RN(name) on [employer]  being transferred to [facility](unit) for routine progression of care       Report consisted of patients Situation, Background, Assessment and   Recommendations(SBAR). Information from the following report(s) SBAR, Kardex, Intake/Output, MAR, Recent Results, Cardiac Rhythm NSR and Quality Measures was reviewed with the receiving nurse. Lines:   Peripheral IV 05/25/20 Posterior;Right Hand (Active)   Site Assessment Clean, dry, & intact 5/25/2020  9:29 PM   Phlebitis Assessment 0 5/25/2020  9:29 PM   Dressing Status Clean, dry, & intact 5/25/2020  9:29 PM   Dressing Type Tape;Transparent 5/25/2020  9:29 PM   Hub Color/Line Status Flushed;Green;Capped 5/25/2020  9:29 PM   Alcohol Cap Used Yes 5/25/2020  9:29 PM        Opportunity for questions and clarification was provided.       Patient transported with:   Monitor  O2 @ 2 liters  Registered Nurse  [facility]

## 2020-05-26 NOTE — PROGRESS NOTES
Bedside and Verbal shift change report given to HARRIETT Rodríguez (oncoming nurse) by Hair Paul RN (offgoing nurse). Report included the following information SBAR, Kardex, Intake/Output and MAR.

## 2020-05-26 NOTE — CDMP QUERY
The admitting diagnosis in the progress notes for this patient indicates unspecified chest pain.   Please document in the progress notes and/or D/C Summary the etiology of the Chest Pain if known, please clarify the underlying cause:  
 
 ?  GERD ?  Costochondritis ?  Pleuritic ?  Psychogenic causes ?  Stress and /or anxiety ?  Other Cause ?  Chest pain, cause undetermined Risk Factors: 76 y/o male admitted with CC of Chest pain and Acute Encephalopathy. Hx of DM, CVA, Cirrhosis, IBS Clinical Indicators: Cardiac Markers WNL, EKG: Possible Lateral infarct , age undetermined, CXR: There is slight increasing bibasilar haziness/atelectasis which may represent an element of interstitial 
edema or possibly atypical pneumonia Treatment: ASA, Morphine, Protonix IV, Lopressor, Cardiology Consult, Thank you, Nestor Tee RN [address] [place] [address] [phone]

## 2020-05-26 NOTE — PROGRESS NOTES
Primary Nurse Zenia Mo and Angelina RN performed a dual skin assessment on this patient No impairment noted  Murphy score is 15

## 2020-05-26 NOTE — PROGRESS NOTES
Patient complains of headache and insists that he wants to see the doctor immediately.   Paged the hospitalist and he said he is coming to see the patient

## 2020-05-26 NOTE — WOUND CARE
Wound Care Note:  
 
New consult placed by isrrael request for sacrum Chart shows: 
Admitted for chest pain and altered mental status Past Medical History:  
Diagnosis Date  Diabetes ([address]  Gastrointestinal disorder IBS  Hypertension  Psychiatric disorder   
 depression, anxiety  Sleep disorder DENG, CPAP @ night  Stroke [facility]) 7105 L sided deficit WBC = 6.3 on 5/24/20 Assessment:  
Patient is confused, talking, incontinent with moderate assistance needed in repositioning. Bed: [facility] Diet: Regular Patient reports no pain; nausea. Bilateral heels and buttocks skin intact and without erythema. 1. POA sacrum with blanchable erythema, no open area. Venelex ointment to be ordered. Spoke with Dr. Arcadio Rivera, wound care orders obtained. Patient repositioned on right side. Heels offloaded on pillows. Recommendations:   
Sacrum and bilateral heels- Every 8 hours liberally apply Venelex ointment. Skin Care & Pressure Prevention: 
Minimize layers of linen/pads under patient to optimize support surface. Turn/reposition approximately every 2 hours and offload heels. Manage incontinence / promote continence Nourishing Skin Cream to dry skin, minimize use of briefs when able Discussed above plan with patient & HARRIETT Rodríguez Transition of Care: Plan to follow as needed while admitted to hospital. 
 
JYOTI Juarez, RN, [facility], [facility]. 
office [phone] 
pager [phone] or call  to page

## 2020-05-26 NOTE — PROGRESS NOTES
Sendy NP Progress note    Name: Moisés Rodriguez  YOB: 1945  MRN: [id number]  Admission Date: 5/24/2020  1:33 AM    Date of Service: 5/26/2020 2:41 AM                                Overnight Update:        Complaint: Requesting medication for headache  Paged by: Dusty Benito RN  Subjective: Patient complaint of headache x1 hour. Past medical history includes postconcussive headaches for patient. This headache is similar to his typical presentation. Takes sumatriptan at home. Objective: Alert and oriented x4. In no apparent distress. PERRLA EOMI. .  Plan: We will give sumatriptan 50 mg once now.      Carolina Nam, MSN, RN, NP-C  [phone] or via [employer]

## 2020-05-26 NOTE — CONSULTS
Consulted for left UE tremor. Pt has h/o CVA and reports having this tremor since then, so 27 years. On exam, tremor only manifested when we were discussing it, could easily be stopped by distraction, or examiner touching his arm. Non-physiological tremor. D/c primidone.

## 2020-05-26 NOTE — PROGRESS NOTES
[facility]  Hospitalist Group                                                                                          Hospitalist Progress Note  Adeline Lopez MD  Answering service: [phone] OR [phone] from in house phone        Date of Service:  2020  NAME:  Enoc Dunlap  :  1945  MRN:  [id number]      Admission Summary:   75year old male with past medical history DENG, diabetes, CVA with left sided deficits presenting to [facility] via EMS with chest pain that started at 2200 and given aspirin 324 mg. Patient poor historian and history obtained at 5 am. Currently, patient difficult to arouse but does state that his chest pain is centralized. Unable to give further details including pain scale, radiation, descriptive features. Per ED physician, patient endorsed nonproductive cough, nausea without vomiting. Denied fevers, chills. In the ED, chest x-ray with slight increasing bibasilar haziness/atelectasis, may represent interstitial edema or possibly atypical pneumonia. EKG without ST changes. Troponin wnl, D-dimer 0.7. Hospitalist consulted for further evaluation/admission.      Interval history / Subjective:   Seen and examined patient at bedside patient was not wearing his oxygen and saturation was low on the monitor in 85-90 placed back on 2 L nasal cannula it went up to 90+ patient has no symptoms patient seems to be little confused did not voice any acute issues but telling me chest hurts  He was alert he was awake he could discontinue to talk with me but it seems to be he is little confused or demented     SIRS- COV 2 test negative from 524  Assessment & Plan:     Chest pain  -No ischemic changes on EKG, normal troponin  -Cardiology evaluated the patient, no further work-up planned  -Question pain from noncardiac origin  -Check CTA of the chest for further evaluation  -chest pain cause undertermined     Acute encephalopathy  -Acute metabolic encephalopathy likely from infectious process  -Continue monitor  -Possible baseline dementia    Urinary tract infection  -Urine culture grew Staphylococcus simulans  -Change antibiotic to doxycycline based on sensitivity profile     Interstitial edema  -COVID-19 test negative  -No prior history of congestive heart failure  -Patient currently on 2 L of oxygen  -Check echocardiogram    Diabetes  -Diabetes with hyperglycemia  -Not on home medications  -Check hemoglobin A1c, start sliding scale insulin coverage    History of CVA  -Continue aspirin      Hypothyroidism  -TSH  -We will need to resume Synthroid once pharmacy confirmed the dose    Depression  -Resume Effexor 150 mg daily     Code status: Full  DVT prophylaxis: Lovenox    Care Plan discussed with: Patient/Family and Nurse  Anticipated Disposition: Home w/Family  Anticipated Discharge: 24 hours to 48 hours     Hospital Problems  Date Reviewed: 3/6/2019          Codes Class Noted POA    AMS (altered mental status) ICD-10-CM: R41.82  ICD-9-CM: 780.97  5/25/2020 Unknown        Chest pain ICD-10-CM: R07.9  ICD-9-CM: 786.50  5/24/2020 Unknown                Review of Systems:   Review of systems not obtained due to patient factors. Vital Signs:    Last 24hrs VS reviewed since prior progress note. Most recent are:  Visit Vitals  /72 (BP 1 Location: Left arm, BP Patient Position: At rest)   Pulse 88   Temp 98.1 °F (36.7 °C)   Resp 20   Ht 5' 7\" (1.702 m)   Wt 104.3 kg (229 lb 15 oz)   SpO2 93%   BMI 36.01 kg/m²         Intake/Output Summary (Last 24 hours) at 5/26/2020 1553  Last data filed at 5/26/2020 0589  Gross per 24 hour   Intake 50 ml   Output 425 ml   Net -375 ml        Physical Examination:             Constitutional:  No acute distress   ENT:  Oral mucosa moist, oropharynx benign. Resp:  CTA bilaterally. No wheezing/rhonchi/rales. No accessory muscle use   CV:  Regular rhythm, normal rate, no murmurs, gallops, rubs    GI:  Soft, non distended, non tender. normoactive bowel sounds, no hepatosplenomegaly     Musculoskeletal:  No edema, warm, 2+ pulses throughout    Neurologic:  Moves all extremities. Awake but somewhat drowsy     Psych:  Good insight, Not anxious nor agitated. Data Review:    I personally reviewed  Image and labs      Labs:     Recent Labs     05/24/20  0149   WBC 6.3   HGB 11.3*   HCT 37.7   *     Recent Labs     05/26/20  1355 05/24/20  0149   * 137   K 4.4 3.7    109*   CO2 23 20*   BUN 13 16   CREA 1.14 1.23   * 189*   CA 8.6 8.9   MG  --  2.1     Recent Labs     05/24/20  0149   SGOT 57*   ALT 48   *   TBILI 0.2   TP 6.9   ALB 2.6*   GLOB 4.3*     No results for input(s): INR, PTP, APTT, INREXT, INREXT in the last 72 hours. No results for input(s): FE, TIBC, PSAT, FERR in the last 72 hours. No results found for: FOL, RBCF   No results for input(s): PH, PCO2, PO2 in the last 72 hours.   Recent Labs     05/24/20  1309 05/24/20  0858 05/24/20 0149   TROIQ <0.05 <0.05 <0.05     Lab Results   Component Value Date/Time    Cholesterol, total 173 05/24/2020 08:56 AM    HDL Cholesterol 43 05/24/2020 08:56 AM    LDL, calculated 99.4 05/24/2020 08:56 AM    Triglyceride 153 (H) 05/24/2020 08:56 AM    CHOL/HDL Ratio 4.0 05/24/2020 08:56 AM     Lab Results   Component Value Date/Time    Glucose (POC) 100 03/05/2019 10:43 PM     Lab Results   Component Value Date/Time    Color YELLOW/STRAW 05/24/2020 08:56 AM    Appearance TURBID (A) 05/24/2020 08:56 AM    Specific gravity 1.020 05/24/2020 08:56 AM    pH (UA) 7.0 05/24/2020 08:56 AM    Protein Negative 05/24/2020 08:56 AM    Glucose Negative 05/24/2020 08:56 AM    Ketone Negative 05/24/2020 08:56 AM    Bilirubin Negative 05/24/2020 08:56 AM    Urobilinogen 1.0 05/24/2020 08:56 AM    Nitrites Negative 05/24/2020 08:56 AM    Leukocyte Esterase SMALL (A) 05/24/2020 08:56 AM    Epithelial cells MODERATE (A) 05/24/2020 08:56 AM    Bacteria 4+ (A) 05/24/2020 08:56 AM    WBC 10-20 05/24/2020 08:56 AM    RBC 0-5 05/24/2020 08:56 AM         Medications Reviewed:     Current Facility-Administered Medications   Medication Dose Route Frequency    balsam peru-castor oiL (VENELEX) ointment   Topical Q8H    meclizine (ANTIVERT) tablet 25 mg  25 mg Oral Q6H PRN    prochlorperazine (COMPAZINE) with saline injection 10 mg  10 mg IntraVENous Q6H PRN    cefTRIAXone (ROCEPHIN) 1 g in 0.9% sodium chloride (MBP/ADV) 50 mL  1 g IntraVENous Q24H    lactobac ac& pc-s.therm-b.anim (ANGELIQUE Q/RISAQUAD)  1 Cap Oral DAILY    hydrALAZINE (APRESOLINE) 20 mg/mL injection 10 mg  10 mg IntraVENous Q6H PRN    metoprolol tartrate (LOPRESSOR) tablet 12.5 mg  12.5 mg Oral BID    primidone (MYSOLINE) tablet 50 mg  50 mg Oral TID    sodium chloride (NS) flush 5-40 mL  5-40 mL IntraVENous Q8H    sodium chloride (NS) flush 5-40 mL  5-40 mL IntraVENous PRN    acetaminophen (TYLENOL) tablet 650 mg  650 mg Oral Q4H PRN    naloxone (NARCAN) injection 0.4 mg  0.4 mg IntraVENous PRN    diphenhydrAMINE (BENADRYL) injection 12.5 mg  12.5 mg IntraVENous Q4H PRN    ondansetron (ZOFRAN) injection 4 mg  4 mg IntraVENous Q4H PRN    bisacodyL (DULCOLAX) tablet 5 mg  5 mg Oral DAILY PRN    enoxaparin (LOVENOX) injection 40 mg  40 mg SubCUTAneous Q24H    aspirin chewable tablet 81 mg  81 mg Oral DAILY    azithromycin (ZITHROMAX) tablet 250 mg  250 mg Oral DAILY    morphine injection 2 mg  2 mg IntraVENous Q4H PRN    labetaloL (NORMODYNE;TRANDATE) injection 10 mg  10 mg IntraVENous Q4H PRN    pantoprazole (PROTONIX) 40 mg in 0.9% sodium chloride 10 mL injection  40 mg IntraVENous DAILY     ______________________________________________________________________  EXPECTED LENGTH OF STAY: 1d 16h  ACTUAL LENGTH OF STAY:          1                 Dinorah Shah MD

## 2020-05-26 NOTE — PROGRESS NOTES
Bedside and Verbal shift change report given to Benny Galeas RN (oncoming nurse) by Britta Vilchis RN (offgoing nurse). Report included the following information SBAR, Kardex, Intake/Output, MAR and Recent Results.

## 2020-05-27 ENCOUNTER — APPOINTMENT (OUTPATIENT)
Dept: NON INVASIVE DIAGNOSTICS | Age: 75
DRG: 313 | End: 2020-05-27
Attending: HOSPITALIST
Payer: MEDICARE

## 2020-05-27 LAB
ANION GAP SERPL CALC-SCNC: 5 MMOL/L (ref 5–15)
AV VELOCITY RATIO: 0.48
AV VTI RATIO: 0.5
BUN SERPL-MCNC: 12 MG/DL (ref 6–20)
BUN/CREAT SERPL: 12 (ref 12–20)
CALCIUM SERPL-MCNC: 8.4 MG/DL (ref 8.5–10.1)
CHLORIDE SERPL-SCNC: 106 MMOL/L (ref 97–108)
CO2 SERPL-SCNC: 24 MMOL/L (ref 21–32)
CREAT SERPL-MCNC: 1.01 MG/DL (ref 0.7–1.3)
ECHO AV AREA PEAK VELOCITY: 1.4 CM2
ECHO AV AREA VTI: 1.3 CM2
ECHO AV MEAN GRADIENT: 10.1 MMHG
ECHO AV MEAN VELOCITY: 1.54 M/S
ECHO AV PEAK GRADIENT: 16.9 MMHG
ECHO AV PEAK VELOCITY: 205.81 CM/S
ECHO AV VTI: 46.42 CM
ECHO LA MAJOR AXIS: 2.87 CM
ECHO LV E' LATERAL VELOCITY: 6.25 CM/S
ECHO LV E' SEPTAL VELOCITY: 4.39 CM/S
ECHO LV INTERNAL DIMENSION DIASTOLIC: 3.18 CM (ref 4.2–5.9)
ECHO LV INTERNAL DIMENSION SYSTOLIC: 1.82 CM
ECHO LV IVSD: 0.77 CM (ref 0.6–1)
ECHO LV MASS 2D: 66.7 G (ref 88–224)
ECHO LV MASS INDEX 2D: 31.1 G/M2 (ref 49–115)
ECHO LV POSTERIOR WALL DIASTOLIC: 0.83 CM (ref 0.6–1)
ECHO LVOT DIAM: 1.9 CM
ECHO LVOT PEAK GRADIENT: 4 MMHG
ECHO LVOT PEAK VELOCITY: 99.64 CM/S
ECHO LVOT SV: 61.2 ML
ECHO LVOT VTI: 21.63 CM
ECHO MV A VELOCITY: 92.15 CM/S
ECHO MV AREA PHT: 3.3 CM2
ECHO MV E DECELERATION TIME (DT): 233.3 MS
ECHO MV E VELOCITY: 41.56 CM/S
ECHO MV E/A RATIO: 0.45
ECHO MV E/E' LATERAL: 6.65
ECHO MV E/E' RATIO (AVERAGED): 8.06
ECHO MV E/E' SEPTAL: 9.47
ECHO MV PRESSURE HALF TIME (PHT): 67.6 MS
ECHO PV MAX VELOCITY: 91.07 CM/S
ECHO PV PEAK GRADIENT: 3.3 MMHG
ECHO RV TAPSE: 1.57 CM (ref 1.5–2)
EST. AVERAGE GLUCOSE BLD GHB EST-MCNC: 166 MG/DL
GLUCOSE BLD STRIP.AUTO-MCNC: 166 MG/DL (ref 65–100)
GLUCOSE BLD STRIP.AUTO-MCNC: 205 MG/DL (ref 65–100)
GLUCOSE BLD STRIP.AUTO-MCNC: 254 MG/DL (ref 65–100)
GLUCOSE BLD STRIP.AUTO-MCNC: 281 MG/DL (ref 65–100)
GLUCOSE SERPL-MCNC: 186 MG/DL (ref 65–100)
HBA1C MFR BLD: 7.4 % (ref 4–5.6)
LVFS 2D: 42.75 %
LVOT MG: 1.74 MMHG
LVOT MV: 0.61 CM/S
MV DEC SLOPE: 1.78
POTASSIUM SERPL-SCNC: 3.7 MMOL/L (ref 3.5–5.1)
SERVICE CMNT-IMP: ABNORMAL
SODIUM SERPL-SCNC: 135 MMOL/L (ref 136–145)

## 2020-05-27 PROCEDURE — 74011250637 HC RX REV CODE- 250/637: Performed by: INTERNAL MEDICINE

## 2020-05-27 PROCEDURE — C9113 INJ PANTOPRAZOLE SODIUM, VIA: HCPCS | Performed by: HOSPITALIST

## 2020-05-27 PROCEDURE — 36415 COLL VENOUS BLD VENIPUNCTURE: CPT

## 2020-05-27 PROCEDURE — 97161 PT EVAL LOW COMPLEX 20 MIN: CPT

## 2020-05-27 PROCEDURE — 93306 TTE W/DOPPLER COMPLETE: CPT

## 2020-05-27 PROCEDURE — 82962 GLUCOSE BLOOD TEST: CPT

## 2020-05-27 PROCEDURE — 74011636637 HC RX REV CODE- 636/637: Performed by: INTERNAL MEDICINE

## 2020-05-27 PROCEDURE — 74011000250 HC RX REV CODE- 250: Performed by: HOSPITALIST

## 2020-05-27 PROCEDURE — 74011250636 HC RX REV CODE- 250/636: Performed by: INTERNAL MEDICINE

## 2020-05-27 PROCEDURE — 74011250637 HC RX REV CODE- 250/637: Performed by: HOSPITALIST

## 2020-05-27 PROCEDURE — 65270000029 HC RM PRIVATE

## 2020-05-27 PROCEDURE — 74011250636 HC RX REV CODE- 250/636: Performed by: HOSPITALIST

## 2020-05-27 PROCEDURE — 80048 BASIC METABOLIC PNL TOTAL CA: CPT

## 2020-05-27 PROCEDURE — 74011250637 HC RX REV CODE- 250/637: Performed by: NURSE PRACTITIONER

## 2020-05-27 PROCEDURE — 97116 GAIT TRAINING THERAPY: CPT

## 2020-05-27 PROCEDURE — 97530 THERAPEUTIC ACTIVITIES: CPT

## 2020-05-27 PROCEDURE — 83036 HEMOGLOBIN GLYCOSYLATED A1C: CPT

## 2020-05-27 PROCEDURE — 74011636637 HC RX REV CODE- 636/637: Performed by: FAMILY MEDICINE

## 2020-05-27 RX ORDER — DOXYCYCLINE HYCLATE 100 MG
100 TABLET ORAL EVERY 12 HOURS
Status: DISCONTINUED | OUTPATIENT
Start: 2020-05-27 | End: 2020-05-29

## 2020-05-27 RX ORDER — ONDANSETRON 4 MG/1
4 TABLET, ORALLY DISINTEGRATING ORAL
Status: DISCONTINUED | OUTPATIENT
Start: 2020-05-27 | End: 2020-05-30 | Stop reason: HOSPADM

## 2020-05-27 RX ORDER — INSULIN GLARGINE 100 [IU]/ML
10 INJECTION, SOLUTION SUBCUTANEOUS DAILY
Status: DISCONTINUED | OUTPATIENT
Start: 2020-05-27 | End: 2020-05-30 | Stop reason: HOSPADM

## 2020-05-27 RX ADMIN — ASPIRIN 81 MG 81 MG: 81 TABLET ORAL at 09:47

## 2020-05-27 RX ADMIN — PRIMIDONE 50 MG: 50 TABLET ORAL at 09:46

## 2020-05-27 RX ADMIN — ENOXAPARIN SODIUM 40 MG: 40 INJECTION SUBCUTANEOUS at 11:13

## 2020-05-27 RX ADMIN — INSULIN GLARGINE 10 UNITS: 100 INJECTION, SOLUTION SUBCUTANEOUS at 13:26

## 2020-05-27 RX ADMIN — METOPROLOL TARTRATE 12.5 MG: 25 TABLET, FILM COATED ORAL at 09:48

## 2020-05-27 RX ADMIN — Medication 10 ML: at 21:44

## 2020-05-27 RX ADMIN — CASTOR OIL AND BALSAM, PERU: 788; 87 OINTMENT TOPICAL at 15:05

## 2020-05-27 RX ADMIN — INSULIN LISPRO 2 UNITS: 100 INJECTION, SOLUTION INTRAVENOUS; SUBCUTANEOUS at 06:45

## 2020-05-27 RX ADMIN — DOXYCYCLINE HYCLATE 100 MG: 100 TABLET, COATED ORAL at 21:43

## 2020-05-27 RX ADMIN — Medication 10 ML: at 15:05

## 2020-05-27 RX ADMIN — ACETAMINOPHEN 650 MG: 325 TABLET ORAL at 12:09

## 2020-05-27 RX ADMIN — CASTOR OIL AND BALSAM, PERU: 788; 87 OINTMENT TOPICAL at 05:59

## 2020-05-27 RX ADMIN — METOPROLOL TARTRATE 12.5 MG: 25 TABLET, FILM COATED ORAL at 21:43

## 2020-05-27 RX ADMIN — OXYCODONE 5 MG: 5 TABLET ORAL at 14:35

## 2020-05-27 RX ADMIN — CASTOR OIL AND BALSAM, PERU: 788; 87 OINTMENT TOPICAL at 21:42

## 2020-05-27 RX ADMIN — PRIMIDONE 50 MG: 50 TABLET ORAL at 16:56

## 2020-05-27 RX ADMIN — INSULIN LISPRO 5 UNITS: 100 INJECTION, SOLUTION INTRAVENOUS; SUBCUTANEOUS at 12:11

## 2020-05-27 RX ADMIN — DOXYCYCLINE HYCLATE 100 MG: 100 TABLET, COATED ORAL at 09:46

## 2020-05-27 RX ADMIN — Medication 10 ML: at 05:57

## 2020-05-27 RX ADMIN — ONDANSETRON 4 MG: 4 TABLET, ORALLY DISINTEGRATING ORAL at 13:24

## 2020-05-27 RX ADMIN — Medication 1 CAPSULE: at 09:46

## 2020-05-27 RX ADMIN — INSULIN LISPRO 2 UNITS: 100 INJECTION, SOLUTION INTRAVENOUS; SUBCUTANEOUS at 21:43

## 2020-05-27 RX ADMIN — SODIUM CHLORIDE 40 MG: 9 INJECTION INTRAMUSCULAR; INTRAVENOUS; SUBCUTANEOUS at 09:59

## 2020-05-27 RX ADMIN — MORPHINE SULFATE 2 MG: 2 INJECTION, SOLUTION INTRAMUSCULAR; INTRAVENOUS at 15:51

## 2020-05-27 RX ADMIN — ONDANSETRON HYDROCHLORIDE 4 MG: 2 INJECTION, SOLUTION INTRAMUSCULAR; INTRAVENOUS at 00:56

## 2020-05-27 RX ADMIN — INSULIN LISPRO 5 UNITS: 100 INJECTION, SOLUTION INTRAVENOUS; SUBCUTANEOUS at 16:59

## 2020-05-27 RX ADMIN — PRIMIDONE 50 MG: 50 TABLET ORAL at 21:42

## 2020-05-27 NOTE — PROGRESS NOTES
STEVEN: Patient lives at home alone and has caregivers daily from 7 AM-3 PM who assist with ADLs. Noted negative COVID testing 5/24. Waiting on daughter to call back to discuss rehab/SNF placement. Patient needs to be set-up with a PCP prior to discharge. Care Management Interventions  PCP Verified by CM: No(patient does not have a PCP)  Mode of Transport at Discharge: Other (see comment)(TBD will likely need transport)  Transition of Care Consult (CM Consult): Discharge Planning  MyChart Signup: No  Discharge Durable Medical Equipment: (patient owns electric scooter and walker)  Physical Therapy Consult: Yes  Occupational Therapy Consult: Yes  Speech Therapy Consult: No  Current Support Network: Has Personal Caregivers, Lives Alone  Confirm Follow Up Transport: Other (see comment)  Discharge Location  Discharge Placement: Other:(TBD, likely rehab/SNF)     Reason for Admission:   Chest pain                   RUR Score:    15%                 Plan for utilizing home health:  Patient will likely need rehab. PCP: First and Last name:  Patient does not have a PCP   Name of Practice:    Are you a current patient: Yes/No:    Approximate date of last visit:                     Current Advanced Directive/Advance Care Plan: Full code. AMD not on file. Transition of Care Plan:    TBD, likely rehab/SNF. Chart reviewed. CM met with patient at bedside. Patient lives alone in a one story home and has caregivers daily from 7 AM-3 PM through medicaid who assist with ADLs. Patient uses an electric wheelchair and walker at home. Patient does not use home 02. Patient stated he does not have a PCP. Patient is interested in securing a PCP with [employer]. CM provided [employer] PCP list to tarsha and will ask care management specialist to follow-up to schedule new PCP apt. Patient gets prescriptions filled at [facility]. The patient's caregiver picks up medications for him. Patient has a son Syl Mckee #[phone] and a daughter Jin Hall #[phone] who lives in [place]. Patient gave permission for CM to speak with daughter. Patient would like to daughter to assist in choosing a rehab facility. CM called daughter Jin Hall [phone] and left message. CM will continue to follow.     PREETI Turk/CRM

## 2020-05-27 NOTE — PROGRESS NOTES
13:15- MD said that patient did need a new IV line. 13:30- No one available from CCU to help with IV.    13:32- No one available from [facility] right now to help with IV.    13:34- No one from CVICU or ICU to help with IV.    13:35- Member from IV team to come help with IV.    14:05- New IV line in patient.

## 2020-05-27 NOTE — PROGRESS NOTES
Bedside and Verbal shift change report given to Shahab Ferris (oncoming nurse) by Joana Mayer (offgoing nurse). Report included the following information SBAR and Kardex.

## 2020-05-27 NOTE — PROGRESS NOTES
Pt c/o IV site burning. Tried 3x to restart. Received I/2 vibramycin. NP called for po pain med. Pt refused to be stuck again. NP aware.

## 2020-05-27 NOTE — PROGRESS NOTES
Problem: Mobility Impaired (Adult and Pediatric)  Goal: *Acute Goals and Plan of Care (Insert Text)  Description: FUNCTIONAL STATUS PRIOR TO ADMISSION: The patient was functional at the wheelchair level and required contact guard assistance for transfers to the chair. HOME SUPPORT PRIOR TO ADMISSION: The patient lived alone with paid caregiver [address] to provide assistance. Lives in Senior Apartment Complex, no stairs. Daughter lives in [place].    Physical Therapy Goals  Initiated 5/27/2020  1. Patient will move from supine to sit and sit to supine , scoot up and down and roll side to side in bed with modified independence within 7 day(s). 2.  Patient will transfer from bed to chair and chair to bed with modified independence using the least restrictive device within 7 day(s). 3.  Patient will perform sit to stand with modified independence within 7 day(s). 4.  Patient will ambulate with contact guard assist for 50 feet with the least restrictive device within 7 day(s). Outcome: Progressing Towards Goal   PHYSICAL THERAPY EVALUATION  Patient: Marcus Thompson [de-identified]75 y.o. male)  Date: 5/27/2020  Primary Diagnosis: Chest pain [R07.9]  AMS (altered mental status) [R41.82]        Precautions:   Fall      ASSESSMENT  Based on the objective data described below, the patient presents with  impairment in functional mobility, activity tolerance and balance s/p fall /CP/AMS. Hx of CVA ( L Toi). The patient was modified functional at the wheelchair level and required contact guard assistance for transfers to the chair. The patient lived alone with paid caregiver [address] to provide assistance. Lives in Senior Apartment Complex, no stairs. Daughter lives in [place]. States that he stays in bed in the mornings & that his aide assists with transfers, bathing, dressing, meal prep. Patient states that Yoko Diaz (his aide) has been out sick a  lot lately\" (his aide).  Is very focused on his aches and pains, especially L ribcage pain & L foot pain. Current Level of Function Impacting Discharge (mobility/balance): Performed mobility with contact guard-minimal assistance, cues & encouragement. Ambulated with RW & gait belt, but only 15 ft (due to complaints of pain). Functional Outcome Measure: The patient scored 35/100 on the Barthel outcome measure which is indicative of maximal impaired ability to care for basic self-needs/dependency on others. Other factors to consider for discharge: Lives alone/confused/limited family support/4 recent falls in his home     Patient will benefit from skilled therapy intervention to address the above noted impairments. PLAN :  Recommendations and Planned Interventions: bed mobility training, transfer training, gait training, therapeutic exercises, patient and family training/education, and therapeutic activities      Frequency/Duration: Patient will be followed by physical therapy:  5 times a week to address goals. Recommendation for discharge: (in order for the patient to meet his/her long term goals)  Therapy up to 5 days/week in SNF setting    This discharge recommendation:  Has been made in collaboration with the attending provider and/or case management    IF patient discharges home will need the following DME: to be determined (TBD)         SUBJECTIVE:   Patient stated I am in so much pain.     OBJECTIVE DATA SUMMARY:   HISTORY:    Past Medical History:   Diagnosis Date    Diabetes ([facility] [address]     Gastrointestinal disorder     IBS    Hypertension     Psychiatric disorder     depression, anxiety    Sleep disorder     DENG, CPAP @ night    Stroke ([facility] [address] 1993    L sided deficit     Past Surgical History:   Procedure Laterality Date    HX CHOLECYSTECTOMY      HX KNEE REPLACEMENT      Pt stated    NEUROLOGICAL PROCEDURE UNLISTED         Personal factors and/or comorbidities impacting plan of care: Lives alone/confused/limited family support/4 recent falls in his home       [address] [facility] Environment: Apartment  # Steps to Enter: 0  Rails to Enter: No  One/Two Story Residence: One story  Living Alone: Yes  Support Systems: Other (comments)(Paid CG/M- 7-3)  Patient Expects to be Discharged to[de-identified] Skilled nursing facility(for rehab)  Current DME Used/Available at Home: Wheelchair, power, Walker, rolling    EXAMINATION/PRESENTATION/DECISION MAKING:   Critical Behavior:  Neurologic State: Alert, Confused  Orientation Level: Oriented to person, Oriented to place, Disoriented to situation, Disoriented to time  Cognition: Follows commands, Appropriate for age attention/concentration, Impaired decision making, Poor safety awareness     Hearing: Auditory  Auditory Impairment: None    Range Of Motion:  AROM: Generally decreased, functional           PROM: Generally decreased, functional           Strength:    Strength: Generally decreased, functional                    Tone & Sensation:   Tone: Normal              Sensation: Impaired               Coordination:  Coordination: Generally decreased, functional  Vision:      Functional Mobility:  Bed Mobility:  Rolling: Contact guard assistance  Supine to Sit: Contact guard assistance  Sit to Supine: Contact guard assistance  Scooting: Contact guard assistance  Transfers:  Sit to Stand: Contact guard assistance  Stand to Sit: Contact guard assistance                       Balance:   Sitting: Intact  Standing: Impaired  Standing - Static: Good;Constant support  Standing - Dynamic : Fair;Constant support  Ambulation/Gait Training:  Distance (ft): 15 Feet (ft)  Assistive Device: Walker, rolling;Gait belt  Ambulation - Level of Assistance: Contact guard assistance;Minimal assistance(cues for technique)        Gait Abnormalities: Antalgic; Step to gait;Trunk sway increased     Left Side Weight Bearing: Partial (%)(due to pain)  Base of Support: Widened;Shift to right  Stance: Left decreased  Speed/Nicolle: Slow  Step Length: Right shortened  Swing Pattern: Left asymmetrical            Functional Measure:  Barthel Index:    Bathin  Bladder: 5  Bowels: 5  Groomin  Dressin  Feeding: 10  Mobility: 0  Stairs: 0  Toilet Use: 5  Transfer (Bed to Chair and Back): 10  Total: 35/100       The Barthel ADL Index: Guidelines  1. The index should be used as a record of what a patient does, not as a record of what a patient could do. 2. The main aim is to establish degree of independence from any help, physical or verbal, however minor and for whatever reason. 3. The need for supervision renders the patient not independent. 4. A patient's performance should be established using the best available evidence. Asking the patient, friends/relatives and nurses are the usual sources, but direct observation and common sense are also important. However direct testing is not needed. 5. Usually the patient's performance over the preceding 24-48 hours is important, but occasionally longer periods will be relevant. 6. Middle categories imply that the patient supplies over 50 per cent of the effort. 7. Use of aids to be independent is allowed. Jenny Bonilla., Barthel, D.W. (). Functional evaluation: the Barthel Index. [address] [facility] [employer] (14)2. Prosper Joseph dee dee ISIDRO Woo, Lindsey Feliciano., Valery Cruz., [place], [address] [place] [address] (). Measuring the change indisability after inpatient rehabilitation; comparison of the responsiveness of the Barthel Index and Functional [place] Measure. Journal of Neurology, Neurosurgery, and Psychiatry, 66(4), 672-602. Ranjith Pascual, [place].DAVID, LENORA Espino, & Alana Cheung M.A. (2004.) Assessment of post-stroke quality of life in cost-effectiveness studies: The usefulness of the Barthel Index and the EuroQoL-5D.  Quality of Life Research, 15, 074-25           Physical Therapy Evaluation Charge Determination   History Examination Presentation Decision-Making   MEDIUM  Complexity : 1-2 comorbidities / personal factors will impact the outcome/ POC  MEDIUM Complexity : 3 Standardized tests and measures addressing body structure, function, activity limitation and / or participation in recreation  MEDIUM Complexity : Evolving with changing characteristics  MEDIUM Complexity : FOTO score of 26-74      Based on the above components, the patient evaluation is determined to be of the following complexity level: MEDIUM    Pain Ratin/10 (multiple areas of bruising/contusion)/headache/dizziness/nausea    Activity Tolerance:   Fair: complained of pain  Please refer to the flowsheet for vital signs taken during this treatment. After treatment patient left in no apparent distress:   Supine in bed, Call bell within reach, Bed / chair alarm activated, Side rails x 3, and nurse notified. COMMUNICATION/EDUCATION:   The patients plan of care was discussed with: Occupational therapist, Registered nurse, Case management, and Certified nursing assistant/patient care technician. Fall prevention education was provided and the patient/caregiver indicated understanding., Patient/family have participated as able in goal setting and plan of care. , and Patient/family agree to work toward stated goals and plan of care.     Thank you for this referral.  Eliseo Vines   Time Calculation: 35 mins

## 2020-05-27 NOTE — PROGRESS NOTES
MD informed that patient ripped out IV and did not want to be stuck again for lab draw. Per MD- okay to have no line at this time and cancel ammonia lab draw order. Order received for PO zofran 4 mg Q8H PRN.

## 2020-05-27 NOTE — CONSULTS
[address]    Name:  Virginia Oconnor  MR#:  [id number]  :  1945  ACCOUNT #:  [de-identified]  DATE OF SERVICE:  2020    HISTORY OF PRESENT ILLNESS:  This 72-year right-handed male was admitted on the  with complaints of chest pain, not felt to be cardiac in etiology, was ruled out for COVID-19, was found to have a UTI. The patient is noted to be a poor historian with past notes mentioning poor historian and encephalopathy related to UTI. I am asked to see the patient for a tremor in his left arm. The patient reports he has had this since his stroke left him with residual left-sided weakness which was 27 years ago. The patient cannot state whether this occurs at rest or when he is trying to do activities. The patient mainly complains of having a headache, feeling dizzy and having nausea. The patient has stroke risk factors of diabetes, obstructive sleep apnea, hypertension and prior stroke. The patient attempts to tell me that he has either post-concussion syndrome and/or PTSD. It is not clear. PAST MEDICAL HISTORY:  1.  Diabetes. 2.  Hypertension. 3.  History of stroke with left-sided weakness. 4.  Obstructive sleep apnea, on CPAP. 5.  Depression. 6.  Anxiety. 7.  IBS. 8.  Cholecystectomy. 9.  Total knee replacement. 10.  Possible post-concussion syndrome and/or PTSD. REVIEW OF SYSTEMS:  Limited due to patient factors. MEDICATIONS AT HOME:  According to the chart,  1. Zofran. 2.   Effexor  mg a day. 3.  Synthroid. 4.  Amoxicillin. 5.  Acidophilus. ALLERGIES:  IODINE. SOCIAL HISTORY:  He [place] [facility] [place] [address]. Retired from finance. No tobacco, alcohol or drug use. FAMILY HISTORY:  He denies any neurological issues in his family. PHYSICAL EXAMINATION:  VITAL SIGNS:  Blood pressure 113/72, pulse 88, respiratory rate 20, saturating 93% on room air, temperature is 98.1. BMI of 36.   GENERAL:  He is a well-nourished, well-developed obese male, lying in bed, in no distress. HEART:  Regular rate and rhythm without murmur. Carotids 2+. No bruits. EXTREMITIES:  Warm. He has 2+ radial pulses. NEUROLOGIC EXAMINATION:  Mental status, he is alert. He is oriented to person, place, situation. He has hesitant speech and possibly impaired memory and has word-finding. Cranial nerve exam, no facial asymmetry or ptosis. Extraocular eye movements intact without diplopia or nystagmus. Visual fields full. Pupils are round and reactive. Tongue midline. Palate elevated symmetrically. Trapezius and sternocleidomastoid are 5/5. Hearing intact. Strength and sensation intact. Motor exam, 5/5 strength in the right upper extremity and 4/5 strength in the left with pronator drift bilateral lower extremities, possibly mildly weakness versus poor effort. With hip flexor testing, he is 5/5 dorsiflexion and plantar flexion bilaterally. The patient has an intermittent tremor in his left upper extremity. When we start discussing his tremor, he is easily distractible and stops when I touch his arm. It has variable frequency at times involving his whole arm and other times, his wrist in an up and down-type fashion. Sensory exam was intact to pinprick throughout. Reflexes symmetric. Toes upgoing, left down the right. Coordination is intact on the right to finger-to-nose and rapid alternating movements, slow on the left with slight incoordination, not out of proportion to weakness. STUDIES AND REPORTS:  CT of the head on 04/28/2020 for headache, dizziness, vomiting, abdominal pain for the last 2 weeks, was negative for any acute changes. There is asymmetric volume loss on the right with ex vacuo dilatation of the right lateral ventricle.     ASSESSMENT:  This is a 72-year right-handed male with multiple ischemic stroke risk factors and history of prior right hemisphere infarct with left-sided residual weakness, admitted with chest pain, possible increased confusion over baseline, found to have an urinary tract infection, noted to have tremor which the patient reports has been present for 27 years. During the exam, there were clear non-physiological features to his tremor, occurred at rest and with action. I have a low suspicion for any concerning etiology for his tremor. This may be all psychogenic, but it is possible prior stroke has led to some involuntary movements in his left upper extremity with possible non-physiological overlay. Primidone is unlikely to help this tremor, recommend discontinuing this. If this is involuntary movement related to prior stroke, sometimes clonazepam helps but I am hesitant to start that at the current time given his unusual affect and unknown baseline. If he continues to be bothered by this tremor, I would recommend outpatient follow up in the clinic with a family member to further assess this tremor. Please call with any questions.       Pilar Gonzalez MD      [id number]  D:  05/26/2020 21:17  T:  05/27/2020 0:00  JOB #:  [id number]  CC:   (Delete CC field if not dictated.)

## 2020-05-27 NOTE — PROGRESS NOTES
Occupational Therapy  05/27/20    Order acknowledged, chart reviewed. Attempted to see patient for OT evaluation, RN providing pain medication and nursing care at bedside. Will follow up tomorrow with OT evaluation as able & appropriate.      Thank you,   Favian Calvillor, OTD, OTR/L

## 2020-05-27 NOTE — PROGRESS NOTES
Bedside and Verbal shift change report given to Boris Scott RN (oncoming nurse) by Edilia Nails RN (offgoing nurse). Report included the following information SBAR, Kardex, Intake/Output, MAR and Accordion.

## 2020-05-28 LAB
AMMONIA PLAS-SCNC: 20 UMOL/L
GLUCOSE BLD STRIP.AUTO-MCNC: 150 MG/DL (ref 65–100)
GLUCOSE BLD STRIP.AUTO-MCNC: 218 MG/DL (ref 65–100)
GLUCOSE BLD STRIP.AUTO-MCNC: 234 MG/DL (ref 65–100)
GLUCOSE BLD STRIP.AUTO-MCNC: 242 MG/DL (ref 65–100)
SERVICE CMNT-IMP: ABNORMAL

## 2020-05-28 PROCEDURE — 74011636637 HC RX REV CODE- 636/637: Performed by: FAMILY MEDICINE

## 2020-05-28 PROCEDURE — 74011250637 HC RX REV CODE- 250/637: Performed by: HOSPITALIST

## 2020-05-28 PROCEDURE — 97530 THERAPEUTIC ACTIVITIES: CPT

## 2020-05-28 PROCEDURE — 82140 ASSAY OF AMMONIA: CPT

## 2020-05-28 PROCEDURE — 74011250636 HC RX REV CODE- 250/636: Performed by: INTERNAL MEDICINE

## 2020-05-28 PROCEDURE — 74011250636 HC RX REV CODE- 250/636: Performed by: HOSPITALIST

## 2020-05-28 PROCEDURE — 97165 OT EVAL LOW COMPLEX 30 MIN: CPT

## 2020-05-28 PROCEDURE — 97535 SELF CARE MNGMENT TRAINING: CPT

## 2020-05-28 PROCEDURE — 74011636637 HC RX REV CODE- 636/637: Performed by: INTERNAL MEDICINE

## 2020-05-28 PROCEDURE — 74011250637 HC RX REV CODE- 250/637: Performed by: INTERNAL MEDICINE

## 2020-05-28 PROCEDURE — 65270000029 HC RM PRIVATE

## 2020-05-28 PROCEDURE — 82962 GLUCOSE BLOOD TEST: CPT

## 2020-05-28 PROCEDURE — 36415 COLL VENOUS BLD VENIPUNCTURE: CPT

## 2020-05-28 PROCEDURE — C9113 INJ PANTOPRAZOLE SODIUM, VIA: HCPCS | Performed by: HOSPITALIST

## 2020-05-28 PROCEDURE — 51798 US URINE CAPACITY MEASURE: CPT

## 2020-05-28 PROCEDURE — 74011000250 HC RX REV CODE- 250: Performed by: HOSPITALIST

## 2020-05-28 PROCEDURE — 74011250637 HC RX REV CODE- 250/637: Performed by: NURSE PRACTITIONER

## 2020-05-28 RX ORDER — FAMOTIDINE 20 MG/1
20 TABLET, FILM COATED ORAL DAILY
Status: DISCONTINUED | OUTPATIENT
Start: 2020-05-29 | End: 2020-05-28

## 2020-05-28 RX ORDER — TAMSULOSIN HYDROCHLORIDE 0.4 MG/1
0.4 CAPSULE ORAL DAILY
Status: DISCONTINUED | OUTPATIENT
Start: 2020-05-28 | End: 2020-05-30 | Stop reason: HOSPADM

## 2020-05-28 RX ORDER — LANOLIN ALCOHOL/MO/W.PET/CERES
1 CREAM (GRAM) TOPICAL
Status: DISCONTINUED | OUTPATIENT
Start: 2020-05-29 | End: 2020-05-30 | Stop reason: HOSPADM

## 2020-05-28 RX ORDER — HALOPERIDOL 5 MG/ML
2 INJECTION INTRAMUSCULAR ONCE
Status: COMPLETED | OUTPATIENT
Start: 2020-05-28 | End: 2020-05-28

## 2020-05-28 RX ORDER — FAMOTIDINE 20 MG/1
20 TABLET, FILM COATED ORAL DAILY
Status: DISCONTINUED | OUTPATIENT
Start: 2020-05-28 | End: 2020-05-30 | Stop reason: HOSPADM

## 2020-05-28 RX ORDER — FINASTERIDE 5 MG/1
5 TABLET, FILM COATED ORAL DAILY
Status: DISCONTINUED | OUTPATIENT
Start: 2020-05-28 | End: 2020-05-30 | Stop reason: HOSPADM

## 2020-05-28 RX ORDER — HALOPERIDOL 5 MG/ML
2 INJECTION INTRAMUSCULAR
Status: DISCONTINUED | OUTPATIENT
Start: 2020-05-28 | End: 2020-05-30 | Stop reason: HOSPADM

## 2020-05-28 RX ORDER — METFORMIN HYDROCHLORIDE 500 MG/1
500 TABLET ORAL 2 TIMES DAILY WITH MEALS
Status: DISCONTINUED | OUTPATIENT
Start: 2020-05-28 | End: 2020-05-30 | Stop reason: HOSPADM

## 2020-05-28 RX ORDER — LEVOTHYROXINE SODIUM 50 UG/1
50 TABLET ORAL
Status: DISCONTINUED | OUTPATIENT
Start: 2020-05-29 | End: 2020-05-30 | Stop reason: HOSPADM

## 2020-05-28 RX ORDER — VENLAFAXINE HYDROCHLORIDE 37.5 MG/1
150 CAPSULE, EXTENDED RELEASE ORAL
Status: DISCONTINUED | OUTPATIENT
Start: 2020-05-28 | End: 2020-05-30 | Stop reason: HOSPADM

## 2020-05-28 RX ORDER — OXYBUTYNIN CHLORIDE 5 MG/1
5 TABLET, EXTENDED RELEASE ORAL DAILY
Status: DISCONTINUED | OUTPATIENT
Start: 2020-05-28 | End: 2020-05-30 | Stop reason: HOSPADM

## 2020-05-28 RX ORDER — VENLAFAXINE 100 MG/1
150 TABLET ORAL DAILY
Status: DISCONTINUED | OUTPATIENT
Start: 2020-05-28 | End: 2020-05-28 | Stop reason: CLARIF

## 2020-05-28 RX ORDER — ATORVASTATIN CALCIUM 10 MG/1
10 TABLET, FILM COATED ORAL
Status: DISCONTINUED | OUTPATIENT
Start: 2020-05-28 | End: 2020-05-30 | Stop reason: HOSPADM

## 2020-05-28 RX ADMIN — FINASTERIDE 5 MG: 5 TABLET, FILM COATED ORAL at 19:06

## 2020-05-28 RX ADMIN — MORPHINE SULFATE 2 MG: 2 INJECTION, SOLUTION INTRAMUSCULAR; INTRAVENOUS at 09:27

## 2020-05-28 RX ADMIN — Medication 10 ML: at 06:00

## 2020-05-28 RX ADMIN — ASPIRIN 81 MG 81 MG: 81 TABLET ORAL at 09:30

## 2020-05-28 RX ADMIN — INSULIN GLARGINE 10 UNITS: 100 INJECTION, SOLUTION SUBCUTANEOUS at 09:27

## 2020-05-28 RX ADMIN — ONDANSETRON HYDROCHLORIDE 4 MG: 2 INJECTION, SOLUTION INTRAMUSCULAR; INTRAVENOUS at 00:56

## 2020-05-28 RX ADMIN — MORPHINE SULFATE 2 MG: 2 INJECTION, SOLUTION INTRAMUSCULAR; INTRAVENOUS at 00:30

## 2020-05-28 RX ADMIN — DOXYCYCLINE HYCLATE 100 MG: 100 TABLET, COATED ORAL at 20:52

## 2020-05-28 RX ADMIN — HALOPERIDOL LACTATE 2 MG: 5 INJECTION INTRAMUSCULAR at 23:17

## 2020-05-28 RX ADMIN — Medication 10 ML: at 22:00

## 2020-05-28 RX ADMIN — MORPHINE SULFATE 2 MG: 2 INJECTION, SOLUTION INTRAMUSCULAR; INTRAVENOUS at 05:00

## 2020-05-28 RX ADMIN — HALOPERIDOL LACTATE 2 MG: 5 INJECTION INTRAMUSCULAR at 15:59

## 2020-05-28 RX ADMIN — PRIMIDONE 50 MG: 50 TABLET ORAL at 20:52

## 2020-05-28 RX ADMIN — ONDANSETRON 4 MG: 4 TABLET, ORALLY DISINTEGRATING ORAL at 09:28

## 2020-05-28 RX ADMIN — ENOXAPARIN SODIUM 40 MG: 40 INJECTION SUBCUTANEOUS at 12:43

## 2020-05-28 RX ADMIN — OXYBUTYNIN CHLORIDE 5 MG: 5 TABLET, EXTENDED RELEASE ORAL at 19:06

## 2020-05-28 RX ADMIN — ONDANSETRON HYDROCHLORIDE 4 MG: 2 INJECTION, SOLUTION INTRAMUSCULAR; INTRAVENOUS at 20:27

## 2020-05-28 RX ADMIN — VENLAFAXINE HYDROCHLORIDE 150 MG: 37.5 CAPSULE, EXTENDED RELEASE ORAL at 19:06

## 2020-05-28 RX ADMIN — CASTOR OIL AND BALSAM, PERU: 788; 87 OINTMENT TOPICAL at 21:35

## 2020-05-28 RX ADMIN — ONDANSETRON 4 MG: 4 TABLET, ORALLY DISINTEGRATING ORAL at 19:09

## 2020-05-28 RX ADMIN — METFORMIN HYDROCHLORIDE 500 MG: 500 TABLET ORAL at 19:06

## 2020-05-28 RX ADMIN — PRIMIDONE 50 MG: 50 TABLET ORAL at 09:31

## 2020-05-28 RX ADMIN — ONDANSETRON HYDROCHLORIDE 4 MG: 2 INJECTION, SOLUTION INTRAMUSCULAR; INTRAVENOUS at 05:00

## 2020-05-28 RX ADMIN — METOPROLOL TARTRATE 12.5 MG: 25 TABLET, FILM COATED ORAL at 09:29

## 2020-05-28 RX ADMIN — Medication 1 CAPSULE: at 09:28

## 2020-05-28 RX ADMIN — METOPROLOL TARTRATE 12.5 MG: 25 TABLET, FILM COATED ORAL at 20:52

## 2020-05-28 RX ADMIN — CASTOR OIL AND BALSAM, PERU: 788; 87 OINTMENT TOPICAL at 15:23

## 2020-05-28 RX ADMIN — SODIUM CHLORIDE 40 MG: 9 INJECTION INTRAMUSCULAR; INTRAVENOUS; SUBCUTANEOUS at 09:28

## 2020-05-28 RX ADMIN — OXYCODONE 5 MG: 5 TABLET ORAL at 22:28

## 2020-05-28 RX ADMIN — INSULIN LISPRO 3 UNITS: 100 INJECTION, SOLUTION INTRAVENOUS; SUBCUTANEOUS at 12:43

## 2020-05-28 RX ADMIN — INSULIN LISPRO 3 UNITS: 100 INJECTION, SOLUTION INTRAVENOUS; SUBCUTANEOUS at 16:30

## 2020-05-28 RX ADMIN — INSULIN LISPRO 2 UNITS: 100 INJECTION, SOLUTION INTRAVENOUS; SUBCUTANEOUS at 21:35

## 2020-05-28 RX ADMIN — CASTOR OIL AND BALSAM, PERU: 788; 87 OINTMENT TOPICAL at 06:00

## 2020-05-28 RX ADMIN — ATORVASTATIN CALCIUM 10 MG: 10 TABLET, FILM COATED ORAL at 20:52

## 2020-05-28 RX ADMIN — FAMOTIDINE 20 MG: 20 TABLET ORAL at 20:27

## 2020-05-28 RX ADMIN — TAMSULOSIN HYDROCHLORIDE 0.4 MG: 0.4 CAPSULE ORAL at 19:06

## 2020-05-28 NOTE — PROGRESS NOTES
Problem: Mobility Impaired (Adult and Pediatric)  Goal: *Acute Goals and Plan of Care (Insert Text)  Description: FUNCTIONAL STATUS PRIOR TO ADMISSION: The patient was functional at the wheelchair level and required contact guard assistance for transfers to the chair. HOME SUPPORT PRIOR TO ADMISSION: The patient lived alone with paid caregiver [address] to provide assistance. Lives in Senior Apartment Complex, no stairs. Daughter lives in [place].    Physical Therapy Goals  Initiated 5/27/2020  1. Patient will move from supine to sit and sit to supine , scoot up and down and roll side to side in bed with modified independence within 7 day(s). 2.  Patient will transfer from bed to chair and chair to bed with modified independence using the least restrictive device within 7 day(s). 3.  Patient will perform sit to stand with modified independence within 7 day(s). 4.  Patient will ambulate with contact guard assist for 50 feet with the least restrictive device within 7 day(s).      5/28/2020 1014 by Cary Jackson  Outcome: Not Progressing Towards Goal   PHYSICAL THERAPY TREATMENT  Patient: Jacy Nuno (22 y.o. male)  Date: 5/28/2020  Diagnosis: Chest pain [R07.9]  AMS (altered mental status) [R41.82]   Chest pain       Precautions: Fall  Chart, physical therapy assessment, plan of care and goals were reviewed. ASSESSMENT  Patient continues with skilled PT services and is not progressing towards goals. Patient was coopetaiveDiscussion with patient this am attempting to encourage him to participate in PT and explained the reason why, but he became agitated and told me to stop talking. Stated that he wants to talk to his doctor to be discharged. Patient being moved to a new room closer to nurse's station and attempted to get him to get out of bed to stand and walk to [facility]. He adamantly refused. Reported this to RN, Destini Serra/PCT & ., Mo Chaudhari.     Current Level of Function Impacting Discharge (mobility/balance): Would not mobilize. Other factors to consider for discharge: Lives alone/poor safety awareness/needs contact guard for all mobility         PLAN :  Patient continues to benefit from skilled intervention to address the above impairments. Continue treatment per established plan of care. to address goals. Recommendation for discharge: (in order for the patient to meet his/her long term goals)  Therapy up to 5 days/week in SNF setting    This discharge recommendation:  A follow-up discussion with the attending provider and/or case management is planned    IF patient discharges home will need the following DME: patient owns DME required for discharge       SUBJECTIVE:   Patient stated I want to get out of here. Tell my Doctor to write Discharge orders. He said I could go home. Let me call my son.     OBJECTIVE DATA SUMMARY:   Critical Behavior:  Neurologic State: Alert, Confused  Orientation Level: Disoriented to situation, Oriented to person, Disoriented to time  Cognition: Follows commands, Impaired decision making, Poor safety awareness, Impulsive     Pain Rating:  Complains of hurting all over    Activity Tolerance:   N/A       After treatment patient left in no apparent distress:   Supine in bed, Call bell within reach, Bed / chair alarm activated, Side rails x 3, and nurse/PCT notified. COMMUNICATION/COLLABORATION:   The patients plan of care was discussed with: Occupational therapist, Registered nurse, Case management, and Certified nursing assistant/patient care technician.      Lulu Casper   Time Calculation: 10 mins
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
## 2020-05-28 NOTE — PROGRESS NOTES
Bedside shift change report given to [facility] SHANNA CLARK (oncoming nurse) by Juan Gomes (offgoing nurse). Report included the following information SBAR, Kardex, Intake/Output and MAR.

## 2020-05-28 NOTE — PROGRESS NOTES
[address] [facility] Adult  Hospitalist Group                                                                                          Hospitalist Progress Note  Georgia Tyler MD  Answering service: [phone] OR [phone] from in house phone        Date of Service:  2020  NAME:  Jaja Liriano  :  1945  MRN:  [id number]      Admission Summary:   75year old male with past medical history DENG, diabetes, CVA with left sided deficits presenting to [facility] via EMS with chest pain that started at 2200 and given aspirin 324 mg. Patient poor historian and history obtained at 5 am. Currently, patient difficult to arouse but does state that his chest pain is centralized. Unable to give further details including pain scale, radiation, descriptive features. Per ED physician, patient endorsed nonproductive cough, nausea without vomiting. Denied fevers, chills. In the ED, chest x-ray with slight increasing bibasilar haziness/atelectasis, may represent interstitial edema or possibly atypical pneumonia. EKG without ST changes. Troponin wnl, D-dimer 0.7. Hospitalist consulted for further evaluation/admission.      Interval history / Subjective:   F/U  UTI, AMS  Quite agitated in the afternoon       SIRS- COV 2 test negative from   Assessment & Plan:     Chest pain  resolved  -No ischemic changes on EKG, normal troponin  -Cardiology evaluated the patient, no further work-up planned     Acute encephalopathy  Improved today  -Acute metabolic encephalopathy likely from infectious process  -Continue monitor  -Possible baseline dementia    Urinary tract infection  -Urine culture grew Staphylococcus simulans  -Change antibiotic doxycycline from IV to PO, will stop antibiotics tomorrow     Interstitial edema  -COVID-19 test negative  -No prior history of congestive heart failure  -saturating well on room air  - echocardiogram EF 60-65% with no RWMA    Diabetes  -Diabetes with hyperglycemia  -Not on home medications  - hemoglobin A1c 7.4, continue sliding scale insulin coverage, start lantus    Lt UE tremor  Neurology consulted, appreciate input  Nonphysiological tremor, DC primidone    History of CVA  -Continue aspirin      Hypothyroidism  -TSH  -We will need to resume Synthroid once pharmacy confirmed the dose    Depression  -Resume Effexor 150 mg daily    Agitation  -Haldol  -Consult Psychiatry    PT/OT SNF     Code status: Full  DVT prophylaxis: Lovenox  PTA: Home lives alone    Plan: The patient is medically stable, awaiting SNF    Care Plan discussed with: Patient/Family and Nurse  Anticipated Disposition:SNF  Anticipated Discharge: 24 hours to 48 hours     Hospital Problems  Date Reviewed: 5/28/2020          Codes Class Noted POA    AMS (altered mental status) ICD-10-CM: R41.82  ICD-9-CM: 780.97  5/25/2020 Unknown        * (Principal) Chest pain ICD-10-CM: R07.9  ICD-9-CM: 786.50  5/24/2020 Yes                Review of Systems:   Review of systems not obtained due to patient factors. Vital Signs:    Last 24hrs VS reviewed since prior progress note. Most recent are:  Visit Vitals  /82   Pulse 80   Temp 97.7 °F (36.5 °C)   Resp 16   Ht 5' 7\" (1.702 m)   Wt 104 kg (229 lb 4.5 oz)   SpO2 95%   BMI 35.91 kg/m²         Intake/Output Summary (Last 24 hours) at 5/28/2020 1009  Last data filed at 5/27/2020 1657  Gross per 24 hour   Intake    Output 300 ml   Net -300 ml        Physical Examination:             Constitutional:  No acute distress   ENT:  Oral mucosa moist, oropharynx benign. Resp:  CTA bilaterally. No wheezing/rhonchi/rales. No accessory muscle use   CV:  Regular rhythm, normal rate, no murmurs, gallops, rubs    GI:  Soft, non distended, non tender. normoactive bowel sounds, no hepatosplenomegaly     Musculoskeletal:  No edema, warm, 2+ pulses throughout    Neurologic:  Moves all extremities. Awake alert     Psych:  Good insight, Not anxious nor agitated.        Data Review:    I personally reviewed  Image and labs      Labs:     No results for input(s): WBC, HGB, HCT, PLT, HGBEXT, HCTEXT, PLTEXT, HGBEXT, HCTEXT, PLTEXT in the last 72 hours. Recent Labs     05/27/20  0129 05/26/20  1355   * 135*   K 3.7 4.4    105   CO2 24 23   BUN 12 13   CREA 1.01 1.14   * 275*   CA 8.4* 8.6     No results for input(s): ALT, AP, TBIL, TBILI, TP, ALB, GLOB, GGT, AML, LPSE in the last 72 hours. No lab exists for component: SGOT, GPT, AMYP, HLPSE  No results for input(s): INR, PTP, APTT, INREXT, INREXT in the last 72 hours. No results for input(s): FE, TIBC, PSAT, FERR in the last 72 hours. No results found for: FOL, RBCF   No results for input(s): PH, PCO2, PO2 in the last 72 hours. No results for input(s): CPK, CKNDX, TROIQ in the last 72 hours.     No lab exists for component: CPKMB  Lab Results   Component Value Date/Time    Cholesterol, total 173 05/24/2020 08:56 AM    HDL Cholesterol 43 05/24/2020 08:56 AM    LDL, calculated 99.4 05/24/2020 08:56 AM    Triglyceride 153 (H) 05/24/2020 08:56 AM    CHOL/HDL Ratio 4.0 05/24/2020 08:56 AM     Lab Results   Component Value Date/Time    Glucose (POC) 150 (H) 05/28/2020 06:33 AM    Glucose (POC) 205 (H) 05/27/2020 09:11 PM    Glucose (POC) 281 (H) 05/27/2020 04:01 PM    Glucose (POC) 254 (H) 05/27/2020 11:24 AM    Glucose (POC) 166 (H) 05/27/2020 06:43 AM     Lab Results   Component Value Date/Time    Color YELLOW/STRAW 05/24/2020 08:56 AM    Appearance TURBID (A) 05/24/2020 08:56 AM    Specific gravity 1.020 05/24/2020 08:56 AM    pH (UA) 7.0 05/24/2020 08:56 AM    Protein Negative 05/24/2020 08:56 AM    Glucose Negative 05/24/2020 08:56 AM    Ketone Negative 05/24/2020 08:56 AM    Bilirubin Negative 05/24/2020 08:56 AM    Urobilinogen 1.0 05/24/2020 08:56 AM    Nitrites Negative 05/24/2020 08:56 AM    Leukocyte Esterase SMALL (A) 05/24/2020 08:56 AM    Epithelial cells MODERATE (A) 05/24/2020 08:56 AM    Bacteria 4+ (A) 05/24/2020 08:56 AM    WBC 10-20 05/24/2020 08:56 AM    RBC 0-5 05/24/2020 08:56 AM         Medications Reviewed:     Current Facility-Administered Medications   Medication Dose Route Frequency    doxycycline (VIBRA-TABS) tablet 100 mg  100 mg Oral Q12H    insulin glargine (LANTUS) injection 10 Units  10 Units SubCUTAneous DAILY    ondansetron (ZOFRAN ODT) tablet 4 mg  4 mg Oral Q8H PRN    balsam peru-castor oiL (VENELEX) ointment   Topical Q8H    meclizine (ANTIVERT) tablet 25 mg  25 mg Oral Q6H PRN    prochlorperazine (COMPAZINE) with saline injection 10 mg  10 mg IntraVENous Q6H PRN    insulin lispro (HUMALOG) injection   SubCUTAneous AC&HS    glucose chewable tablet 16 g  4 Tab Oral PRN    glucagon (GLUCAGEN) injection 1 mg  1 mg IntraMUSCular PRN    dextrose 10% infusion 0-250 mL  0-250 mL IntraVENous PRN    oxyCODONE IR (ROXICODONE) tablet 5 mg  5 mg Oral Q4H PRN    lactobac ac& pc-s.therm-b.anim (ANGELIQUE Q/RISAQUAD)  1 Cap Oral DAILY    hydrALAZINE (APRESOLINE) 20 mg/mL injection 10 mg  10 mg IntraVENous Q6H PRN    metoprolol tartrate (LOPRESSOR) tablet 12.5 mg  12.5 mg Oral BID    primidone (MYSOLINE) tablet 50 mg  50 mg Oral TID    sodium chloride (NS) flush 5-40 mL  5-40 mL IntraVENous Q8H    sodium chloride (NS) flush 5-40 mL  5-40 mL IntraVENous PRN    acetaminophen (TYLENOL) tablet 650 mg  650 mg Oral Q4H PRN    naloxone (NARCAN) injection 0.4 mg  0.4 mg IntraVENous PRN    diphenhydrAMINE (BENADRYL) injection 12.5 mg  12.5 mg IntraVENous Q4H PRN    ondansetron (ZOFRAN) injection 4 mg  4 mg IntraVENous Q4H PRN    bisacodyL (DULCOLAX) tablet 5 mg  5 mg Oral DAILY PRN    enoxaparin (LOVENOX) injection 40 mg  40 mg SubCUTAneous Q24H    aspirin chewable tablet 81 mg  81 mg Oral DAILY    morphine injection 2 mg  2 mg IntraVENous Q4H PRN    labetaloL (NORMODYNE;TRANDATE) injection 10 mg  10 mg IntraVENous Q4H PRN    pantoprazole (PROTONIX) 40 mg in 0.9% sodium chloride 10 mL injection  40 mg IntraVENous DAILY     ______________________________________________________________________  EXPECTED LENGTH OF STAY: 1d 16h  ACTUAL LENGTH OF STAY:          Grady Robles MD

## 2020-05-28 NOTE — PROGRESS NOTES
Problem: Self Care Deficits Care Plan (Adult)  Goal: *Acute Goals and Plan of Care (Insert Text)  Description: Occupational Therapy Goals  Initiated: 5/28/2020   1. Patient will perform grooming with supervision/set-up sitting in chair within 7 day(s). 2.  Patient will perform bathing with mod A from chair within 7 day(s). 3.  Patient will perform upper body dressing and lower body dressing with mod A within 7 day(s). 4.  Patient will perform toilet transfers with mod A within 7 day(s). 5.  Patient will perform all aspects of toileting with min A within 7 day(s). FUNCTIONAL STATUS PRIOR TO ADMISSION: Pt lives in senior apartment with support from nurse aid - 7-3 per chart but per him assistance if available very day but Sunday. Pt reports he is assisted with showering and meals. He mobilizes short distance in apartment and uses his electric w/c to go to obtain food and groceries. HOME SUPPORT: Lives alone     Outcome: Progressing Towards Goal     OCCUPATIONAL THERAPY EVALUATION  Patient: Ashvin Madsen [de-identified]75 y.o. male)  Date: 5/28/2020  Primary Diagnosis: Chest pain [R07.9]  AMS (altered mental status) [R41.82]        Precautions:   Fall    ASSESSMENT  Based on the objective data described below, the patient presents with decreased independence with self care and functional mobility following admission for AMS and now presents with generalized debility. He is insistent to speak with the physician at this time and provided education the physician will complete rounds and he will be able to speak with them soon. Pt was agreeable to transfer OOB to chair and for toileting activities. Pt needing CG for bed moiblity and CG to min A for transfer to chair. Pt does require encouragement and education for engagement with . Current Level of Function Impacting Discharge (ADLs/self-care): min A for transfer but max to total A for ADl activities at this time. Functional Outcome Measure:   The patient scored 30 on the Barthel Index outcome measure which is indicative of 70% impairment with basic ADL activities. Other factors to consider for discharge: assistance level and support for home, question cognitive ability to continue to care for himself     Patient will benefit from skilled therapy intervention to address the above noted impairments. PLAN :  Recommendations and Planned Interventions: self care training, functional mobility training, therapeutic exercise, balance training, therapeutic activities, endurance activities, patient education, home safety training and family training/education    Frequency/Duration: Patient will be followed by occupational therapy 5 times a week to address goals. Recommendation for discharge: (in order for the patient to meet his/her long term goals)  Therapy up to 5 days/week in SNF setting or an intensive home health therapy program    This discharge recommendation:  Has been made in collaboration with the attending provider and/or case management    IF patient discharges home will need the following DME: TBD       SUBJECTIVE:   Patient stated I need to talk to the doctor.     OBJECTIVE DATA SUMMARY:   HISTORY:   Past Medical History:   Diagnosis Date    Diabetes ([facility] [address]     Gastrointestinal disorder     IBS    Hypertension     Psychiatric disorder     depression, anxiety    Sleep disorder     DENG, CPAP @ night    [facility]    L sided deficit     Past Surgical History:   Procedure Laterality Date    HX CHOLECYSTECTOMY      HX KNEE REPLACEMENT      Pt stated    NEUROLOGICAL PROCEDURE UNLISTED         Expanded or extensive additional review of patient history:     Home Situation  Home Environment: Apartment(senior apartment)  # Steps to Enter: 0  Rails to Enter: No  One/Two Story Residence: One story  Living Alone: Yes  Support Systems: Other (comments)  Patient Expects to be Discharged to[de-identified] Apartment  Current DME Used/Available at Home: Wheelchair, power, Walker, rolling, Shower chair  Tub or Shower Type: Tub/Shower combination    Hand dominance: Right    EXAMINATION OF PERFORMANCE DEFICITS:  Cognitive/Behavioral Status:  Neurologic State: Alert;Confused  Orientation Level: Oriented to person;Oriented to place; Disoriented to time;Disoriented to situation  Cognition: Follows commands  Perception: Appears intact  Perseveration: Perseverates during conversation  Safety/Judgement: Decreased awareness of environment;Decreased awareness of need for assistance;Decreased awareness of need for safety    Skin: see nursing notes; abrasions noted throughout his body    Edema: none noted    Hearing: Auditory  Auditory Impairment: None    Vision/Perceptual:                           Acuity: Within Defined Limits         Range of Motion:    AROM: Within functional limits  PROM: Within functional limits                      Strength:    Strength: Within functional limits                Coordination:  Coordination: Within functional limits  Fine Motor Skills-Upper: Right Intact; Left Intact    Gross Motor Skills-Upper: Right Intact; Left Intact    Tone & Sensation:    Tone: Normal  Sensation: Intact                      Balance:  Sitting: Intact  Standing: Impaired; With support  Standing - Static: Fair  Standing - Dynamic : Fair;Constant support    Functional Mobility and Transfers for ADLs:  Bed Mobility:  Rolling: Contact guard assistance  Supine to Sit: Contact guard assistance  Sit to Supine: Contact guard assistance  Scooting: Contact guard assistance    Transfers:  Sit to Stand: Contact guard assistance  Stand to Sit: Contact guard assistance  Bed to Chair: Contact guard assistance  Bathroom Mobility: Contact guard assistance  Toilet Transfer : Contact guard assistance    ADL Assessment:  Feeding: Supervision    Oral Facial Hygiene/Grooming: Supervision    Bathing: Total assistance    Upper Body Dressing: Total assistance    Lower Body Dressing:  Total assistance    Toileting: Total assistance                ADL Intervention and task modifications:   Pt was able to progress OOB and agreeable to transfer to chair. However, noted he was soiled. Pt needing max A to change brief and clean up following urinary incontinence. Cognitive Retraining  Safety/Judgement: Decreased awareness of environment;Decreased awareness of need for assistance;Decreased awareness of need for safety    Functional Measure:  Barthel Index:    Bathin  Bladder: 5  Bowels: 5  Groomin  Dressin  Feeding: 10  Mobility: 0  Stairs: 0  Toilet Use: 5  Transfer (Bed to Chair and Back): 10  Total: 35/100        The Barthel ADL Index: Guidelines  1. The index should be used as a record of what a patient does, not as a record of what a patient could do. 2. The main aim is to establish degree of independence from any help, physical or verbal, however minor and for whatever reason. 3. The need for supervision renders the patient not independent. 4. A patient's performance should be established using the best available evidence. Asking the patient, friends/relatives and nurses are the usual sources, but direct observation and common sense are also important. However direct testing is not needed. 5. Usually the patient's performance over the preceding 24-48 hours is important, but occasionally longer periods will be relevant. 6. Middle categories imply that the patient supplies over 50 per cent of the effort. 7. Use of aids to be independent is allowed. Gary Chipley., Barthel, D.W. (6290). Functional evaluation: the Barthel Index. [address] [facility] (14)2. [facility] dee dee ISIDRO Woo, Efrain [facility].Nga., Reji, [address] [place] [address] (). Measuring the change indisability after inpatient rehabilitation; comparison of the responsiveness of the Barthel Index and Functional Tyler Measure.  Journal of Neurology, Neurosurgery, and Psychiatry, 66(4), [phone]. JANUARY Davis, LENORA Espino, & Lord Stiven M.A. (2004.) Assessment of post-stroke quality of life in cost-effectiveness studies: The usefulness of the Barthel Index and the EuroQoL-5D. Quality of Life Research, 15, 309-67         Occupational Therapy Evaluation Charge Determination   History Examination Decision-Making   LOW Complexity : Brief history review  HIGH Complexity : 5 or more performance deficits relating to physical, cognitive , or psychosocial skils that result in activity limitations and / or participation restrictions HIGH Complexity : Patient presents with comorbidities that affect occupational performance. Signifigant modification of tasks or assistance (eg, physical or verbal) with assessment (s) is necessary to enable patient to complete evaluation       Based on the above components, the patient evaluation is determined to be of the following complexity level: LOW   Pain Rating:  He reports pain all overall    Activity Tolerance:   Fair  Please refer to the flowsheet for vital signs taken during this treatment. After treatment patient left in no apparent distress:    Sitting in chair and Call bell within reach    COMMUNICATION/EDUCATION:   The patients plan of care was discussed with: Physical therapist and Registered nurse. Home safety education was provided and the patient/caregiver indicated understanding. and Patient/family have participated as able in goal setting and plan of care. This patients plan of care is appropriate for delegation to [facility].     Thank you for this referral.  Peter Armas OT  Time Calculation: 21 mins

## 2020-05-29 LAB
ANION GAP SERPL CALC-SCNC: 7 MMOL/L (ref 5–15)
BASOPHILS # BLD: 0 K/UL (ref 0–0.1)
BASOPHILS NFR BLD: 0 % (ref 0–1)
BUN SERPL-MCNC: 18 MG/DL (ref 6–20)
BUN/CREAT SERPL: 14 (ref 12–20)
CALCIUM SERPL-MCNC: 8.7 MG/DL (ref 8.5–10.1)
CHLORIDE SERPL-SCNC: 105 MMOL/L (ref 97–108)
CO2 SERPL-SCNC: 21 MMOL/L (ref 21–32)
CREAT SERPL-MCNC: 1.27 MG/DL (ref 0.7–1.3)
DIFFERENTIAL METHOD BLD: ABNORMAL
EOSINOPHIL # BLD: 0.2 K/UL (ref 0–0.4)
EOSINOPHIL NFR BLD: 3 % (ref 0–7)
ERYTHROCYTE [DISTWIDTH] IN BLOOD BY AUTOMATED COUNT: 20.7 % (ref 11.5–14.5)
GLUCOSE BLD STRIP.AUTO-MCNC: 181 MG/DL (ref 65–100)
GLUCOSE BLD STRIP.AUTO-MCNC: 190 MG/DL (ref 65–100)
GLUCOSE BLD STRIP.AUTO-MCNC: 239 MG/DL (ref 65–100)
GLUCOSE BLD STRIP.AUTO-MCNC: 245 MG/DL (ref 65–100)
GLUCOSE SERPL-MCNC: 173 MG/DL (ref 65–100)
HCT VFR BLD AUTO: 41.7 % (ref 36.6–50.3)
HGB BLD-MCNC: 12.7 G/DL (ref 12.1–17)
IMM GRANULOCYTES # BLD AUTO: 0 K/UL (ref 0–0.04)
IMM GRANULOCYTES NFR BLD AUTO: 0 % (ref 0–0.5)
LYMPHOCYTES # BLD: 2.3 K/UL (ref 0.8–3.5)
LYMPHOCYTES NFR BLD: 37 % (ref 12–49)
MCH RBC QN AUTO: 25.6 PG (ref 26–34)
MCHC RBC AUTO-ENTMCNC: 30.5 G/DL (ref 30–36.5)
MCV RBC AUTO: 84.1 FL (ref 80–99)
MONOCYTES # BLD: 0.9 K/UL (ref 0–1)
MONOCYTES NFR BLD: 15 % (ref 5–13)
NEUTS SEG # BLD: 2.9 K/UL (ref 1.8–8)
NEUTS SEG NFR BLD: 45 % (ref 32–75)
NRBC # BLD: 0 K/UL (ref 0–0.01)
NRBC BLD-RTO: 0 PER 100 WBC
PLATELET # BLD AUTO: 137 K/UL (ref 150–400)
POTASSIUM SERPL-SCNC: 4.1 MMOL/L (ref 3.5–5.1)
RBC # BLD AUTO: 4.96 M/UL (ref 4.1–5.7)
RBC MORPH BLD: ABNORMAL
SERVICE CMNT-IMP: ABNORMAL
SODIUM SERPL-SCNC: 133 MMOL/L (ref 136–145)
WBC # BLD AUTO: 6.3 K/UL (ref 4.1–11.1)

## 2020-05-29 PROCEDURE — 97535 SELF CARE MNGMENT TRAINING: CPT

## 2020-05-29 PROCEDURE — 74011250637 HC RX REV CODE- 250/637: Performed by: HOSPITALIST

## 2020-05-29 PROCEDURE — 74011250637 HC RX REV CODE- 250/637: Performed by: NURSE PRACTITIONER

## 2020-05-29 PROCEDURE — 74011250637 HC RX REV CODE- 250/637: Performed by: INTERNAL MEDICINE

## 2020-05-29 PROCEDURE — 80048 BASIC METABOLIC PNL TOTAL CA: CPT

## 2020-05-29 PROCEDURE — 85025 COMPLETE CBC W/AUTO DIFF WBC: CPT

## 2020-05-29 PROCEDURE — 74011636637 HC RX REV CODE- 636/637: Performed by: INTERNAL MEDICINE

## 2020-05-29 PROCEDURE — 97530 THERAPEUTIC ACTIVITIES: CPT

## 2020-05-29 PROCEDURE — 82962 GLUCOSE BLOOD TEST: CPT

## 2020-05-29 PROCEDURE — 36415 COLL VENOUS BLD VENIPUNCTURE: CPT

## 2020-05-29 PROCEDURE — 74011250636 HC RX REV CODE- 250/636: Performed by: INTERNAL MEDICINE

## 2020-05-29 PROCEDURE — 74011250636 HC RX REV CODE- 250/636: Performed by: HOSPITALIST

## 2020-05-29 PROCEDURE — 74011000250 HC RX REV CODE- 250: Performed by: HOSPITALIST

## 2020-05-29 PROCEDURE — 74011636637 HC RX REV CODE- 636/637: Performed by: FAMILY MEDICINE

## 2020-05-29 PROCEDURE — C9113 INJ PANTOPRAZOLE SODIUM, VIA: HCPCS | Performed by: HOSPITALIST

## 2020-05-29 PROCEDURE — 65270000029 HC RM PRIVATE

## 2020-05-29 RX ORDER — PANTOPRAZOLE SODIUM 40 MG/1
40 TABLET, DELAYED RELEASE ORAL
Status: DISCONTINUED | OUTPATIENT
Start: 2020-05-30 | End: 2020-05-30 | Stop reason: HOSPADM

## 2020-05-29 RX ORDER — QUETIAPINE FUMARATE 25 MG/1
25 TABLET, FILM COATED ORAL
Status: DISCONTINUED | OUTPATIENT
Start: 2020-05-29 | End: 2020-05-30 | Stop reason: HOSPADM

## 2020-05-29 RX ORDER — BENZONATATE 100 MG/1
100 CAPSULE ORAL
Status: DISCONTINUED | OUTPATIENT
Start: 2020-05-29 | End: 2020-05-30 | Stop reason: HOSPADM

## 2020-05-29 RX ADMIN — Medication 10 ML: at 06:51

## 2020-05-29 RX ADMIN — METFORMIN HYDROCHLORIDE 500 MG: 500 TABLET ORAL at 09:37

## 2020-05-29 RX ADMIN — Medication 1 CAPSULE: at 09:00

## 2020-05-29 RX ADMIN — OXYBUTYNIN CHLORIDE 5 MG: 5 TABLET, EXTENDED RELEASE ORAL at 09:37

## 2020-05-29 RX ADMIN — INSULIN LISPRO 2 UNITS: 100 INJECTION, SOLUTION INTRAVENOUS; SUBCUTANEOUS at 06:45

## 2020-05-29 RX ADMIN — METFORMIN HYDROCHLORIDE 500 MG: 500 TABLET ORAL at 17:19

## 2020-05-29 RX ADMIN — TAMSULOSIN HYDROCHLORIDE 0.4 MG: 0.4 CAPSULE ORAL at 09:37

## 2020-05-29 RX ADMIN — ASPIRIN 81 MG 81 MG: 81 TABLET ORAL at 09:37

## 2020-05-29 RX ADMIN — FINASTERIDE 5 MG: 5 TABLET, FILM COATED ORAL at 09:37

## 2020-05-29 RX ADMIN — FERROUS SULFATE TAB 325 MG (65 MG ELEMENTAL FE) 325 MG: 325 (65 FE) TAB at 09:37

## 2020-05-29 RX ADMIN — VENLAFAXINE HYDROCHLORIDE 150 MG: 37.5 CAPSULE, EXTENDED RELEASE ORAL at 09:37

## 2020-05-29 RX ADMIN — METOPROLOL TARTRATE 12.5 MG: 25 TABLET, FILM COATED ORAL at 20:23

## 2020-05-29 RX ADMIN — CASTOR OIL AND BALSAM, PERU: 788; 87 OINTMENT TOPICAL at 06:51

## 2020-05-29 RX ADMIN — BENZONATATE 100 MG: 100 CAPSULE ORAL at 20:23

## 2020-05-29 RX ADMIN — ENOXAPARIN SODIUM 40 MG: 40 INJECTION SUBCUTANEOUS at 11:34

## 2020-05-29 RX ADMIN — CASTOR OIL AND BALSAM, PERU: 788; 87 OINTMENT TOPICAL at 17:21

## 2020-05-29 RX ADMIN — INSULIN LISPRO 3 UNITS: 100 INJECTION, SOLUTION INTRAVENOUS; SUBCUTANEOUS at 11:34

## 2020-05-29 RX ADMIN — DOXYCYCLINE HYCLATE 100 MG: 100 TABLET, COATED ORAL at 09:37

## 2020-05-29 RX ADMIN — Medication 10 ML: at 17:24

## 2020-05-29 RX ADMIN — INSULIN LISPRO 2 UNITS: 100 INJECTION, SOLUTION INTRAVENOUS; SUBCUTANEOUS at 22:21

## 2020-05-29 RX ADMIN — ATORVASTATIN CALCIUM 10 MG: 10 TABLET, FILM COATED ORAL at 20:23

## 2020-05-29 RX ADMIN — LEVOTHYROXINE SODIUM 50 MCG: 0.05 TABLET ORAL at 06:45

## 2020-05-29 RX ADMIN — SODIUM CHLORIDE 40 MG: 9 INJECTION INTRAMUSCULAR; INTRAVENOUS; SUBCUTANEOUS at 09:37

## 2020-05-29 RX ADMIN — CASTOR OIL AND BALSAM, PERU: 788; 87 OINTMENT TOPICAL at 22:00

## 2020-05-29 RX ADMIN — PRIMIDONE 50 MG: 50 TABLET ORAL at 09:37

## 2020-05-29 RX ADMIN — METOPROLOL TARTRATE 12.5 MG: 25 TABLET, FILM COATED ORAL at 09:37

## 2020-05-29 RX ADMIN — INSULIN GLARGINE 10 UNITS: 100 INJECTION, SOLUTION SUBCUTANEOUS at 10:42

## 2020-05-29 RX ADMIN — INSULIN LISPRO 2 UNITS: 100 INJECTION, SOLUTION INTRAVENOUS; SUBCUTANEOUS at 17:19

## 2020-05-29 NOTE — PROGRESS NOTES
Bedside and Verbal shift change report given to HARRIETT Gimenez (oncoming nurse) by Neto Regalado RN (offgoing nurse). Report included the following information SBAR.

## 2020-05-29 NOTE — CDMP QUERY
Pt admitted with Chest Pain and has possible baseline dementia documented. If possible, please document in the progress notes and discharge summary if you are evaluating and / or treating any of the following:  Dementia without behavioral disturbance  Dementia with behavioral disturbance  Other, please specify  Clinically unable to determine The medical record reflects the following: 
 
   Risk Factors:75 y/o male admitted with Chest Pain and Hx of  Depression/Bipolar D/O per daughter Clinical Indicators: Agitation with Aggressive behavior requiring security assist. 
   Treatment: Haldol , Effexor, Psych Consult. Thank you, Saira Treviño RN [address] [place] [address] [phone]

## 2020-05-29 NOTE — PROGRESS NOTES
Problem: Self Care Deficits Care Plan (Adult)  Goal: *Acute Goals and Plan of Care (Insert Text)  Description: Occupational Therapy Goals  Initiated: 5/28/2020   1. Patient will perform grooming with supervision/set-up sitting in chair within 7 day(s). 2.  Patient will perform bathing with mod A from chair within 7 day(s). 3.  Patient will perform upper body dressing and lower body dressing with mod A within 7 day(s). 4.  Patient will perform toilet transfers with mod A within 7 day(s). 5.  Patient will perform all aspects of toileting with min A within 7 day(s). FUNCTIONAL STATUS PRIOR TO ADMISSION: Pt lives in senior apartment with support from nurse aid - 7-3 per chart but per him assistance if available very day but Sunday. Pt reports he is assisted with showering and meals. He mobilizes short distance in apartment and uses his electric w/c to go to obtain food and groceries. HOME SUPPORT: Lives alone     Outcome: Progressing Towards Goal    OCCUPATIONAL THERAPY TREATMENT  Patient: Jacy Nuno (75 y.o. male)  Date: 5/29/2020  Diagnosis: Chest pain [R07.9]  AMS (altered mental status) [R41.82]   Chest pain       Precautions: Fall  Chart, occupational therapy assessment, plan of care, and goals were reviewed. ASSESSMENT  Patient continues with skilled OT services and is progressing towards goals. Pt yelling out and received attempting to get OOB. Pt had used call light but unsatisfied with waiting for response. Pt asking for his breakfast and pointing at the computer. Pt educated his breakfast has not yet arrived and that what he was seeing was actually a computer. He the continued looking around the room and still attempting to find breakfast.  Pt assisted with transfer to chair and overall did well with transfer using RW. He is oriented to self only and thinking this year was 5.        Current Level of Function Impacting Discharge (ADLs): supervision to min A    Other factors to consider for discharge: cognitive status         PLAN :  Patient continues to benefit from skilled intervention to address the above impairments. Continue treatment per established plan of care. to address goals. Recommend with staff: OOB to chair TID for meals. Recommend progression to and from bathroom with use of walker and gait belt for toileting. Recommend next OT session: [address] assessment recommend due to cognition    Recommendation for discharge: (in order for the patient to meet his/her long term goals)  Therapy up to 5 days/week in SNF setting or an intensive home health therapy program He will require 24 hour assistance at discharge. He is unsafe to discharge home without support. This discharge recommendation:  Has been made in collaboration with the attending provider and/or case management    IF patient discharges home will need the following DME: none       SUBJECTIVE:   Patient stated I want my breakfast.    OBJECTIVE DATA SUMMARY:   Cognitive/Behavioral Status:  Neurologic State: Alert;Confused  Orientation Level: Oriented to person;Disoriented to time;Disoriented to situation;Disoriented to place  Cognition: Impaired decision making;Poor safety awareness  Perception: Appears intact  Perseveration: No perseveration noted  Safety/Judgement: Decreased awareness of need for safety    Functional Mobility and Transfers for ADLs:  Bed Mobility:  Supine to Sit: Supervision  Sit to Supine: (remained OOB in chair)    Transfers:  Sit to Stand: Supervision          Balance:  Sitting: Intact  Standing: Intact; With support  Standing - Static: Fair  Standing - Dynamic : Fair    ADL Intervention:   Assisted from bed to chair. Cognitive Retraining  Safety/Judgement: Decreased awareness of need for safety    Pain:  No pain    Activity Tolerance:   Good  Please refer to the flowsheet for vital signs taken during this treatment.     After treatment patient left in no apparent distress: Sitting in chair and Call bell within reach    COMMUNICATION/COLLABORATION:   The patients plan of care was discussed with: Physical therapist and Registered nurse.      Rosangela Campuzano OT  Time Calculation: 13 mins

## 2020-05-29 NOTE — PROGRESS NOTES
Clinical Pharmacy Note: IV to PO Automatic Conversion  Please note: Domenica Merritt medication(s) (Protonix) has/have been changed from IV to PO based on the following critiera:    Patient is taking scheduled oral medications  Patient is tolerating tube feeds at goal rate or a full liquid, soft or regular diet    This IV to PO conversion is based on the [employer] approved automatic conversion policy for eligible patients. Please call with questions.

## 2020-05-29 NOTE — PROGRESS NOTES
[facility] Adult  Hospitalist Group                                                                                          Hospitalist Progress Note  Rolo Sánchez NP  Answering service: [phone] OR [phone] from in house phone        Date of Service:  2020  NAME:  Manuela Sterling  :  1945  MRN:  [id number]      Admission Summary:   75year old male with past medical history DENG, diabetes, CVA with left sided deficits presenting to [facility] via EMS with chest pain that started at 2200 and given aspirin 324 mg. Patient poor historian and history obtained at 5 am. Currently, patient difficult to arouse but does state that his chest pain is centralized. Unable to give further details including pain scale, radiation, descriptive features. Per ED physician, patient endorsed nonproductive cough, nausea without vomiting. Denied fevers, chills. In the ED, chest x-ray with slight increasing bibasilar haziness/atelectasis, may represent interstitial edema or possibly atypical pneumonia. EKG without ST changes. Troponin wnl, D-dimer 0.7. Hospitalist consulted for further evaluation/admission.     Interval history / Subjective:   F/U  UTI, AMS  Calm and cooperative     Assessment & Plan:     Chest pain  resolved  -No ischemic changes on EKG, normal troponin  -Cardiology evaluated the patient, no further work-up planned     Acute encephalopathy  Awake and alert, improved  -Acute metabolic encephalopathy likely from infectious process  -Continue monitor  -Possible baseline dementia   --not receptive to treatment plan for SNF    Urinary tract infection  -Urine culture grew Staphylococcus simulans  -Completed course of doxycycline ended      Interstitial edema  -COVID-19 test negative  -No prior history of congestive heart failure  -saturating well on room air  - echocardiogram EF 60-65% with no RWMA     Diabetes  -Diabetes with hyperglycemia  -On metformin, Lantus and sliding scale, BGL 200's  - hemoglobin A1c 7.4     Lt UE tremor  Neurology consulted, appreciate input  Nonphysiological tremor, DC primidone     History of CVA  -Continue aspirin      Hypothyroidism  -TSH  -Synthroid 50mcg daily     Depression  -Effexor 150 mg daily     Agitation  -Effexor, prn Haldol  -Consulted Psychiatry  -not receptive to treatment plan for SNF     PT/OT SNF        Plan: The patient is medically stable, awaiting SNF  Code status: Full  DVT prophylaxis: Lovenox  PTA: Home lives alone      Care Plan discussed with: Patient/ Family  Anticipated Disposition: SNF  Anticipated Discharge: 24-48 hours     Hospital Problems  Date Reviewed: 5/28/2020          Codes Class Noted POA    AMS (altered mental status) ICD-10-CM: R41.82  ICD-9-CM: 780.97  5/25/2020 Unknown        * (Principal) Chest pain ICD-10-CM: R07.9  ICD-9-CM: 786.50  5/24/2020 Yes                Review of Systems:   C/o intermittent rib pain r/t fall prior to admit 4/10 at its worst currently 2/10, c/o intermittent nausea relieved with zofran       Vital Signs:    Last 24hrs VS reviewed since prior progress note. Most recent are:  Visit Vitals  /83 (BP 1 Location: Right arm, BP Patient Position: Sitting)   Pulse 80   Temp 97.7 °F (36.5 °C)   Resp 16   Ht 5' 7\" (1.702 m)   Wt 104 kg (229 lb 4.5 oz)   SpO2 94%   BMI 35.91 kg/m²         Intake/Output Summary (Last 24 hours) at 5/29/2020 1421  Last data filed at 5/29/2020 1148  Gross per 24 hour   Intake    Output 400 ml   Net -400 ml        Physical Examination:       Constitutional:  No acute distress, calm, cooperative, pleasant    ENT:  Oral mucosa moist, oropharynx benign. Resp:  CTA bilaterally. No wheezing/rhonchi/rales. No accessory muscle use   CV:  Regular rhythm, normal rate, no murmurs, gallops, rubs    GI:  Soft, non distended, non tender.  normoactive bowel sounds, no hepatosplenomegaly     Musculoskeletal:  BLE edema, warm, 2+ pulses throughout    Neurologic:  Moves all extremities, left sided weakness. AAOx3, BEATRIS II-XII reviewed            Data Review:   I personally reviewed image and lab results. Labs:     Recent Labs     05/29/20  0502   WBC 6.3   HGB 12.7   HCT 41.7   *     Recent Labs     05/29/20  0502 05/27/20  0129   * 135*   K 4.1 3.7    106   CO2 21 24   BUN 18 12   CREA 1.27 1.01   * 186*   CA 8.7 8.4*     No results for input(s): ALT, AP, TBIL, TBILI, TP, ALB, GLOB, GGT, AML, LPSE in the last 72 hours. No lab exists for component: SGOT, GPT, AMYP, HLPSE  No results for input(s): INR, PTP, APTT, INREXT in the last 72 hours. No results for input(s): FE, TIBC, PSAT, FERR in the last 72 hours. No results found for: FOL, RBCF   No results for input(s): PH, PCO2, PO2 in the last 72 hours. No results for input(s): CPK, CKNDX, TROIQ in the last 72 hours.     No lab exists for component: CPKMB  Lab Results   Component Value Date/Time    Cholesterol, total 173 05/24/2020 08:56 AM    HDL Cholesterol 43 05/24/2020 08:56 AM    LDL, calculated 99.4 05/24/2020 08:56 AM    Triglyceride 153 (H) 05/24/2020 08:56 AM    CHOL/HDL Ratio 4.0 05/24/2020 08:56 AM     Lab Results   Component Value Date/Time    Glucose (POC) 245 (H) 05/29/2020 11:18 AM    Glucose (POC) 181 (H) 05/29/2020 06:00 AM    Glucose (POC) 234 (H) 05/28/2020 08:50 PM    Glucose (POC) 218 (H) 05/28/2020 04:54 PM    Glucose (POC) 242 (H) 05/28/2020 11:52 AM     Lab Results   Component Value Date/Time    Color YELLOW/STRAW 05/24/2020 08:56 AM    Appearance TURBID (A) 05/24/2020 08:56 AM    Specific gravity 1.020 05/24/2020 08:56 AM    pH (UA) 7.0 05/24/2020 08:56 AM    Protein Negative 05/24/2020 08:56 AM    Glucose Negative 05/24/2020 08:56 AM    Ketone Negative 05/24/2020 08:56 AM    Bilirubin Negative 05/24/2020 08:56 AM    Urobilinogen 1.0 05/24/2020 08:56 AM    Nitrites Negative 05/24/2020 08:56 AM    Leukocyte Esterase SMALL (A) 05/24/2020 08:56 AM    Epithelial cells MODERATE (A) 05/24/2020 08:56 AM    Bacteria 4+ (A) 05/24/2020 08:56 AM    WBC 10-20 05/24/2020 08:56 AM    RBC 0-5 05/24/2020 08:56 AM         Medications Reviewed:     Current Facility-Administered Medications   Medication Dose Route Frequency    [START ON 5/30/2020] pantoprazole (PROTONIX) tablet 40 mg  40 mg Oral ACB    oxybutynin chloride XL (DITROPAN XL) tablet 5 mg  5 mg Oral DAILY    finasteride (PROSCAR) tablet 5 mg  5 mg Oral DAILY    haloperidol lactate (HALDOL) injection 2 mg  2 mg IntraVENous Q6H PRN    metFORMIN (GLUCOPHAGE) tablet 500 mg  500 mg Oral BID WITH MEALS    levothyroxine (SYNTHROID) tablet 50 mcg  50 mcg Oral 6am    tamsulosin (FLOMAX) capsule 0.4 mg  0.4 mg Oral DAILY    ferrous sulfate tablet 325 mg  1 Tab Oral DAILY WITH BREAKFAST    atorvastatin (LIPITOR) tablet 10 mg  10 mg Oral QHS    venlafaxine-SR (EFFEXOR-XR) capsule 150 mg  150 mg Oral DAILY WITH BREAKFAST    famotidine (PEPCID) tablet 20 mg  20 mg Oral DAILY    doxycycline (VIBRA-TABS) tablet 100 mg  100 mg Oral Q12H    insulin glargine (LANTUS) injection 10 Units  10 Units SubCUTAneous DAILY    ondansetron (ZOFRAN ODT) tablet 4 mg  4 mg Oral Q8H PRN    balsam peru-castor oiL (VENELEX) ointment   Topical Q8H    meclizine (ANTIVERT) tablet 25 mg  25 mg Oral Q6H PRN    prochlorperazine (COMPAZINE) with saline injection 10 mg  10 mg IntraVENous Q6H PRN    insulin lispro (HUMALOG) injection   SubCUTAneous AC&HS    glucose chewable tablet 16 g  4 Tab Oral PRN    glucagon (GLUCAGEN) injection 1 mg  1 mg IntraMUSCular PRN    dextrose 10% infusion 0-250 mL  0-250 mL IntraVENous PRN    oxyCODONE IR (ROXICODONE) tablet 5 mg  5 mg Oral Q4H PRN    lactobac ac& pc-s.therm-b.anim (ANGELIQUE Q/RISAQUAD)  1 Cap Oral DAILY    hydrALAZINE (APRESOLINE) 20 mg/mL injection 10 mg  10 mg IntraVENous Q6H PRN    metoprolol tartrate (LOPRESSOR) tablet 12.5 mg  12.5 mg Oral BID    primidone (MYSOLINE) tablet 50 mg  50 mg Oral TID    sodium chloride (NS) flush 5-40 mL  5-40 mL IntraVENous Q8H    sodium chloride (NS) flush 5-40 mL  5-40 mL IntraVENous PRN    acetaminophen (TYLENOL) tablet 650 mg  650 mg Oral Q4H PRN    naloxone (NARCAN) injection 0.4 mg  0.4 mg IntraVENous PRN    diphenhydrAMINE (BENADRYL) injection 12.5 mg  12.5 mg IntraVENous Q4H PRN    ondansetron (ZOFRAN) injection 4 mg  4 mg IntraVENous Q4H PRN    bisacodyL (DULCOLAX) tablet 5 mg  5 mg Oral DAILY PRN    enoxaparin (LOVENOX) injection 40 mg  40 mg SubCUTAneous Q24H    aspirin chewable tablet 81 mg  81 mg Oral DAILY    morphine injection 2 mg  2 mg IntraVENous Q4H PRN    labetaloL (NORMODYNE;TRANDATE) injection 10 mg  10 mg IntraVENous Q4H PRN     ______________________________________________________________________  EXPECTED LENGTH OF STAY: 1d 16h  ACTUAL LENGTH OF STAY:          4                 Francis Puentes NP

## 2020-05-29 NOTE — PROGRESS NOTES
Problem: Mobility Impaired (Adult and Pediatric)  Goal: *Acute Goals and Plan of Care (Insert Text)  Description: FUNCTIONAL STATUS PRIOR TO ADMISSION: The patient was functional at the wheelchair level and required contact guard assistance for transfers to the chair. HOME SUPPORT PRIOR TO ADMISSION: The patient lived alone with paid caregiver [place] [address] to provide assistance. Lives in Senior Apartment Complex, no stairs. Daughter lives in [place].    Physical Therapy Goals  Initiated 5/27/2020  1. Patient will move from supine to sit and sit to supine , scoot up and down and roll side to side in bed with modified independence within 7 day(s). 2.  Patient will transfer from bed to chair and chair to bed with modified independence using the least restrictive device within 7 day(s). 3.  Patient will perform sit to stand with modified independence within 7 day(s). 4.  Patient will ambulate with contact guard assist for 50 feet with the least restrictive device within 7 day(s). Outcome: Progressing Towards Goal   PHYSICAL THERAPY TREATMENT  Patient: Bambi Petit (75 y.o. male)  Date: 5/29/2020  Diagnosis: Chest pain [R07.9]  AMS (altered mental status) [R41.82]   Chest pain       Precautions: Fall  Chart, physical therapy assessment, plan of care and goals were reviewed. ASSESSMENT  Patient continues with skilled PT services and is slowly progressing towards goals. Pt agreeable to transfer to the chair today. Pleasant and cooperative. Overall only requires CGA-SBA for mobility though limited gait distance to 6ft today d/t pt's request. Reporting pain from rib fractures and broken foot. Pt does not appear far from his baseline though will continue to require supervision with mobility and ADLs.      Current Level of Function Impacting Discharge (mobility/balance): CGA    Other factors to consider for discharge: ability to make sound decisions         PLAN :  Patient continues to benefit from skilled intervention to address the above impairments. Continue treatment per established plan of care. to address goals. Recommendation for discharge: (in order for the patient to meet his/her long term goals)  To be determined: pending plan with CM     This discharge recommendation:  Has been made in collaboration with the attending provider and/or case management    IF patient discharges home will need the following DME: patient owns DME required for discharge       SUBJECTIVE:   Patient stated I broke 8 ribs falling into the wall.     OBJECTIVE DATA SUMMARY:   Critical Behavior:  Neurologic State: Alert, Confused  Orientation Level: Oriented to person, Disoriented to time, Disoriented to situation, Disoriented to place  Cognition: Impaired decision making, Poor safety awareness  Safety/Judgement: Decreased awareness of need for safety  Functional Mobility Training:  Bed Mobility:  Rolling: Supervision  Supine to Sit: Supervision  Sit to Supine: (remained OOB in chair)           Transfers:  Sit to Stand: Stand-by assistance  Stand to Sit: Stand-by assistance                             Balance:  Sitting: Intact  Standing: Impaired  Standing - Static: Fair  Standing - Dynamic : Fair  Ambulation/Gait Training:  Distance (ft): 6 Feet (ft)  Assistive Device: Walker, rolling;Gait belt  Ambulation - Level of Assistance: Contact guard assistance        Gait Abnormalities: Decreased step clearance; Antalgic        Base of Support: Widened     Speed/Nicolle: Pace decreased (<100 feet/min)  Step Length: Right shortened;Left shortened                  Activity Tolerance:   Fair and requires rest breaks  Please refer to the flowsheet for vital signs taken during this treatment. After treatment patient left in no apparent distress:   Sitting in chair, Call bell within reach, and Bed / chair alarm activated    COMMUNICATION/COLLABORATION:   The patients plan of care was discussed with: Registered nurse.      Ewa Stewart PT   Time Calculation: 15 mins

## 2020-05-29 NOTE — PROGRESS NOTES
STEVEN: Patient is now agreeable to SNF for rehab. Referrals have been sent out (see below). Noted negative COVID testing 5/24. Patient usually lives at home alone in independent living apartment, and has caregivers daily from 7 AM-3 PM who assist with ADLs. Patient has Medicare and Medicaid. A UAI was done in April 2019 by [facility]. There are safety concerns about patient discharging back to independent living. Noted pysch consult. Chart reviewed. CM called son Gualberto Alonzo #[phone]. Son asked that CM call his sister Alejandro Kowalski [phone], as he is on a work call this morning. CM attempted to contact Alejandro Kowalski, left voicemail message. The son plans to call this CM at 12:30 today for conference call to include his sister to discuss discharge plans. 1:00 PM: CM had extensive conversation with son and daughter, Gualberto Alonzo and Andres Ambrosia via phone. CM explained that there are safety concerns with the patient returning to independent living, and LTC placement is being recommended. ARSH explained that the nurse, Leena Hernandez at the independent living facility where patient resides also has many concerns about the patient returning to independent living. The family stated that the patient has been to SNF before and had a bad experience and will likely continue to refuse SNF/LTC. The son handles the patient's finances and stated that the reason that the independent living facility is trying to evict him is not due to finances, it's due to the fact they are concerned about the patient's safety and inability to care for himself in independent living. CM encouraged the family to discuss SNF/LTC with the patient again. ARSH e-mailed SNF list to son and daughter at [address] [email or web address] and [email or web address]. ARSH spoke with hospitalist NP who has also spoken with the patient about LTC placement as well. Will await psych eval to determine competency. Care management will continue to follow.     ARSH spoke with MD. The patient is now agreeable to SNF for rehab. CM called daughter Jimi Guzman #[phone] and left message to discuss SNF choice. SNF list was emailed to son and daughter Sylvia Phipps today. 4:45 PM: CM spoke with daughter. She would like referrals sent to [facility], [facility], [address]. CM sent referrals. Son provided the below info regarding personal care provided by [facility].    Main Point of Contact:   Mo Krishnamurthy   [phone]  Current Aid:   Hours: M-F, 10-3pm and Sat 3-8pm  Alf Zambrano  [phone]      Wang Saenz, BSW/CRM

## 2020-05-29 NOTE — FORENSIC NURSE
PAULA Reddy responded to two [place]' on the patient. Security, nursing supervisor, and [place]- aware of incident and responded as well. Staff denies any injuries. PAULA Reddy verbalized understanding and instructed staff to call forensic office with any questions or concerns.

## 2020-05-30 VITALS
TEMPERATURE: 98.6 F | HEART RATE: 81 BPM | OXYGEN SATURATION: 91 % | BODY MASS INDEX: 35.99 KG/M2 | WEIGHT: 229.28 LBS | DIASTOLIC BLOOD PRESSURE: 69 MMHG | SYSTOLIC BLOOD PRESSURE: 103 MMHG | RESPIRATION RATE: 16 BRPM | HEIGHT: 67 IN

## 2020-05-30 LAB
GLUCOSE BLD STRIP.AUTO-MCNC: 128 MG/DL (ref 65–100)
GLUCOSE BLD STRIP.AUTO-MCNC: 163 MG/DL (ref 65–100)
SERVICE CMNT-IMP: ABNORMAL
SERVICE CMNT-IMP: ABNORMAL

## 2020-05-30 PROCEDURE — 74011250637 HC RX REV CODE- 250/637: Performed by: INTERNAL MEDICINE

## 2020-05-30 PROCEDURE — 82962 GLUCOSE BLOOD TEST: CPT

## 2020-05-30 PROCEDURE — 74011636637 HC RX REV CODE- 636/637: Performed by: FAMILY MEDICINE

## 2020-05-30 PROCEDURE — 74011636637 HC RX REV CODE- 636/637: Performed by: INTERNAL MEDICINE

## 2020-05-30 PROCEDURE — 74011250637 HC RX REV CODE- 250/637: Performed by: NURSE PRACTITIONER

## 2020-05-30 PROCEDURE — 74011250636 HC RX REV CODE- 250/636: Performed by: INTERNAL MEDICINE

## 2020-05-30 PROCEDURE — 74011250637 HC RX REV CODE- 250/637: Performed by: HOSPITALIST

## 2020-05-30 RX ORDER — OXYBUTYNIN CHLORIDE 5 MG/1
5 TABLET, EXTENDED RELEASE ORAL DAILY
Qty: 30 TAB | Refills: 1 | Status: SHIPPED | OUTPATIENT
Start: 2020-05-31

## 2020-05-30 RX ORDER — INSULIN GLARGINE 100 [IU]/ML
INJECTION, SOLUTION SUBCUTANEOUS
Qty: 1 VIAL | Refills: 1 | Status: SHIPPED | OUTPATIENT
Start: 2020-05-31

## 2020-05-30 RX ORDER — FINASTERIDE 5 MG/1
5 TABLET, FILM COATED ORAL DAILY
Qty: 30 TAB | Refills: 1 | Status: SHIPPED | OUTPATIENT
Start: 2020-05-31

## 2020-05-30 RX ORDER — METOPROLOL TARTRATE 25 MG/1
12.5 TABLET, FILM COATED ORAL 2 TIMES DAILY
Qty: 30 TAB | Refills: 1 | Status: SHIPPED | OUTPATIENT
Start: 2020-05-30

## 2020-05-30 RX ORDER — METFORMIN HYDROCHLORIDE 500 MG/1
500 TABLET ORAL 2 TIMES DAILY WITH MEALS
Qty: 60 TAB | Refills: 1 | Status: SHIPPED | OUTPATIENT
Start: 2020-05-30

## 2020-05-30 RX ORDER — GUAIFENESIN 100 MG/5ML
81 LIQUID (ML) ORAL DAILY
Qty: 30 TAB | Refills: 1 | Status: SHIPPED | OUTPATIENT
Start: 2020-05-31

## 2020-05-30 RX ORDER — FAMOTIDINE 20 MG/1
20 TABLET, FILM COATED ORAL DAILY
Qty: 30 TAB | Refills: 1 | Status: SHIPPED | OUTPATIENT
Start: 2020-05-31

## 2020-05-30 RX ORDER — ATORVASTATIN CALCIUM 10 MG/1
10 TABLET, FILM COATED ORAL
Qty: 30 TAB | Refills: 1 | Status: SHIPPED | OUTPATIENT
Start: 2020-05-30

## 2020-05-30 RX ORDER — OXYCODONE HYDROCHLORIDE 5 MG/1
5 TABLET ORAL
Qty: 10 TAB | Refills: 0 | Status: SHIPPED | OUTPATIENT
Start: 2020-05-30 | End: 2020-06-02

## 2020-05-30 RX ORDER — TAMSULOSIN HYDROCHLORIDE 0.4 MG/1
0.4 CAPSULE ORAL DAILY
Qty: 30 CAP | Refills: 1 | Status: SHIPPED | OUTPATIENT
Start: 2020-05-31

## 2020-05-30 RX ORDER — LANOLIN ALCOHOL/MO/W.PET/CERES
325 CREAM (GRAM) TOPICAL
Qty: 30 TAB | Refills: 1 | Status: SHIPPED | OUTPATIENT
Start: 2020-05-31

## 2020-05-30 RX ADMIN — CASTOR OIL AND BALSAM, PERU: 788; 87 OINTMENT TOPICAL at 06:24

## 2020-05-30 RX ADMIN — ENOXAPARIN SODIUM 40 MG: 40 INJECTION SUBCUTANEOUS at 10:56

## 2020-05-30 RX ADMIN — LEVOTHYROXINE SODIUM 50 MCG: 0.05 TABLET ORAL at 06:24

## 2020-05-30 RX ADMIN — TAMSULOSIN HYDROCHLORIDE 0.4 MG: 0.4 CAPSULE ORAL at 08:41

## 2020-05-30 RX ADMIN — INSULIN GLARGINE 10 UNITS: 100 INJECTION, SOLUTION SUBCUTANEOUS at 08:41

## 2020-05-30 RX ADMIN — OXYCODONE 5 MG: 5 TABLET ORAL at 01:55

## 2020-05-30 RX ADMIN — CASTOR OIL AND BALSAM, PERU: 788; 87 OINTMENT TOPICAL at 14:00

## 2020-05-30 RX ADMIN — ASPIRIN 81 MG 81 MG: 81 TABLET ORAL at 08:41

## 2020-05-30 RX ADMIN — INSULIN LISPRO 2 UNITS: 100 INJECTION, SOLUTION INTRAVENOUS; SUBCUTANEOUS at 12:20

## 2020-05-30 RX ADMIN — FAMOTIDINE 20 MG: 20 TABLET ORAL at 08:41

## 2020-05-30 RX ADMIN — OXYBUTYNIN CHLORIDE 5 MG: 5 TABLET, EXTENDED RELEASE ORAL at 08:41

## 2020-05-30 RX ADMIN — Medication 1 CAPSULE: at 08:41

## 2020-05-30 RX ADMIN — PANTOPRAZOLE SODIUM 40 MG: 40 TABLET, DELAYED RELEASE ORAL at 06:24

## 2020-05-30 RX ADMIN — Medication 10 ML: at 06:24

## 2020-05-30 RX ADMIN — FINASTERIDE 5 MG: 5 TABLET, FILM COATED ORAL at 08:41

## 2020-05-30 RX ADMIN — VENLAFAXINE HYDROCHLORIDE 150 MG: 37.5 CAPSULE, EXTENDED RELEASE ORAL at 08:41

## 2020-05-30 RX ADMIN — FERROUS SULFATE TAB 325 MG (65 MG ELEMENTAL FE) 325 MG: 325 (65 FE) TAB at 08:41

## 2020-05-30 RX ADMIN — METFORMIN HYDROCHLORIDE 500 MG: 500 TABLET ORAL at 08:41

## 2020-05-30 RX ADMIN — METOPROLOL TARTRATE 12.5 MG: 25 TABLET, FILM COATED ORAL at 08:41

## 2020-05-30 NOTE — PROGRESS NOTES
Transition of Care Plan to SNF/Rehab    SNF/Rehab Transition:  Patient has been accepted to [facility] and meets criteria for admission. Patient will transported by [facility] and expected to leave at 1530. Communication to Patient/Family:  CM contacted pt's daughter, Rudy Ring, to discuss disposition; she and pt are agreeable to the transition plan. Communication to SNF/Rehab:  Bedside RN, Cesario Brewster, has been notified to update the transition plan to the facility and call report (phone number [phone]). Discharge information has been updated on the AVS.     Discharge instructions to be fax'd to facility at (Fax # [phone]). Nursing please include all hard scripts for controlled substances, med rec and dc summary, and AVS in packet. Reviewed and confirmed with facility, [facility]: Shun Almeida, can manage the patient care needs for the following:     Josee Records with (X) only those applicable:    Medication:  []  Medications will be available at the facility  []  IV Antibiotics  [x]  Controlled Substance - hard copy to be sent with patient   []  Weekly Labs   Documents:  [] Hard RX  [x] MAR  [x] Kardex  [x] AVS  []Transfer Summary  [x]Discharge   Equipment:  []  CPAP/BiPAP  []  Wound Vacuum  []  Martinez or Urinary Device  []  PICC/Central Line  []  Nebulizer  []  Ventilator   Treatment:  []Isolation (for MRSA, VRE, etc.)  []Surgical Drain Management  []Tracheostomy Care  []Dressing Changes  []Dialysis with transportation and chair time  []PEG Care  []Oxygen  []Daily Weights for Heart Failure   Dietary:  []Any diet limitations  []Tube Feedings   []Total Parenteral Management (TPN)   Eligible for Medicaid Long Term Services and Supports  Yes:  [] Eligible for medical assistance or will become eligible within 180 days and UAI completed. [] Provider/Patient and/or support system has requested screening.   [] UAI copy provided to patient or responsible party  [] UAI unavailable at discharge will send once processed to SNF provider. [] UAI unavailable at discharged mailed to patient  No:   [] Private pay and is not financially eligible for Medicaid within the next 180 days. [] Reside out-of-state. [] A residents of a state owned/operated facility that is licensed  by [facility] or [facility]  [] Enrollment in [facility] hospice services  []  Medical [facility]  [] Patient /Family declines to have screening completed or provide financial information for screening     Financial Resources:  Medicaid    [] Initiated and application pending   [] Full coverage     Advanced Care Plan:  []Surrogate Decision Maker of Care  []POA  []Communicated Code Status: FULL CODE   Other  Pt currently has Medicaid.

## 2020-05-30 NOTE — PROGRESS NOTES
Bedside and Verbal shift change report given to Cesario Brewster RN (oncoming nurse) by Tramaine Rand RN (offgoing nurse). Report included the following information SBAR.

## 2020-05-30 NOTE — DISCHARGE INSTRUCTIONS
Discharge SNF/Rehab Instructions/LTAC       PATIENT ID: Enoc Dunlap  MRN: [id number]   YOB: 1945    DATE OF ADMISSION: 5/24/2020  1:33 AM    DATE OF DISCHARGE: 5/30/2020    PRIMARY CARE PROVIDER: None       ATTENDING PHYSICIAN: Yady Tomlin MD  DISCHARGING PROVIDER: Carmelo Torres MD     To contact this individual call [phone] and ask the  to page. If unavailable ask to be transferred the Adult Hospitalist Department. CONSULTATIONS: IP CONSULT TO HOSPITALIST  IP CONSULT TO CARDIOLOGY  IP CONSULT TO NEUROLOGY  IP CONSULT TO PSYCHIATRY    PROCEDURES/SURGERIES: * No surgery found *    ADMITTING DIAGNOSES & HOSPITAL COURSE:   Chest pain  resolved  -No ischemic changes on EKG, normal troponin  -Cardiology evaluated the patient, no further work-up planned     Acute encephalopathy  Improved today  -Acute metabolic encephalopathy likely from infectious process  -Continue monitor  -Possible baseline dementia     Urinary tract infection  -Urine culture grew Staphylococcus simulans  -Change antibiotic doxycycline from IV to PO, will stop antibiotics tomorrow     Interstitial edema  -COVID-19 test negative  -No prior history of congestive heart failure  -saturating well on room air  - echocardiogram EF 60-65% with no RWMA     Diabetes  -Diabetes with hyperglycemia  -Not on home medications  - hemoglobin A1c 7.4, continue sliding scale insulin coverage/lantus     Lt UE tremor  Neurology consulted, appreciate input  Nonphysiological tremor, DC primidone     History of CVA  -Continue aspirin      Hypothyroidism  -TSH  -We will need to resume Synthroid once pharmacy confirmed the dose     Depression  -Resume Effexor 150 mg daily     Agitation  -Haldol  -Appreciate Psychiatry, started on seroquel prn     PT/OT SNF     Code status: Full  DVT prophylaxis: Lovenox  PTA: Home lives alone           DISCHARGE DIAGNOSES / PLAN:      1.   Chest pain       PENDING TEST RESULTS:   At the time of discharge the following test results are still pending: none    FOLLOW UP APPOINTMENTS:    Follow-up Information     Follow up With Specialties Details Why Contact Info    PCP  In 1 week             ADDITIONAL CARE RECOMMENDATIONS:   Follow up with PMD    DIET: Cardiac Diet    ACTIVITY: Activity as tolerated      DISCHARGE MEDICATIONS:   See Medication Reconciliation Form      NOTIFY YOUR PHYSICIAN FOR ANY OF THE FOLLOWING:   Fever over 101 degrees for 24 hours. Chest pain, shortness of breath, fever, chills, nausea, vomiting, diarrhea, change in mentation, falling, weakness, bleeding. Severe pain or pain not relieved by medications. Or, any other signs or symptoms that you may have questions about.     DISPOSITION:    Home With:   OT  PT  HH  RN      x SNF/Inpatient Rehab/LTAC    Independent/assisted living    Hospice    Other:       PATIENT CONDITION AT DISCHARGE:     Functional status    Poor    x Deconditioned     Independent      Cognition    x Lucid     Forgetful     Dementia      Catheters/lines (plus indication)    Martinez     PICC     PEG    x None      Code status    x Full code     DNR      PHYSICAL EXAMINATION AT DISCHARGE:  Please see progress note      CHRONIC MEDICAL DIAGNOSES:  Problem List as of 5/30/2020 Date Reviewed: 5/28/2020          Codes Class Noted - Resolved    AMS (altered mental status) ICD-10-CM: R41.82  ICD-9-CM: 780.97  5/25/2020 - Present        * (Principal) Chest pain ICD-10-CM: R07.9  ICD-9-CM: 786.50  5/24/2020 - Present        Altered mental status ICD-10-CM: R41.82  ICD-9-CM: 780.97  3/6/2019 - Present                CDMP Checked:   Yes x     PROBLEM LIST Updated:  Yes x         Signed:   Luisa Bryant MD  5/30/2020  1:38 PM

## 2020-05-30 NOTE — PROGRESS NOTES
[id number] [facility] Adult  Hospitalist Group                                                                                          Hospitalist Progress Note  Radha Grover MD  Answering service: [phone] OR [phone] from in house phone        Date of Service:  2020  NAME:  Rox De La Cruz  :  1945  MRN:  [id number]      Admission Summary:   75year old male with past medical history DENG, diabetes, CVA with left sided deficits presenting to [facility] via EMS with chest pain that started at 2200 and given aspirin 324 mg. Patient poor historian and history obtained at 5 am. Currently, patient difficult to arouse but does state that his chest pain is centralized. Unable to give further details including pain scale, radiation, descriptive features. Per ED physician, patient endorsed nonproductive cough, nausea without vomiting. Denied fevers, chills. In the ED, chest x-ray with slight increasing bibasilar haziness/atelectasis, may represent interstitial edema or possibly atypical pneumonia. EKG without ST changes. Troponin wnl, D-dimer 0.7. Hospitalist consulted for further evaluation/admission.      Interval history / Subjective:   F/U  UTI, AMS  No new issues  Awaiting rehab       SIRS- COV 2 test negative from   Assessment & Plan:     Chest pain  resolved  -No ischemic changes on EKG, normal troponin  -Cardiology evaluated the patient, no further work-up planned     Acute encephalopathy  Improved today  -Acute metabolic encephalopathy likely from infectious process  -Continue monitor  -Possible baseline dementia    Urinary tract infection  -Urine culture grew Staphylococcus simulans  -Change antibiotic doxycycline from IV to PO, will stop antibiotics tomorrow     Interstitial edema  -COVID-19 test negative  -No prior history of congestive heart failure  -saturating well on room air  - echocardiogram EF 60-65% with no RWMA    Diabetes  -Diabetes with hyperglycemia  -Not on home medications  - hemoglobin A1c 7.4, continue sliding scale insulin coverage/lantus    Lt UE tremor  Neurology consulted, appreciate input  Nonphysiological tremor, DC primidone    History of CVA  -Continue aspirin      Hypothyroidism  -TSH  -We will need to resume Synthroid once pharmacy confirmed the dose    Depression  -Resume Effexor 150 mg daily    Agitation  -Haldol  -Appreciate Psychiatry, started on seroquel prn    PT/OT SNF     Code status: Full  DVT prophylaxis: Lovenox  PTA: Home lives alone    Plan: Medically stable, awaiting rehab    Care Plan discussed with: Patient/Family and Nurse  Anticipated Disposition:SNF  Anticipated Discharge: 24 hours to 48 hours     Hospital Problems  Date Reviewed: 5/28/2020          Codes Class Noted POA    AMS (altered mental status) ICD-10-CM: R41.82  ICD-9-CM: 780.97  5/25/2020 Unknown        * (Principal) Chest pain ICD-10-CM: R07.9  ICD-9-CM: 786.50  5/24/2020 Yes                Review of Systems:   Review of systems not obtained due to patient factors. Vital Signs:    Last 24hrs VS reviewed since prior progress note. Most recent are:  Visit Vitals  /76 (BP 1 Location: Left arm, BP Patient Position: At rest)   Pulse 80   Temp 98.8 °F (37.1 °C)   Resp 16   Ht 5' 7\" (1.702 m)   Wt 104 kg (229 lb 4.5 oz)   SpO2 90%   BMI 35.91 kg/m²         Intake/Output Summary (Last 24 hours) at 5/30/2020 1012  Last data filed at 5/29/2020 1148  Gross per 24 hour   Intake    Output 400 ml   Net -400 ml        Physical Examination:             Constitutional:  No acute distress   ENT:  Oral mucosa moist, oropharynx benign. Resp:  CTA bilaterally. No wheezing/rhonchi/rales. No accessory muscle use   CV:  Regular rhythm, normal rate, no murmurs, gallops, rubs    GI:  Soft, non distended, non tender. normoactive bowel sounds, no hepatosplenomegaly     Musculoskeletal:  No edema, warm, 2+ pulses throughout    Neurologic:  Moves all extremities.   Awake alert     Psych:  Good insight, Not anxious nor agitated. Data Review:    I personally reviewed  Image and labs      Labs:     Recent Labs     05/29/20  0502   WBC 6.3   HGB 12.7   HCT 41.7   *     Recent Labs     05/29/20  0502   *   K 4.1      CO2 21   BUN 18   CREA 1.27   *   CA 8.7     No results for input(s): ALT, AP, TBIL, TBILI, TP, ALB, GLOB, GGT, AML, LPSE in the last 72 hours. No lab exists for component: SGOT, GPT, AMYP, HLPSE  No results for input(s): INR, PTP, APTT, INREXT, INREXT in the last 72 hours. No results for input(s): FE, TIBC, PSAT, FERR in the last 72 hours. No results found for: FOL, RBCF   No results for input(s): PH, PCO2, PO2 in the last 72 hours. No results for input(s): CPK, CKNDX, TROIQ in the last 72 hours.     No lab exists for component: CPKMB  Lab Results   Component Value Date/Time    Cholesterol, total 173 05/24/2020 08:56 AM    HDL Cholesterol 43 05/24/2020 08:56 AM    LDL, calculated 99.4 05/24/2020 08:56 AM    Triglyceride 153 (H) 05/24/2020 08:56 AM    CHOL/HDL Ratio 4.0 05/24/2020 08:56 AM     Lab Results   Component Value Date/Time    Glucose (POC) 128 (H) 05/30/2020 06:16 AM    Glucose (POC) 239 (H) 05/29/2020 09:52 PM    Glucose (POC) 190 (H) 05/29/2020 05:06 PM    Glucose (POC) 245 (H) 05/29/2020 11:18 AM    Glucose (POC) 181 (H) 05/29/2020 06:00 AM     Lab Results   Component Value Date/Time    Color YELLOW/STRAW 05/24/2020 08:56 AM    Appearance TURBID (A) 05/24/2020 08:56 AM    Specific gravity 1.020 05/24/2020 08:56 AM    pH (UA) 7.0 05/24/2020 08:56 AM    Protein Negative 05/24/2020 08:56 AM    Glucose Negative 05/24/2020 08:56 AM    Ketone Negative 05/24/2020 08:56 AM    Bilirubin Negative 05/24/2020 08:56 AM    Urobilinogen 1.0 05/24/2020 08:56 AM    Nitrites Negative 05/24/2020 08:56 AM    Leukocyte Esterase SMALL (A) 05/24/2020 08:56 AM    Epithelial cells MODERATE (A) 05/24/2020 08:56 AM    Bacteria 4+ (A) 05/24/2020 08:56 AM    WBC 10-20 05/24/2020 08:56 AM    RBC 0-5 05/24/2020 08:56 AM         Medications Reviewed:     Current Facility-Administered Medications   Medication Dose Route Frequency    pantoprazole (PROTONIX) tablet 40 mg  40 mg Oral ACB    QUEtiapine (SEROquel) tablet 25 mg  25 mg Oral Q6H PRN    benzonatate (TESSALON) capsule 100 mg  100 mg Oral TID PRN    oxybutynin chloride XL (DITROPAN XL) tablet 5 mg  5 mg Oral DAILY    finasteride (PROSCAR) tablet 5 mg  5 mg Oral DAILY    haloperidol lactate (HALDOL) injection 2 mg  2 mg IntraVENous Q6H PRN    metFORMIN (GLUCOPHAGE) tablet 500 mg  500 mg Oral BID WITH MEALS    levothyroxine (SYNTHROID) tablet 50 mcg  50 mcg Oral 6am    tamsulosin (FLOMAX) capsule 0.4 mg  0.4 mg Oral DAILY    ferrous sulfate tablet 325 mg  1 Tab Oral DAILY WITH BREAKFAST    atorvastatin (LIPITOR) tablet 10 mg  10 mg Oral QHS    venlafaxine-SR (EFFEXOR-XR) capsule 150 mg  150 mg Oral DAILY WITH BREAKFAST    famotidine (PEPCID) tablet 20 mg  20 mg Oral DAILY    insulin glargine (LANTUS) injection 10 Units  10 Units SubCUTAneous DAILY    ondansetron (ZOFRAN ODT) tablet 4 mg  4 mg Oral Q8H PRN    balsam peru-castor oiL (VENELEX) ointment   Topical Q8H    meclizine (ANTIVERT) tablet 25 mg  25 mg Oral Q6H PRN    prochlorperazine (COMPAZINE) with saline injection 10 mg  10 mg IntraVENous Q6H PRN    insulin lispro (HUMALOG) injection   SubCUTAneous AC&HS    glucose chewable tablet 16 g  4 Tab Oral PRN    glucagon (GLUCAGEN) injection 1 mg  1 mg IntraMUSCular PRN    dextrose 10% infusion 0-250 mL  0-250 mL IntraVENous PRN    oxyCODONE IR (ROXICODONE) tablet 5 mg  5 mg Oral Q4H PRN    lactobac ac& pc-s.therm-b.anim (ANGELIQUE Q/RISAQUAD)  1 Cap Oral DAILY    hydrALAZINE (APRESOLINE) 20 mg/mL injection 10 mg  10 mg IntraVENous Q6H PRN    metoprolol tartrate (LOPRESSOR) tablet 12.5 mg  12.5 mg Oral BID    sodium chloride (NS) flush 5-40 mL  5-40 mL IntraVENous Q8H    sodium chloride (NS) flush 5-40 mL 5-40 mL IntraVENous PRN    acetaminophen (TYLENOL) tablet 650 mg  650 mg Oral Q4H PRN    naloxone (NARCAN) injection 0.4 mg  0.4 mg IntraVENous PRN    diphenhydrAMINE (BENADRYL) injection 12.5 mg  12.5 mg IntraVENous Q4H PRN    ondansetron (ZOFRAN) injection 4 mg  4 mg IntraVENous Q4H PRN    bisacodyL (DULCOLAX) tablet 5 mg  5 mg Oral DAILY PRN    enoxaparin (LOVENOX) injection 40 mg  40 mg SubCUTAneous Q24H    aspirin chewable tablet 81 mg  81 mg Oral DAILY    morphine injection 2 mg  2 mg IntraVENous Q4H PRN    labetaloL (NORMODYNE;TRANDATE) injection 10 mg  10 mg IntraVENous Q4H PRN     ______________________________________________________________________  EXPECTED LENGTH OF STAY: 1d 16h  ACTUAL LENGTH OF STAY:          Tommy Wade MD

## 2020-05-30 NOTE — CONSULTS
[address]    Name:  Lyn Toledo  MR#:  [id number]  :  1945  ACCOUNT #:  [de-identified]  DATE OF SERVICE:  2020    BEHAVIORAL HEALTH CONSULTATION    REASON FOR CONSULTATION:  Agitation. HISTORY OF PRESENT ILLNESS:  The patient is a 78-year-old male who is currently being seen in the medical unit for psychiatric consultation for complaint mentioned above. He is calm and pleasant during the interview. He tells me that he is currently at [facility] because of chest pain. His past medical history is significant for DENG, diabetes, CVA. He tells me that he has suffered from chronic depression and anxiety for many, many years. His neurologist is currently prescribing his Effexor. He states that his mood is usually down but not to the point of being hopeless and helpless, and he never developed thoughts of hurting himself. He also reports that he is always anxious, and he relates this to his chronic depression. He denies history of suicide attempt. He states that his depression is controlled with Effexor. Yesterday, he was agitated. He was requesting to leave against medical advice. He was angry and was trying to get up and leave. He was very frustrated, \" code atlas had to be called, police had to restrain him\", and he was given Haldol 2 mg IV. He remembers the incident. He tells me that he got very angry because he was requesting to be discharged, but he states that after the hospitalist spoke to him, he decided to stay and agreed to stay for a few more days to get physically better. He states that he is aware that being discharged would probably not benefit him. He denies suicidal ideation, homicidal ideation, auditory or visual hallucination. I spoke to his primary nurse, and primary nurse said that he has been calm and pleasant today. There has been no agitative behaviors.     PAST MEDICAL HISTORY:  See H and P.    Labs: (reviewed/updated 5/30/2020)  Patient Vitals for the past 8 hrs:   BP Temp Pulse Resp SpO2   05/30/20 1349 103/69 98.6 °F (37 °C) 81 16 91 %   05/30/20 0838 126/76 98.8 °F (37.1 °C) 80 16 90 %     Labs Reviewed   CULTURE, URINE - Abnormal; Notable for the following components:       Result Value    Culture result: Staphylococcus simulans (OXACILLIN RESISTANT) (*)     All other components within normal limits   CBC WITH AUTOMATED DIFF - Abnormal; Notable for the following components:    HGB 11.3 (*)     MCH 25.3 (*)     RDW 22.0 (*)     PLATELET 260 (*)     MONOCYTES 15 (*)     All other components within normal limits   METABOLIC PANEL, COMPREHENSIVE - Abnormal; Notable for the following components:    Chloride 109 (*)     CO2 20 (*)     Glucose 189 (*)     GFR est non-AA 57 (*)     AST (SGOT) 57 (*)     Alk.  phosphatase 220 (*)     Albumin 2.6 (*)     Globulin 4.3 (*)     A-G Ratio 0.6 (*)     All other components within normal limits   URINALYSIS W/ REFLEX CULTURE - Abnormal; Notable for the following components:    Appearance TURBID (*)     Leukocyte Esterase SMALL (*)     Epithelial cells MODERATE (*)     Bacteria 4+ (*)     UA:UC IF INDICATED URINE CULTURE ORDERED (*)     All other components within normal limits   D DIMER - Abnormal; Notable for the following components:    D-dimer 0.70 (*)     All other components within normal limits   LIPID PANEL - Abnormal; Notable for the following components:    Triglyceride 153 (*)     All other components within normal limits   POC EG7 - Abnormal; Notable for the following components:    pO2 (POC) 75 (*)     All other components within normal limits   METABOLIC PANEL, BASIC - Abnormal; Notable for the following components:    Sodium 135 (*)     Glucose 275 (*)     BUN/Creatinine ratio 11 (*)     All other components within normal limits   METABOLIC PANEL, BASIC - Abnormal; Notable for the following components:    Sodium 135 (*)     Glucose 186 (*)     Calcium 8.4 (*)     All other components within normal limits   HEMOGLOBIN A1C WITH EAG - Abnormal; Notable for the following components:    Hemoglobin A1c 7.4 (*)     All other components within normal limits   METABOLIC PANEL, BASIC - Abnormal; Notable for the following components:    Sodium 133 (*)     Glucose 173 (*)     GFR est non-AA 55 (*)     All other components within normal limits   CBC WITH AUTOMATED DIFF - Abnormal; Notable for the following components:    MCH 25.6 (*)     RDW 20.7 (*)     PLATELET 525 (*)     MONOCYTES 15 (*)     All other components within normal limits   GLUCOSE, POC - Abnormal; Notable for the following components:    Glucose (POC) 219 (*)     All other components within normal limits   GLUCOSE, POC - Abnormal; Notable for the following components:    Glucose (POC) 273 (*)     All other components within normal limits   GLUCOSE, POC - Abnormal; Notable for the following components:    Glucose (POC) 166 (*)     All other components within normal limits   GLUCOSE, POC - Abnormal; Notable for the following components:    Glucose (POC) 254 (*)     All other components within normal limits   GLUCOSE, POC - Abnormal; Notable for the following components:    Glucose (POC) 281 (*)     All other components within normal limits   GLUCOSE, POC - Abnormal; Notable for the following components:    Glucose (POC) 205 (*)     All other components within normal limits   GLUCOSE, POC - Abnormal; Notable for the following components:    Glucose (POC) 150 (*)     All other components within normal limits   GLUCOSE, POC - Abnormal; Notable for the following components:    Glucose (POC) 242 (*)     All other components within normal limits   GLUCOSE, POC - Abnormal; Notable for the following components:    Glucose (POC) 218 (*)     All other components within normal limits   GLUCOSE, POC - Abnormal; Notable for the following components:    Glucose (POC) 234 (*)     All other components within normal limits   GLUCOSE, POC - Abnormal; Notable for the following components:    Glucose (POC) 181 (*)     All other components within normal limits   GLUCOSE, POC - Abnormal; Notable for the following components:    Glucose (POC) 245 (*)     All other components within normal limits   GLUCOSE, POC - Abnormal; Notable for the following components:    Glucose (POC) 190 (*)     All other components within normal limits   GLUCOSE, POC - Abnormal; Notable for the following components:    Glucose (POC) 239 (*)     All other components within normal limits   GLUCOSE, POC - Abnormal; Notable for the following components:    Glucose (POC) 128 (*)     All other components within normal limits   GLUCOSE, POC - Abnormal; Notable for the following components:    Glucose (POC) 163 (*)     All other components within normal limits   SAMPLES BEING HELD   MAGNESIUM   CK W/ REFLX CKMB   TROPONIN I   NT-PRO BNP   SARS-COV-2   DRUG SCREEN, URINE   TSH 3RD GENERATION   ETHYL ALCOHOL   TROPONIN I   TROPONIN I   AMMONIA     Lab Results   Component Value Date/Time    Sodium 133 (L) 05/29/2020 05:02 AM    Potassium 4.1 05/29/2020 05:02 AM    Chloride 105 05/29/2020 05:02 AM    CO2 21 05/29/2020 05:02 AM    Anion gap 7 05/29/2020 05:02 AM    Glucose 173 (H) 05/29/2020 05:02 AM    BUN 18 05/29/2020 05:02 AM    Creatinine 1.27 05/29/2020 05:02 AM    BUN/Creatinine ratio 14 05/29/2020 05:02 AM    GFR est AA >60 05/29/2020 05:02 AM    GFR est non-AA 55 (L) 05/29/2020 05:02 AM    Calcium 8.7 05/29/2020 05:02 AM    Bilirubin, total 0.2 05/24/2020 01:49 AM    Alk. phosphatase 220 (H) 05/24/2020 01:49 AM    Protein, total 6.9 05/24/2020 01:49 AM    Albumin 2.6 (L) 05/24/2020 01:49 AM    Globulin 4.3 (H) 05/24/2020 01:49 AM    A-G Ratio 0.6 (L) 05/24/2020 01:49 AM    ALT (SGPT) 48 05/24/2020 01:49 AM     No results displayed because visit has over 200 results.         Vitals:    05/29/20 2021 05/30/20 0156 05/30/20 0838 05/30/20 1349   BP: 131/67 122/75 126/76 103/69   Pulse: 87 81 80 81 Resp: 16 16 16 16   Temp: 97.9 °F (36.6 °C) 97.6 °F (36.4 °C) 98.8 °F (37.1 °C) 98.6 °F (37 °C)   SpO2: 92% 93% 90% 91%   Weight:       Height:         Recent Results (from the past 24 hour(s))   GLUCOSE, POC    Collection Time: 05/29/20  5:06 PM   Result Value Ref Range    Glucose (POC) 190 (H) 65 - 100 mg/dL    Performed by Lc Hay, POC    Collection Time: 05/29/20  9:52 PM   Result Value Ref Range    Glucose (POC) 239 (H) 65 - 100 mg/dL    Performed by LASHON GRAVES    GLUCOSE, POC    Collection Time: 05/30/20  6:16 AM   Result Value Ref Range    Glucose (POC) 128 (H) 65 - 100 mg/dL    Performed by LASHON GRAVES    GLUCOSE, POC    Collection Time: 05/30/20 12:04 PM   Result Value Ref Range    Glucose (POC) 163 (H) 65 - 100 mg/dL    Performed by Leda 20:  Results from Hospital Encounter encounter on 05/24/20   XR ABD (KUB)    Narrative INDICATION: vomiting, cp. EXAM: SINGLE VIEW ABDOMEN RADIOGRAPH. COMPARISON: None. FINDINGS: A supine radiograph of the abdomen shows a nonspecific bowel gas  pattern, with small amounts of gas scattered throughout small and large bowel. Possible ileus. Moderate retained fecal material. No soft tissue masses or  pathologic calcifications are identified. The bones and soft tissues are within  normal limits. .      Impression IMPRESSION: Non-obstructive bowel gas pattern. Jhon Floras Shoulder Lt Ap/lat Min 2 V    Result Date: 5/13/2020  EXAM: XR SHOULDER LT AP/LAT MIN 2 V INDICATION: Left shoulder pain since ground-level fall off bed last night. COMPARISON: Left shoulder views on 4/20/2020. FINDINGS: 3 views of the left shoulder demonstrate no fracture, dislocation or other acute abnormality. Mild acromioclavicular osteoarthritis is unchanged. Glenohumeral joint is within normal limits and unchanged. No evidence of scapular fracture. IMPRESSION: No fracture. No change since last month.     Xr Shoulder Lt Ap/lat Min 2 V    Result Date: 4/20/2020  EXAM: XR SHOULDER LT AP/LAT MIN 2 V INDICATION: fall. COMPARISON: None. FINDINGS: Three views of the left shoulder demonstrate no fracture, dislocation or other acute abnormality. There is left shoulder DJD. IMPRESSION: No acute abnormality. Xr Ankle Lt Min 3 V    Result Date: 4/20/2020  EXAM: XR ANKLE LT MIN 3 V INDICATION: fall w swelling. COMPARISON: None. FINDINGS: Three views of the left ankle demonstrate no fracture or disruption of the ankle mortise. There is no other acute osseous or articular abnormality. The bones are osteopenic, and there is ankle DJD. Examination is limited by difficulty with patient cooperation. IMPRESSION: No acute abnormality. Xr Foot Lt Min 3 V    Result Date: 4/20/2020  Billing note: The Facility order (procedure) was incorrect at the time of interpretation and signature of this exam.? This discrepancy may have been corrected after final signature. EXAM: 2 views of the left foot INDICATION: fall w pain. COMPARISON: None. FINDINGS: 2 views of the left foot demonstrate an acute obliquely oriented nondisplaced fracture of the mid to distal shaft of the fifth metatarsal. The bones are osteopenic and there are degenerative changes. Examination is limited due to difficulty with patient positioning. IMPRESSION: Acute nondisplaced fracture of the fifth metatarsal.    Xr Abd (kub)    Result Date: 5/24/2020  INDICATION: vomiting, cp. EXAM: SINGLE VIEW ABDOMEN RADIOGRAPH. COMPARISON: None. FINDINGS: A supine radiograph of the abdomen shows a nonspecific bowel gas pattern, with small amounts of gas scattered throughout small and large bowel. Possible ileus. Moderate retained fecal material. No soft tissue masses or pathologic calcifications are identified. The bones and soft tissues are within normal limits. .     IMPRESSION: Non-obstructive bowel gas pattern.  .     Ct Head Wo Cont    Result Date: 4/28/2020  EXAM: CT HEAD WO CONT INDICATION: Headache, Dizziness, vomiting, and abdominal pain for 2 weeks. COMPARISON: CT head on 4/20/2020 and 3/5/2019. TECHNIQUE: Noncontrast head CT. Coronal and sagittal reformats. CT dose reduction was achieved through the use of a standardized protocol tailored for this examination and automatic exposure control for dose modulation. Adaptive statistical iterative reconstruction (ASIR) was utilized. FINDINGS: The asymmetric right cerebral volume loss is unchanged without hydrocephalus. There is no mass effect or midline shift. There is no intracranial hemorrhage or extra-axial fluid collection. Old right basal ganglia infarct is unchanged. No CT evidence of acute infarct. The calvarium is intact. The visualized paranasal sinuses and mastoid air cells are clear. IMPRESSION: No acute intracranial abnormality on this noncontrast head CT. No change since last week. Ct Head Wo Cont    Result Date: 4/20/2020  Indication:  fall w head injury Comparison: CT March 2019 Findings: 5 mm axial images were obtained from the skull base through the vertex. CT dose reduction was achieved through the use of a standardized protocol tailored for this examination and automatic exposure control for dose modulation. There is chronic ventriculomegaly. There is no evidence of intracranial hemorrhage, mass, mass effect, or acute infarct. There is periventricular white matter disease. No extra-axial fluid collections are seen. The visualized paranasal sinuses and mastoid air cells are clear. The orbital structures are unremarkable. No osseous abnormalities are seen. Impression: 1. No evidence of acute infarct or intracranial hemorrhage. 2. Periventricular white matter disease is likely secondary to chronic small vessel ischemic changes. 3. Chronic ventriculomegaly. Ct Spine Cerv Wo Cont    Result Date: 4/20/2020  INDICATION:   fall w head injury and neck pain COMPARISON: None.  TECHNIQUE:   Noncontrast axial CT imaging of the cervical spine was performed. Coronal and sagittal reconstructions were obtained. CT dose reduction was achieved through the use of a standardized protocol tailored for this examination and automatic exposure control for dose modulation. FINDINGS: There is no evidence of acute osseous abnormality. There is no acute alignment abnormality. Vertebral body heights are maintained. There is no appreciable prevertebral soft tissue swelling or epidural hematoma. There is multilevel degenerative spondylosis. There is multilevel bilateral neuroforaminal stenosis. Evaluation of the paraspinal soft tissues demonstrates no significant pathology. The visualized lung apices are clear. IMPRESSION: 1. No acute osseous abnormality. 2. Multilevel degenerative spondylosis. Ct Abd Pelv Wo Cont    Result Date: 4/28/2020  CT ABDOMEN AND PELVIS WITHOUT CONTRAST. 4/28/2020 2:01 PM INDICATION: Abdominal pain, vomiting. COMPARISON: 4/20/2020, 3/6/2019. TECHNIQUE: CT of the abdomen and pelvis was performed without contrast. CT dose reduction was achieved through use of a standardized protocol tailored for this examination and automatic exposure control for dose modulation. Adaptive statistical iterative reconstruction (ASIR) was utilized. FINDINGS: Inferior chest: There is subpleural fibrosis in the lung bases. No significant honeycombing to suggest idiopathic pulmonary fibrosis/usual interstitial pneumonitis (IPF/UIP). The heart size is normal. Aortic valve calcifications and coronary calcifications are moderate. Abdomen: The liver is cirrhotic, with surface nodularity, shrunken morphology, widening of the fissures, and hypertrophy of the caudate. Portal hypertension is evidenced by trace ascites and recanalization of the umbilical vein. Post cholecystectomy. Incidental note is made of small bilateral renal cysts. The unenhanced distal esophagus, stomach, duodenum, pancreas, spleen, adrenals, and kidneys are normal. Pelvis:  The unenhanced small bowel, ileocecal junction, colon, and bladder are normal. The appendix is not visualized. No free air and no abdominopelvic lymphadenopathy. IMPRESSION: 1. Cirrhosis and portal hypertension. Trace ascites. 2. Subpleural fibrosis in the lung bases. No honeycombing to suggest IPF/UIP. Ct Abd Pelv Wo Cont    Result Date: 4/20/2020  EXAM: CT ABD PELV WO CONT INDICATION: fall w lumbar pain and tenderness, patient also reports abdominal pain COMPARISON: CT March 2019 CONTRAST:  None. TECHNIQUE: Thin axial images were obtained through the abdomen and pelvis. Coronal and sagittal reconstructions were generated. Oral contrast was not administered. CT dose reduction was achieved through use of a standardized protocol tailored for this examination and automatic exposure control for dose modulation. The absence of intravenous contrast material reduces the sensitivity for evaluation of the vasculature and solid organs. FINDINGS: LOWER THORAX: There is very mild bibasilar atelectasis and interstitial lung disease, appearing chronic. Right middle lobe pulmonary nodule is unchanged. Extensive coronary artery calcifications are noted. LIVER: Nodular contour BILIARY TREE: Status post cholecystectomy. CBD is not dilated. SPLEEN: within normal limits. PANCREAS: No focal abnormality. ADRENALS: Unremarkable. KIDNEYS/URETERS: No calculus or hydronephrosis. STOMACH: Unremarkable. SMALL BOWEL: No dilatation or wall thickening. COLON: No dilatation or wall thickening. APPENDIX: Grossly normal PERITONEUM: No ascites or pneumoperitoneum. RETROPERITONEUM: No lymphadenopathy or aortic aneurysm. REPRODUCTIVE ORGANS: Prostate is noted. URINARY BLADDER: No mass or calculus. BONES: Severe multilevel degenerative spondylosis throughout the spine. No acute osseous abnormality. ABDOMINAL WALL: No mass or hernia. ADDITIONAL COMMENTS: N/A     IMPRESSION: No acute abdominal or pelvic pathology.     Xr Chest Port    Result Date: 5/24/2020  EXAM:  XR CHEST PORT INDICATION:  cp COMPARISON:  2020 FINDINGS: A portable AP radiograph of the chest was obtained at 340 hours. The patient is on a cardiac monitor. There is slight increasing basilar haziness/atelectasis without definite evidence of focal airspace process. ... Cardiomegaly is stable. Bony structures are unchanged. IMPRESSION: Depth of inspiration is shallow. There is slight increasing bibasilar haziness/atelectasis which may represent an element of interstitial edema or possibly atypical pneumonia. Xr Chest Port    Result Date: 4/28/2020  EXAM: XR CHEST PORT INDICATION: Abdominal pain, dizziness, and vomiting for 2 weeks. COMPARISON: Portable chest on 4/20/2020. TECHNIQUE: Semiupright portable chest AP view FINDINGS: Large cardiac silhouette is accentuated by technique. Aortic arch is not enlarged. The pulmonary vasculature is within normal limits. Lung volumes are low. No evidence of pneumonia or pneumothorax. Positioning is lordotic. Bones are osteopenic. The upper abdomen is gasless. IMPRESSION: No acute process on limited portable chest view. Slight improved lung aeration since last week. Xr Chest Port    Result Date: 4/20/2020  INDICATION:  fall FINDINGS: Single AP portable view of the chest obtained at 00 28 demonstrates a normal cardiomediastinal silhouette. The lungs are hypoinspiratory bilaterally. There is no focal airspace disease. No osseous abnormalities are seen. IMPRESSION: Hypoinspiratory exam. No acute process. Xr Ribs Lt W Pa Cxr Min 3 V    Result Date: 5/13/2020  EXAM:  XR RIBS LT W PA CXR MIN 3 V INDICATION: Pain in the left chest wall after fall from bed last night. COMPARISON: Portable chest on 4/28/2020. TECHNIQUE: PA chest and 3 views of the left ribs FINDINGS: The cardiomegaly is unchanged. Aortic arch is not enlarged. The lungs and pleural spaces are clear bilaterally. The bones and upper abdomen are within normal limits. No evidence of rib fracture. IMPRESSION: No displaced rib fracture or pneumothorax. No change given difference in technique. PAST PSYCHIATRIC HISTORY:  He was admitted in a psychiatric facility 30 years ago when he was living in [place] because of \"medication changes. \"  He states that he has a longstanding diagnosis of depression and anxiety. His neurologist is currently prescribing his Effexor. PSYCHOSOCIAL HISTORY:  He is . He has 3 children. MENTAL STATUS EXAMINATION:  He is alert and oriented in all spheres, but has periods of confusion throughout. He is calm and pleasant during the interview. He reports his mood is okay. Affect is blunted. Speech normal rate and rhythm. Thought process logical and goal-directed. He denies suicidal ideation, homicidal ideation, auditory or visual hallucination. Insight is poor. Judgment is poor. Memory is impaired. ASSESSMENT AND PLAN:  The patient meets the criteria for persistent depressive disorder. Continue Effexor at current dosing. I will start him on Seroquel 25 mg 3 times a day as needed for agitation. Thank you for this consult. Please call with questions.       BRETT SANCHEZ NP      [id number]  D:  05/29/2020 16:57  T:  05/29/2020 18:58  JOB #:  [id number]

## 2020-05-30 NOTE — PROGRESS NOTES
Hospital to [facility] [address] [place] [address]                                                                        75 y.o.   male    [address]   Room: [address] [place] [address]  Unit Phone# :  [phone]      [address]  [address] [address]  Dept: [address]: [phone]                    SITUATION     Admitted:  5/24/2020         Attending Provider:  No att. providers found       Consultations:  IP CONSULT TO HOSPITALIST  IP CONSULT TO CARDIOLOGY  IP CONSULT TO NEUROLOGY  IP CONSULT TO PSYCHIATRY    PCP:  None   None    Treatment Team: Consulting Provider: Danilo Recinos MD; Consulting Provider: Con Moore MD; Utilization Review: Nika Aj RN; Care Manager: Isaiah Quiñones; Consulting Provider: Kely Hodge NP    Admitting Dx:  Chest pain [R07.9]  AMS (altered mental status) [R41.82]       Principal Problem: Chest pain    * No surgery found * of      BY: * Surgery not found *             ON: * No surgery found *                  Code Status: Full Code                Advance Directives:   Advance Care Planning 3/6/2019   Patient's Healthcare Decision Maker is: Unable to obtain   Confirm Advance Directive None   Patient Would Like to Complete Advance Directive No   Does the patient have other document types Other (comment)    (Send w/patient)   Yes Not W Pt       Isolation:  There are currently no Active Isolations       MDRO: No current active infections    Pain Medications given:  oxy 5mg    Last dose: 5/30/2020 at  [address] needed: no  Type of equipment:       BACKGROUND     Allergies:   Allergies   Allergen Reactions    Iodine Other (comments)     Pt states his creatinine level increases when he gets IV contrast       Past Medical History:   Diagnosis Date    [facility] ([facility] [address]     Gastrointestinal disorder     IBS    Hypertension     Psychiatric disorder     depression, anxiety  Sleep disorder     DENG, CPAP @ night    Stroke ([facility] [address] 1993    L sided deficit       Past Surgical History:   Procedure Laterality Date    HX CHOLECYSTECTOMY      HX KNEE REPLACEMENT      Pt stated    NEUROLOGICAL PROCEDURE UNLISTED         No medications prior to admission. Hard scripts included in transfer packet yes    Vaccinations: There is no immunization history on file for this patient. Readmission Risks:    Known Risks: The Charlson [employer] Index tool is an evidenced based tool that has more automatic generated information. The tool looks at many different items such as the age of the patient, how many times they were admitted in the last calendar year, current length of stay in the hospital and their diagnosis. All of these items are pulled automatically from information documented in the chart from various places and will generate a score that predicts whether a patient is at low (less than 13), medium (13-20) or high (21 or greater) risk of being readmitted.         ASSESSMENT                Temp: 98.6 °F (37 °C) (05/30/20 1349) Pulse (Heart Rate): 81 (05/30/20 1349)     Resp Rate: 16 (05/30/20 1349)           BP: 103/69 (05/30/20 1349)     O2 Sat (%): 91 % (05/30/20 1349)     Weight: 104 kg (229 lb 4.5 oz)    Height: 5' 7\" (170.2 cm) (05/27/20 6924)       If above not within 1 hour of discharge:    BP:_____  P:____  R:____ T:_____ O2 Sat: ___%  O2: ______    Active Orders   Diet    DIET REGULAR         Orientation: only aware of  place, person and situation     Active Behaviors: None                                   Active Lines/Drains:  (Peg Tube / Martinez / CL or S/L?): no    Urinary Status: Voiding, Incontinent     Last BM: Last Bowel Movement Date: 05/26/20     Skin Integrity: Abrasion, Excoriation(scabs on ABD, red bottom)             Mobility: Slightly limited   Weight Bearing Status: WBAT (Weight Bearing as Tolerated)      Gait Training  Assistive Device: Arcadio Guzmán rolling, Gait belt  Ambulation - Level of Assistance: Contact guard assistance  Distance (ft): 6 Feet (ft)         Lab Results   Component Value Date/Time    Glucose 173 (H) 05/29/2020 05:02 AM    Hemoglobin A1c 7.4 (H) 05/27/2020 01:36 AM    INR 1.1 03/05/2019 10:38 PM    HGB 12.7 05/29/2020 05:02 AM    HGB 11.3 (L) 05/24/2020 01:49 AM        RECOMMENDATION     See After Visit Summary (AVS) for:  · Discharge instructions  · After [address]   · Special equipment needed (entered pre-discharge by Care Management)  · Medication Reconciliation    · Follow up Appointment(s)         Report given/sent by:  Sherie Renteria                    Verbal report given to: Shira Jaquez at [facility]  FAXED to:           Estimated discharge time:  5/30/2020 at 1600

## 2020-05-30 NOTE — PROGRESS NOTES
Bedside and Verbal shift change report given to [facility] SHANNA CLARK (oncoming nurse) by Karson Erickson (offgoing nurse). Report included the following information SBAR, Kardex and Intake/Output.

## 2020-05-30 NOTE — PROGRESS NOTES
[address] [facility]  Hospitalist Group                                                                                          Hospitalist Progress Note  Vladislav Wang MD  Answering service: [phone] OR [phone] from in house phone        Date of Service:  2020  NAME:  Jacy Nuno  :  1945  MRN:  [id number]      Admission Summary:   75year old male with past medical history DENG, diabetes, CVA with left sided deficits presenting to [facility] via EMS with chest pain that started at 2200 and given aspirin 324 mg. Patient poor historian and history obtained at 5 am. Currently, patient difficult to arouse but does state that his chest pain is centralized. Unable to give further details including pain scale, radiation, descriptive features. Per ED physician, patient endorsed nonproductive cough, nausea without vomiting. Denied fevers, chills. In the ED, chest x-ray with slight increasing bibasilar haziness/atelectasis, may represent interstitial edema or possibly atypical pneumonia. EKG without ST changes. Troponin wnl, D-dimer 0.7. Hospitalist consulted for further evaluation/admission.      Interval history / Subjective:   F/U  UTI, AMS  Today much more calm       SIRS- COV 2 test negative from   Assessment & Plan:     Chest pain  resolved  -No ischemic changes on EKG, normal troponin  -Cardiology evaluated the patient, no further work-up planned     Acute encephalopathy  Improved today  -Acute metabolic encephalopathy likely from infectious process  -Continue monitor  -Possible baseline dementia    Urinary tract infection  -Urine culture grew Staphylococcus simulans  -Change antibiotic doxycycline from IV to PO, will stop antibiotics tomorrow     Interstitial edema  -COVID-19 test negative  -No prior history of congestive heart failure  -saturating well on room air  - echocardiogram EF 60-65% with no RWMA    Diabetes  -Diabetes with hyperglycemia  -Not on home medications  - hemoglobin A1c 7.4, continue sliding scale insulin coverage, start lantus    Lt UE tremor  Neurology consulted, appreciate input  Nonphysiological tremor, DC primidone    History of CVA  -Continue aspirin      Hypothyroidism  -TSH  -We will need to resume Synthroid once pharmacy confirmed the dose    Depression  -Resume Effexor 150 mg daily    Agitation  -Haldol  -Consult Psychiatry    PT/OT SNF     Code status: Full  DVT prophylaxis: Lovenox  PTA: Home lives alone    Plan: The patient is now agreeing on going to rehab. Spoke to CM, she will talk to the family and arrange it. The patient is medically stable    Care Plan discussed with: Patient/Family and Nurse  Anticipated Disposition:SNF  Anticipated Discharge: 24 hours to 48 hours     Hospital Problems  Date Reviewed: 5/28/2020          Codes Class Noted POA    AMS (altered mental status) ICD-10-CM: R41.82  ICD-9-CM: 780.97  5/25/2020 Unknown        * (Principal) Chest pain ICD-10-CM: R07.9  ICD-9-CM: 786.50  5/24/2020 Yes                Review of Systems:   Review of systems not obtained due to patient factors. Vital Signs:    Last 24hrs VS reviewed since prior progress note. Most recent are:  Visit Vitals  /67 (BP 1 Location: Right arm, BP Patient Position: At rest)   Pulse 87   Temp 97.9 °F (36.6 °C)   Resp 16   Ht 5' 7\" (1.702 m)   Wt 104 kg (229 lb 4.5 oz)   SpO2 92%   BMI 35.91 kg/m²         Intake/Output Summary (Last 24 hours) at 5/29/2020 2242  Last data filed at 5/29/2020 1148  Gross per 24 hour   Intake    Output 400 ml   Net -400 ml        Physical Examination:             Constitutional:  No acute distress   ENT:  Oral mucosa moist, oropharynx benign. Resp:  CTA bilaterally. No wheezing/rhonchi/rales. No accessory muscle use   CV:  Regular rhythm, normal rate, no murmurs, gallops, rubs    GI:  Soft, non distended, non tender.  normoactive bowel sounds, no hepatosplenomegaly     Musculoskeletal:  No edema, warm, 2+ pulses throughout Neurologic:  Moves all extremities. Awake alert     Psych:  Good insight, Not anxious nor agitated. Data Review:    I personally reviewed  Image and labs      Labs:     Recent Labs     05/29/20  0502   WBC 6.3   HGB 12.7   HCT 41.7   *     Recent Labs     05/29/20  0502 05/27/20  0129   * 135*   K 4.1 3.7    106   CO2 21 24   BUN 18 12   CREA 1.27 1.01   * 186*   CA 8.7 8.4*     No results for input(s): ALT, AP, TBIL, TBILI, TP, ALB, GLOB, GGT, AML, LPSE in the last 72 hours. No lab exists for component: SGOT, GPT, AMYP, HLPSE  No results for input(s): INR, PTP, APTT, INREXT, INREXT in the last 72 hours. No results for input(s): FE, TIBC, PSAT, FERR in the last 72 hours. No results found for: FOL, RBCF   No results for input(s): PH, PCO2, PO2 in the last 72 hours. No results for input(s): CPK, CKNDX, TROIQ in the last 72 hours.     No lab exists for component: CPKMB  Lab Results   Component Value Date/Time    Cholesterol, total 173 05/24/2020 08:56 AM    HDL Cholesterol 43 05/24/2020 08:56 AM    LDL, calculated 99.4 05/24/2020 08:56 AM    Triglyceride 153 (H) 05/24/2020 08:56 AM    CHOL/HDL Ratio 4.0 05/24/2020 08:56 AM     Lab Results   Component Value Date/Time    Glucose (POC) 239 (H) 05/29/2020 09:52 PM    Glucose (POC) 190 (H) 05/29/2020 05:06 PM    Glucose (POC) 245 (H) 05/29/2020 11:18 AM    Glucose (POC) 181 (H) 05/29/2020 06:00 AM    Glucose (POC) 234 (H) 05/28/2020 08:50 PM     Lab Results   Component Value Date/Time    Color YELLOW/STRAW 05/24/2020 08:56 AM    Appearance TURBID (A) 05/24/2020 08:56 AM    Specific gravity 1.020 05/24/2020 08:56 AM    pH (UA) 7.0 05/24/2020 08:56 AM    Protein Negative 05/24/2020 08:56 AM    Glucose Negative 05/24/2020 08:56 AM    Ketone Negative 05/24/2020 08:56 AM    Bilirubin Negative 05/24/2020 08:56 AM    Urobilinogen 1.0 05/24/2020 08:56 AM    Nitrites Negative 05/24/2020 08:56 AM    Leukocyte Esterase SMALL (A) 05/24/2020 08:56 AM    Epithelial cells MODERATE (A) 05/24/2020 08:56 AM    Bacteria 4+ (A) 05/24/2020 08:56 AM    WBC 10-20 05/24/2020 08:56 AM    RBC 0-5 05/24/2020 08:56 AM         Medications Reviewed:     Current Facility-Administered Medications   Medication Dose Route Frequency    [START ON 5/30/2020] pantoprazole (PROTONIX) tablet 40 mg  40 mg Oral ACB    QUEtiapine (SEROquel) tablet 25 mg  25 mg Oral Q6H PRN    benzonatate (TESSALON) capsule 100 mg  100 mg Oral TID PRN    oxybutynin chloride XL (DITROPAN XL) tablet 5 mg  5 mg Oral DAILY    finasteride (PROSCAR) tablet 5 mg  5 mg Oral DAILY    haloperidol lactate (HALDOL) injection 2 mg  2 mg IntraVENous Q6H PRN    metFORMIN (GLUCOPHAGE) tablet 500 mg  500 mg Oral BID WITH MEALS    levothyroxine (SYNTHROID) tablet 50 mcg  50 mcg Oral 6am    tamsulosin (FLOMAX) capsule 0.4 mg  0.4 mg Oral DAILY    ferrous sulfate tablet 325 mg  1 Tab Oral DAILY WITH BREAKFAST    atorvastatin (LIPITOR) tablet 10 mg  10 mg Oral QHS    venlafaxine-SR (EFFEXOR-XR) capsule 150 mg  150 mg Oral DAILY WITH BREAKFAST    famotidine (PEPCID) tablet 20 mg  20 mg Oral DAILY    insulin glargine (LANTUS) injection 10 Units  10 Units SubCUTAneous DAILY    ondansetron (ZOFRAN ODT) tablet 4 mg  4 mg Oral Q8H PRN    balsam peru-castor oiL (VENELEX) ointment   Topical Q8H    meclizine (ANTIVERT) tablet 25 mg  25 mg Oral Q6H PRN    prochlorperazine (COMPAZINE) with saline injection 10 mg  10 mg IntraVENous Q6H PRN    insulin lispro (HUMALOG) injection   SubCUTAneous AC&HS    glucose chewable tablet 16 g  4 Tab Oral PRN    glucagon (GLUCAGEN) injection 1 mg  1 mg IntraMUSCular PRN    dextrose 10% infusion 0-250 mL  0-250 mL IntraVENous PRN    oxyCODONE IR (ROXICODONE) tablet 5 mg  5 mg Oral Q4H PRN    lactobac ac& pc-s.therm-b.anim (ANGELIQUE Q/RISAQUAD)  1 Cap Oral DAILY    hydrALAZINE (APRESOLINE) 20 mg/mL injection 10 mg  10 mg IntraVENous Q6H PRN    metoprolol tartrate (LOPRESSOR) tablet 12.5 mg  12.5 mg Oral BID    sodium chloride (NS) flush 5-40 mL  5-40 mL IntraVENous Q8H    sodium chloride (NS) flush 5-40 mL  5-40 mL IntraVENous PRN    acetaminophen (TYLENOL) tablet 650 mg  650 mg Oral Q4H PRN    naloxone (NARCAN) injection 0.4 mg  0.4 mg IntraVENous PRN    diphenhydrAMINE (BENADRYL) injection 12.5 mg  12.5 mg IntraVENous Q4H PRN    ondansetron (ZOFRAN) injection 4 mg  4 mg IntraVENous Q4H PRN    bisacodyL (DULCOLAX) tablet 5 mg  5 mg Oral DAILY PRN    enoxaparin (LOVENOX) injection 40 mg  40 mg SubCUTAneous Q24H    aspirin chewable tablet 81 mg  81 mg Oral DAILY    morphine injection 2 mg  2 mg IntraVENous Q4H PRN    labetaloL (NORMODYNE;TRANDATE) injection 10 mg  10 mg IntraVENous Q4H PRN     ______________________________________________________________________  EXPECTED LENGTH OF STAY: 1d 16h  ACTUAL LENGTH OF STAY:          4                 Juan Alexander MD

## 2020-05-30 NOTE — DISCHARGE SUMMARY
Discharge Summary       PATIENT ID: Erendira Whitman  MRN: [id number]   YOB: 1945    DATE OF ADMISSION: 5/24/2020  1:33 AM    DATE OF DISCHARGE: 5/30/2020   PRIMARY CARE PROVIDER: None     ATTENDING PHYSICIAN: Dr Selin Catherine  DISCHARGING PROVIDER: Selin Catherine MD    To contact this individual call [phone] and ask the  to page. If unavailable ask to be transferred the Adult Hospitalist Department. CONSULTATIONS: IP CONSULT TO HOSPITALIST  IP CONSULT TO CARDIOLOGY  IP CONSULT TO NEUROLOGY  IP CONSULT TO PSYCHIATRY    PROCEDURES/SURGERIES: * No surgery found *    ADMITTING DIAGNOSES & HOSPITAL COURSE:   Chest pain  resolved  -No ischemic changes on EKG, normal troponin  -Cardiology evaluated the patient, no further work-up planned     Acute encephalopathy  Improved today  -Acute metabolic encephalopathy likely from infectious process  -Continue monitor  -Possible baseline dementia     Urinary tract infection  -Urine culture grew Staphylococcus simulans  -Change antibiotic doxycycline from IV to PO, will stop antibiotics tomorrow     Interstitial edema  -COVID-19 test negative  -No prior history of congestive heart failure  -saturating well on room air  - echocardiogram EF 60-65% with no RWMA     Diabetes  -Diabetes with hyperglycemia  -Not on home medications  - hemoglobin A1c 7.4, continue sliding scale insulin coverage/lantus     Lt UE tremor  Neurology consulted, appreciate input  Nonphysiological tremor, DC primidone     History of CVA  -Continue aspirin      Hypothyroidism  -TSH  -We will need to resume Synthroid once pharmacy confirmed the dose     Depression  -Resume Effexor 150 mg daily     Agitation/Dementia with behavioral disturbance  -Haldol  -Appreciate Psychiatry, started on seroquel prn     PT/OT SNF     Code status: Full  DVT prophylaxis: Lovenox  PTA: Home lives alone           DISCHARGE DIAGNOSES / PLAN:      1.   Chest pain       PENDING TEST RESULTS:   At the time of discharge the following test results are still pending: none    FOLLOW UP APPOINTMENTS:    Follow-up Information     Follow up With Specialties Details Why Contact Info    PCP  In 1 week             ADDITIONAL CARE RECOMMENDATIONS:   Follow up with PMD  The patient does not need CPAP for discharge. He can have it on Monday    DIET: Cardiac Diet    ACTIVITY: Activity as tolerated      DISCHARGE MEDICATIONS:   See Medication Reconciliation Form      NOTIFY YOUR PHYSICIAN FOR ANY OF THE FOLLOWING:   Fever over 101 degrees for 24 hours. Chest pain, shortness of breath, fever, chills, nausea, vomiting, diarrhea, change in mentation, falling, weakness, bleeding. Severe pain or pain not relieved by medications. Or, any other signs or symptoms that you may have questions about.     DISPOSITION:    Home With:   OT  PT  HH  RN      x SNF/Inpatient Rehab/LTAC    Independent/assisted living    Hospice    Other:       PATIENT CONDITION AT DISCHARGE:     Functional status    Poor    x Deconditioned     Independent      Cognition    x Lucid     Forgetful     Dementia      Catheters/lines (plus indication)    Martinez     PICC     PEG    x None      Code status    x Full code     DNR      PHYSICAL EXAMINATION AT DISCHARGE:  Please see progress note      CHRONIC MEDICAL DIAGNOSES:  Problem List as of 5/30/2020 Date Reviewed: 5/28/2020          Codes Class Noted - Resolved    AMS (altered mental status) ICD-10-CM: R41.82  ICD-9-CM: 780.97  5/25/2020 - Present        * (Principal) Chest pain ICD-10-CM: R07.9  ICD-9-CM: 786.50  5/24/2020 - Present        Altered mental status ICD-10-CM: R41.82  ICD-9-CM: 780.97  3/6/2019 - Present              Greater than 35 minutes were spent with the patient on counseling and coordination of care    Signed:   Sanket Alvares MD  5/30/2020  1:40 PM

## 2020-06-02 ENCOUNTER — EXTERNAL NURSING HOME DOCUMENTATION (OUTPATIENT)
Dept: INTERNAL MEDICINE CLINIC | Age: 75
End: 2020-06-02

## 2020-06-02 DIAGNOSIS — Z79.4 TYPE 2 DIABETES MELLITUS WITHOUT COMPLICATION, WITH LONG-TERM CURRENT USE OF INSULIN (HCC): ICD-10-CM

## 2020-06-02 DIAGNOSIS — N39.0 URINARY TRACT INFECTION WITHOUT HEMATURIA, SITE UNSPECIFIED: Primary | ICD-10-CM

## 2020-06-02 DIAGNOSIS — R07.9 CHEST PAIN, UNSPECIFIED TYPE: ICD-10-CM

## 2020-06-02 DIAGNOSIS — E11.9 TYPE 2 DIABETES MELLITUS WITHOUT COMPLICATION, WITH LONG-TERM CURRENT USE OF INSULIN (HCC): ICD-10-CM

## 2020-06-02 DIAGNOSIS — E03.9 ACQUIRED HYPOTHYROIDISM: ICD-10-CM

## 2020-06-02 DIAGNOSIS — I63.9 CEREBROVASCULAR ACCIDENT (CVA), UNSPECIFIED MECHANISM (HCC): ICD-10-CM

## 2020-06-02 DIAGNOSIS — F41.8 DEPRESSION WITH ANXIETY: ICD-10-CM

## 2020-06-02 NOTE — PROGRESS NOTES
History and Physical    History of Present Illness: This is a 74-year-old, obese, white male with past medical history significant for type 2 diabetes mellitus, CVA with left sided deficits, presented to [facility] emergency room with chest pain. Patient had a nonproductive cough and nausea without vomiting. In the ER patient had a chest x-ray done that increased bibasilar haziness, interstitial edema and possible atypical pneumonia. EKG without any sign of ischemia. Troponin was normal.  He was admitted to the hospital with diagnosis of acute encephalopathy, probably due to infection. He was found to have a urinary tract infection, started on IV antibiotics. Urine culture grew staphylococcus simulans. Antibiotic later on changed to PO Doxycycline. He had interstitial edema on the lung, he was suspected to have some pneumonia. COVID-19 test was negative. Echocardiogram was done, ejection fraction was 60-65% with no regional wall motion abnormality. He was noted to have left upper extremity tremor and neurology was consulted. They assumed it was a non physiological tremor. He had some agitation and behavior disturbance. He was seen by psychiatry, Haldol was started as needed. He was stabilized and transferred to [facility]. I am seeing him in the rehab, he is doing well, not in distress. Past Medical History:  Type 2 diabetes mellitus, IBS, hypertension, depression, anxiety, obstructive sleep apnea, on CPAP machine, stroke with left sided deficits. Past Surgical History:  Cholecystectomy, knee replacement surgery. Social History:  Patient resides at home. No history of smoking or drinking alcohol. Family History:   Not contributory.     Medications:  Patient is on Tylenol 650 mg every four hours as needed, aspirin 81 mg once a day, Lipitor 10 mg once every night, Venelex ointment topical every eight hours, iron sulfate 325 mg once every morning, Proscar 5 mg every day, Lantus insulin 10 units subcu daily, Janet-Q, one capsule every day, Synthroid 50 mcg once a day, Metformin 500 mg twice a day, Metoprolol 12.5 mg twice a day, Oxybutynin Chloride XL 5 mg once a day, Oxycodone IR 5 mg every four hours as needed, Flomax 0.4 mg once a day. Review of Systems:  Complete review of systems done, right now essentially negative. Objective:    GENERAL:  This is a pleasant  male, not in any apparent distress. VITALS:  T:  98.3 degrees Fahrenheit. P:  83 per minute. BP:   127/77 mmHg. SaO2:  98% on room air. WT:  236 pounds. HEENT:  No abnormality detected. NECK:  Supple, no JVD, no carotid bruits, no thyromegaly. CHEST:  Chest is clear to auscultation bilaterally. No rales, no rhonchi. CARDIOVASCULAR:  S1, S2 normal.  Regular rate and rhythm. ABDOMEN:  Soft, nontender, nondistended, bowel sounds present. EXTREMITIES:  Bilateral trace edema present. Dorsalis pedis pulse normal.    NEUROLOGICAL:  Alert and oriented x3. Cranial nerves II-XII grossly intact. Motor 4/5 on left side, 5/5 on right side. Sensory within normal limits. Assessment and Plan:  1. Acute encephalopathy, improved, probably due to infection, now doing better. 2. UTI. Patient finishing up Doxycycline PO antibiotic. He is doing well. 3. Interstitial edema on the lung. No sign of congestive heart failure. Patient received IV antibiotic, followed by PO antibiotic. 4. Hypothyroidism. He is on Synthroid. TSH is stable. 5. History of CVA. He is on aspirin and statin, will continue it. 6. Type 2 diabetes mellitus. Patient is on Metformin and Lantus, will monitor blood sugar closely. 7. Depression. Effexor has been resumed again. 8. Agitation and behavioral disturbance. Patient was seen by psychiatrist, Seroquel was started as needed. THIS IS NOT A COMPLETE MEDICAL RECORD ON THIS PATIENT.    THIS IS A PATIENT AT [facility].    PLEASE CONTACT THE FACILITY LISTED BELOW FOR MORE INFORMATION ON THIS PATIENT.    THE COMPLETE RECORD RESIDES WITH THIS LONG TERM CARE [facility]

## 2020-06-04 ENCOUNTER — PATIENT OUTREACH (OUTPATIENT)
Dept: CASE MANAGEMENT | Age: 75
End: 2020-06-04

## 2020-12-30 ENCOUNTER — HOSPITAL ENCOUNTER (EMERGENCY)
Age: 75
Discharge: HOME | End: 2020-12-30
Attending: EMERGENCY MEDICINE
Payer: MEDICARE

## 2020-12-30 ENCOUNTER — APPOINTMENT (OUTPATIENT)
Dept: CT IMAGING | Age: 75
End: 2020-12-30
Attending: EMERGENCY MEDICINE
Payer: MEDICARE

## 2020-12-30 ENCOUNTER — APPOINTMENT (OUTPATIENT)
Dept: GENERAL RADIOLOGY | Age: 75
End: 2020-12-30
Attending: EMERGENCY MEDICINE
Payer: MEDICARE

## 2020-12-30 VITALS
TEMPERATURE: 97.9 F | DIASTOLIC BLOOD PRESSURE: 68 MMHG | RESPIRATION RATE: 22 BRPM | HEIGHT: 66 IN | BODY MASS INDEX: 40.18 KG/M2 | OXYGEN SATURATION: 97 % | SYSTOLIC BLOOD PRESSURE: 132 MMHG | WEIGHT: 250 LBS | HEART RATE: 88 BPM

## 2020-12-30 DIAGNOSIS — R06.02 SOB (SHORTNESS OF BREATH): Primary | ICD-10-CM

## 2020-12-30 DIAGNOSIS — R05.9 COUGH: ICD-10-CM

## 2020-12-30 DIAGNOSIS — R19.7 DIARRHEA, UNSPECIFIED TYPE: ICD-10-CM

## 2020-12-30 DIAGNOSIS — R29.6 FREQUENT FALLS: ICD-10-CM

## 2020-12-30 DIAGNOSIS — R10.84 ABDOMINAL PAIN, GENERALIZED: ICD-10-CM

## 2020-12-30 LAB
ALBUMIN SERPL-MCNC: 2.4 G/DL (ref 3.5–5)
ALBUMIN/GLOB SERPL: 0.6 {RATIO} (ref 1.1–2.2)
ALP SERPL-CCNC: 264 U/L (ref 45–117)
ALT SERPL-CCNC: 66 U/L (ref 12–78)
ANION GAP SERPL CALC-SCNC: 9 MMOL/L (ref 5–15)
APPEARANCE UR: ABNORMAL
AST SERPL-CCNC: 66 U/L (ref 15–37)
ATRIAL RATE: 85 BPM
BACTERIA URNS QL MICRO: NEGATIVE /HPF
BASOPHILS # BLD: 0 K/UL (ref 0–0.1)
BASOPHILS NFR BLD: 0 % (ref 0–1)
BILIRUB SERPL-MCNC: 0.4 MG/DL (ref 0.2–1)
BILIRUB UR QL: NEGATIVE
BNP SERPL-MCNC: 69 PG/ML (ref 0–450)
BUN SERPL-MCNC: 13 MG/DL (ref 6–20)
BUN/CREAT SERPL: 13 (ref 12–20)
CALCIUM SERPL-MCNC: 8.9 MG/DL (ref 8.5–10.1)
CALCULATED P AXIS, ECG09: 32 DEGREES
CALCULATED R AXIS, ECG10: -62 DEGREES
CALCULATED T AXIS, ECG11: 51 DEGREES
CHLORIDE SERPL-SCNC: 105 MMOL/L (ref 97–108)
CO2 SERPL-SCNC: 23 MMOL/L (ref 21–32)
COLOR UR: ABNORMAL
CREAT SERPL-MCNC: 0.99 MG/DL (ref 0.7–1.3)
DIAGNOSIS, 93000: NORMAL
DIFFERENTIAL METHOD BLD: ABNORMAL
EOSINOPHIL # BLD: 0.2 K/UL (ref 0–0.4)
EOSINOPHIL NFR BLD: 4 % (ref 0–7)
EPITH CASTS URNS QL MICRO: ABNORMAL /LPF
ERYTHROCYTE [DISTWIDTH] IN BLOOD BY AUTOMATED COUNT: 15.1 % (ref 11.5–14.5)
GLOBULIN SER CALC-MCNC: 4.3 G/DL (ref 2–4)
GLUCOSE SERPL-MCNC: 266 MG/DL (ref 65–100)
GLUCOSE UR STRIP.AUTO-MCNC: >1000 MG/DL
HCT VFR BLD AUTO: 39.5 % (ref 36.6–50.3)
HGB BLD-MCNC: 13.1 G/DL (ref 12.1–17)
HGB UR QL STRIP: NEGATIVE
IMM GRANULOCYTES # BLD AUTO: 0 K/UL (ref 0–0.04)
IMM GRANULOCYTES NFR BLD AUTO: 0 % (ref 0–0.5)
KETONES UR QL STRIP.AUTO: NEGATIVE MG/DL
LEUKOCYTE ESTERASE UR QL STRIP.AUTO: NEGATIVE
LIPASE SERPL-CCNC: 114 U/L (ref 73–393)
LYMPHOCYTES # BLD: 1.7 K/UL (ref 0.8–3.5)
LYMPHOCYTES NFR BLD: 26 % (ref 12–49)
MCH RBC QN AUTO: 30.5 PG (ref 26–34)
MCHC RBC AUTO-ENTMCNC: 33.2 G/DL (ref 30–36.5)
MCV RBC AUTO: 91.9 FL (ref 80–99)
MONOCYTES # BLD: 1.1 K/UL (ref 0–1)
MONOCYTES NFR BLD: 17 % (ref 5–13)
NEUTS SEG # BLD: 3.4 K/UL (ref 1.8–8)
NEUTS SEG NFR BLD: 53 % (ref 32–75)
NITRITE UR QL STRIP.AUTO: NEGATIVE
NRBC # BLD: 0 K/UL (ref 0–0.01)
NRBC BLD-RTO: 0 PER 100 WBC
P-R INTERVAL, ECG05: 150 MS
PH UR STRIP: 7 [PH] (ref 5–8)
PLATELET # BLD AUTO: 110 K/UL (ref 150–400)
PMV BLD AUTO: 12.4 FL (ref 8.9–12.9)
POTASSIUM SERPL-SCNC: 3.8 MMOL/L (ref 3.5–5.1)
PROT SERPL-MCNC: 6.7 G/DL (ref 6.4–8.2)
PROT UR STRIP-MCNC: NEGATIVE MG/DL
Q-T INTERVAL, ECG07: 382 MS
QRS DURATION, ECG06: 94 MS
QTC CALCULATION (BEZET), ECG08: 454 MS
RBC # BLD AUTO: 4.3 M/UL (ref 4.1–5.7)
RBC #/AREA URNS HPF: ABNORMAL /HPF (ref 0–5)
SODIUM SERPL-SCNC: 137 MMOL/L (ref 136–145)
SP GR UR REFRACTOMETRY: 1.02 (ref 1–1.03)
TROPONIN I SERPL-MCNC: <0.05 NG/ML
UA: UC IF INDICATED,UAUC: ABNORMAL
UROBILINOGEN UR QL STRIP.AUTO: 2 EU/DL (ref 0.2–1)
VENTRICULAR RATE, ECG03: 85 BPM
WBC # BLD AUTO: 6.4 K/UL (ref 4.1–11.1)
WBC URNS QL MICRO: ABNORMAL /HPF (ref 0–4)

## 2020-12-30 PROCEDURE — 83880 ASSAY OF NATRIURETIC PEPTIDE: CPT

## 2020-12-30 PROCEDURE — 99285 EMERGENCY DEPT VISIT HI MDM: CPT

## 2020-12-30 PROCEDURE — 74176 CT ABD & PELVIS W/O CONTRAST: CPT

## 2020-12-30 PROCEDURE — 87635 SARS-COV-2 COVID-19 AMP PRB: CPT

## 2020-12-30 PROCEDURE — 74011250637 HC RX REV CODE- 250/637: Performed by: EMERGENCY MEDICINE

## 2020-12-30 PROCEDURE — 81001 URINALYSIS AUTO W/SCOPE: CPT

## 2020-12-30 PROCEDURE — 85025 COMPLETE CBC W/AUTO DIFF WBC: CPT

## 2020-12-30 PROCEDURE — 36415 COLL VENOUS BLD VENIPUNCTURE: CPT

## 2020-12-30 PROCEDURE — 71045 X-RAY EXAM CHEST 1 VIEW: CPT

## 2020-12-30 PROCEDURE — 93005 ELECTROCARDIOGRAM TRACING: CPT

## 2020-12-30 PROCEDURE — 84484 ASSAY OF TROPONIN QUANT: CPT

## 2020-12-30 PROCEDURE — 83690 ASSAY OF LIPASE: CPT

## 2020-12-30 PROCEDURE — 80053 COMPREHEN METABOLIC PANEL: CPT

## 2020-12-30 PROCEDURE — 36600 WITHDRAWAL OF ARTERIAL BLOOD: CPT

## 2020-12-30 RX ORDER — SUMATRIPTAN 25 MG/1
50 TABLET, FILM COATED ORAL
Status: COMPLETED | OUTPATIENT
Start: 2020-12-30 | End: 2020-12-30

## 2020-12-30 RX ADMIN — SUMATRIPTAN SUCCINATE 50 MG: 25 TABLET ORAL at 06:42

## 2020-12-30 NOTE — DISCHARGE INSTRUCTIONS
You were evaluated in the emergency department for shortness of breath, cough, abdominal pain, and diarrhea. Your examination was reassuring as was your work-up including blood work, EKG, chest xray, urinalysis, and CT scan of abdomen. It will be important for you to follow-up with your primary care physician in 3-5 days. If you develop worsening symptoms such as chest pain, worsening shortness of breath, or fevers, please return to the emergency department immediately.

## 2020-12-30 NOTE — ED PROVIDER NOTES
EMERGENCY DEPARTMENT HISTORY AND PHYSICAL EXAM      Date: 12/30/2020  Patient Name: Elizabeth Vazquez    History of Presenting Illness     Chief Complaint   Patient presents with    Shortness of Breath       History Provided By: Patient and EMS    HPI: Elizabeth Vazquez, 67 y.o. male with h/o HTN, DM, and CVA with left sided deficits presents to the ED with cc of shortness of breath, chills, cough. Symptoms have been present for the past 1 week. He denies exposure to sick contacts or known COVID-19 exposure. Shortness of breath is present when he is resting and lying flat, not significantly exacerbated with exertion. He is wheelchair-bound at baseline. He has had a constant nonproductive cough over the past week as well. Denies any nausea, vomiting, chest pain. He does endorse some diarrhea over the past week as well as generalized abdominal pain. He has a difficult time qualifying what his abdominal pain feels like but states that it is different than his typical IBS pain. He does endorse some subjective chills but no documented fever at home. He is cared for with a caregiver at home and also has family nearby to help him. He does not have any concern by the safety at home. .    There are no other complaints, changes, or physical findings at this time. PCP: None    No current facility-administered medications on file prior to encounter. Current Outpatient Medications on File Prior to Encounter   Medication Sig Dispense Refill    aspirin 81 mg chewable tablet Take 1 Tab by mouth daily. 30 Tab 1    atorvastatin (LIPITOR) 10 mg tablet Take 1 Tab by mouth nightly. 30 Tab 1    famotidine (PEPCID) 20 mg tablet Take 1 Tab by mouth daily. 30 Tab 1    ferrous sulfate 325 mg (65 mg iron) tablet Take 1 Tab by mouth daily (with breakfast). 30 Tab 1    finasteride (PROSCAR) 5 mg tablet Take 1 Tab by mouth daily.  30 Tab 1    insulin glargine (LANTUS) 100 unit/mL injection 10 units subcut inj qhs 1 Vial 1    metFORMIN (GLUCOPHAGE) 500 mg tablet Take 1 Tab by mouth two (2) times daily (with meals). 60 Tab 1    metoprolol tartrate (LOPRESSOR) 25 mg tablet Take 0.5 Tabs by mouth two (2) times a day. 30 Tab 1    oxybutynin chloride XL (DITROPAN XL) 5 mg CR tablet Take 1 Tab by mouth daily. 30 Tab 1    tamsulosin (FLOMAX) 0.4 mg capsule Take 1 Cap by mouth daily. 30 Cap 1    ondansetron HCl (ZOFRAN PO) Take  by mouth.  venlafaxine-SR (Effexor XR) 150 mg capsule Take  by mouth daily.  levothyroxine sodium (SYNTHROID PO) Take  by mouth. Past History     Past Medical History:  Past Medical History:   Diagnosis Date    Diabetes ([facility] [address]     Gastrointestinal disorder     IBS    Hypertension     Psychiatric disorder     depression, anxiety    Sleep disorder     DENG, CPAP @ night    Stroke ([facility] 1993    L sided deficit       Past Surgical History:  Past Surgical History:   Procedure Laterality Date    HX CHOLECYSTECTOMY      HX KNEE REPLACEMENT      Pt stated    NEUROLOGICAL PROCEDURE UNLISTED         Family History:  History reviewed. No pertinent family history. Social History:  Social History     Tobacco Use    Smoking status: Never Smoker    Smokeless tobacco: Never Used   Substance Use Topics    Alcohol use: No     Frequency: Never    Drug use: No       Allergies: Allergies   Allergen Reactions    Iodine Other (comments)     Pt states his creatinine level increases when he gets IV contrast         Review of Systems   Review of Systems   Constitutional: Positive for chills. Negative for appetite change, fatigue and fever. HENT: Negative. Eyes: Negative for visual disturbance. Respiratory: Positive for cough and shortness of breath. Cardiovascular: Negative for chest pain and leg swelling. Gastrointestinal: Positive for abdominal pain and diarrhea. Negative for nausea and vomiting. Genitourinary: Negative. Musculoskeletal: Negative for back pain.    Skin: Negative for color change and rash. Neurological: Negative for weakness, light-headedness and headaches. All other systems reviewed and are negative. Physical Exam   Physical Exam  Vitals signs and nursing note reviewed. Constitutional:       General: He is not in acute distress. Appearance: Normal appearance. He is obese. He is not ill-appearing or toxic-appearing. Comments: Appears disheveled, chronically debilitated. Lying in bed in no acute distress. HENT:      Head: Normocephalic and atraumatic. Nose: Nose normal.      Mouth/Throat:      Mouth: Mucous membranes are moist.   Eyes:      Extraocular Movements: Extraocular movements intact. Pupils: Pupils are equal, round, and reactive to light. Neck:      Musculoskeletal: Normal range of motion and neck supple. Cardiovascular:      Rate and Rhythm: Normal rate and regular rhythm. Heart sounds: No murmur. Pulmonary:      Effort: Pulmonary effort is normal. No respiratory distress. Breath sounds: Normal breath sounds. No wheezing. Abdominal:      General: There is no distension. Palpations: Abdomen is soft. Tenderness: There is abdominal tenderness ( Mild TTP diffusely without guarding or rebound). There is no guarding or rebound. Musculoskeletal: Normal range of motion. General: No swelling or tenderness. Right lower leg: No edema. Left lower leg: No edema. Skin:     General: Skin is warm and dry. Coloration: Skin is not pale. Findings: No erythema. Neurological:      General: No focal deficit present. Mental Status: He is alert and oriented to person, place, and time.          Diagnostic Study Results     Labs -     Recent Results (from the past 12 hour(s))   EKG, 12 LEAD, INITIAL    Collection Time: 12/30/20 12:36 AM   Result Value Ref Range    Ventricular Rate 85 BPM    Atrial Rate 85 BPM    P-R Interval 150 ms    QRS Duration 94 ms    Q-T Interval 382 ms    QTC Calculation (Bezet) 454 ms    Calculated P Axis 32 degrees    Calculated R Axis -62 degrees    Calculated T Axis 51 degrees    Diagnosis       Normal sinus rhythm  Left anterior fascicular block  Possible Lateral infarct (cited on or before 28-APR-2020)  Inferior infarct (cited on or before 05-MAR-2019)  Abnormal ECG  When compared with ECG of 24-MAY-2020 12:58,  No significant change was found     CBC WITH AUTOMATED DIFF    Collection Time: 12/30/20  1:58 AM   Result Value Ref Range    WBC 6.4 4.1 - 11.1 K/uL    RBC 4.30 4. 10 - 5.70 M/uL    HGB 13.1 12.1 - 17.0 g/dL    HCT 39.5 36.6 - 50.3 %    MCV 91.9 80.0 - 99.0 FL    MCH 30.5 26.0 - 34.0 PG    MCHC 33.2 30.0 - 36.5 g/dL    RDW 15.1 (H) 11.5 - 14.5 %    PLATELET 674 (L) 412 - 400 K/uL    MPV 12.4 8.9 - 12.9 FL    NRBC 0.0 0  WBC    ABSOLUTE NRBC 0.00 0.00 - 0.01 K/uL    NEUTROPHILS 53 32 - 75 %    LYMPHOCYTES 26 12 - 49 %    MONOCYTES 17 (H) 5 - 13 %    EOSINOPHILS 4 0 - 7 %    BASOPHILS 0 0 - 1 %    IMMATURE GRANULOCYTES 0 0.0 - 0.5 %    ABS. NEUTROPHILS 3.4 1.8 - 8.0 K/UL    ABS. LYMPHOCYTES 1.7 0.8 - 3.5 K/UL    ABS. MONOCYTES 1.1 (H) 0.0 - 1.0 K/UL    ABS. EOSINOPHILS 0.2 0.0 - 0.4 K/UL    ABS. BASOPHILS 0.0 0.0 - 0.1 K/UL    ABS. IMM. GRANS. 0.0 0.00 - 0.04 K/UL    DF AUTOMATED     METABOLIC PANEL, COMPREHENSIVE    Collection Time: 12/30/20  1:58 AM   Result Value Ref Range    Sodium 137 136 - 145 mmol/L    Potassium 3.8 3.5 - 5.1 mmol/L    Chloride 105 97 - 108 mmol/L    CO2 23 21 - 32 mmol/L    Anion gap 9 5 - 15 mmol/L    Glucose 266 (H) 65 - 100 mg/dL    BUN 13 6 - 20 MG/DL    Creatinine 0.99 0.70 - 1.30 MG/DL    BUN/Creatinine ratio 13 12 - 20      GFR est AA >60 >60 ml/min/1.73m2    GFR est non-AA >60 >60 ml/min/1.73m2    Calcium 8.9 8.5 - 10.1 MG/DL    Bilirubin, total 0.4 0.2 - 1.0 MG/DL    ALT (SGPT) 66 12 - 78 U/L    AST (SGOT) 66 (H) 15 - 37 U/L    Alk.  phosphatase 264 (H) 45 - 117 U/L    Protein, total 6.7 6.4 - 8.2 g/dL    Albumin 2.4 (L) 3.5 - 5.0 g/dL Globulin 4.3 (H) 2.0 - 4.0 g/dL    A-G Ratio 0.6 (L) 1.1 - 2.2     NT-PRO BNP    Collection Time: 12/30/20  1:58 AM   Result Value Ref Range    NT pro-BNP 69 0 - 450 PG/ML   TROPONIN I    Collection Time: 12/30/20  1:58 AM   Result Value Ref Range    Troponin-I, Qt. <0.05 <0.05 ng/mL   LIPASE    Collection Time: 12/30/20  1:58 AM   Result Value Ref Range    Lipase 114 73 - 393 U/L   URINALYSIS W/ REFLEX CULTURE    Collection Time: 12/30/20  3:08 AM    Specimen: Urine   Result Value Ref Range    Color YELLOW/STRAW      Appearance CLOUDY (A) CLEAR      Specific gravity 1.020 1.003 - 1.030      pH (UA) 7.0 5.0 - 8.0      Protein Negative NEG mg/dL    Glucose >1,000 (A) NEG mg/dL    Ketone Negative NEG mg/dL    Bilirubin Negative NEG      Blood Negative NEG      Urobilinogen 2.0 (H) 0.2 - 1.0 EU/dL    Nitrites Negative NEG      Leukocyte Esterase Negative NEG      WBC 0-4 0 - 4 /hpf    RBC 0-5 0 - 5 /hpf    Epithelial cells FEW FEW /lpf    Bacteria Negative NEG /hpf    UA:UC IF INDICATED CULTURE NOT INDICATED BY UA RESULT CNI         Radiologic Studies -   CT ABD PELV WO CONT   Final Result   IMPRESSION:      1. No evidence of acute process in the abdomen or pelvis. 2. Morphologic changes of cirrhosis. XR CHEST PORT   Final Result   IMPRESSION: No acute cardiopulmonary process. CT Results  (Last 48 hours)               12/30/20 0123  CT ABD PELV WO CONT Final result    Impression:  IMPRESSION:       1. No evidence of acute process in the abdomen or pelvis. 2. Morphologic changes of cirrhosis. Narrative:  EXAM:  CT ABD PELV WO CONT       INDICATION: generalized abd pain, diarrhea       COMPARISON: CT abdomen pelvis 4/28/2020. CONTRAST:  None. TECHNIQUE:    Thin axial images were obtained through the abdomen and pelvis. Coronal and   sagittal reconstructions were generated. Oral contrast was not administered.  CT   dose reduction was achieved through use of a standardized protocol tailored for   this examination and automatic exposure control for dose modulation. FINDINGS:    Lower Thorax:   Lung Bases: Unchanged bilateral subpleural reticulation, consistent with   nonspecific fibrosis. No pleural effusion. Heart: The heart is normal in size. No pericardial effusion. Coronary artery   calcifications/stents noted. Abdomen/Pelvis:   Evaluation of the solid organs is markedly limited without the use of IV   contrast.       Liver:  Nodular liver contour is again noted. Biliary system: Gallbladder is surgically absent. Spleen: Normal.       Pancreas: Normal.       Kidneys/Ureters/Bladder: Stable indeterminate small hypodensities in the lower   pole the right kidney and interpolar region left kidney, statistically benign   cysts. No renal or ureteral calculi. No hydronephrosis or hydroureter. The   bladder is normal.       Adrenals: Normal.       Stomach/bowel: No dilation or abnormal wall thickening is present. No free   intraperitoneal air noted. Reproductive Organs: Prostate is normal in size. Vasculature: Normal caliber arteries. Mild calcific atherosclerosis of the   abdominal aorta. Nodes: No pathologically enlarged lymph nodes. Fluid: No free fluid. Bones/Soft Tissue: No acute fractures or aggressive osseous lesions are seen. Stable multilevel degenerative disc disease and facet arthropathy in the lumbar   spine. CXR Results  (Last 48 hours)               12/30/20 0123  XR CHEST PORT Final result    Impression:  IMPRESSION: No acute cardiopulmonary process. Narrative:  EXAM:  XR CHEST PORT       INDICATION:   SOB       COMPARISON: Chest radiograph 5/24/2020. FINDINGS: AP radiograph of the chest was obtained. No evidence of focal consolidation. No pleural effusion or pneumothorax. Heart,   chrissy, mediastinum are within normal limits. No acute osseous abnormalities. Medical Decision Making   I am the first provider for this patient. I reviewed the vital signs, available nursing notes, past medical history, past surgical history, family history and social history. Vital Signs-Reviewed the patient's vital signs. Patient Vitals for the past 12 hrs:   Temp Pulse Resp BP SpO2   12/30/20 0255  90 20 (!) 125/94 95 %   12/30/20 0059     99 %   12/30/20 0014 97.9 °F (36.6 °C) 86 20 123/65 97 %       EKG interpretation:   EKG per my interpretation normal sinus rhythm, rate 85 bpm, normal axis, no acute ischemic changes, no interval changes. Records Reviewed: Nursing Notes, Old Medical Records, Previous Radiology Studies and Previous Laboratory Studies  I personally reviewed Discharge summary records from 5/30/20 when he was admitted for chest pain. Provider Notes (Medical Decision Making):   54-year-old male here with shortness of breath, cough, abdominal pain, diarrhea, chills. Exam as above. He is in no acute respiratory distress. He is afebrile and vital signs stable. O2 sat greater than 97% on room air. Differential diagnosis includes bacterial pneumonia, viral syndrome, COVID-19 infection, pulmonary edema, PE, ACS, pneumothorax, IBS exacerbation, AAA, appendicitis, cholecystitis, colitis. Will evaluate with blood work, chest x-ray, EKG, urinalysis, and CT abdomen pelvis and reassess. On reexamination still in no acute respiratory distress. No hypoxia or increased respiratory effort. Work-up largely unremarkable including chest x-ray, CT scan. Blood work largely unremarkable without leukocytosis and hemoglobin is stable. No significant electrolyte abnormality. Troponin negative and proBNP within normal limits. Will send off SARS-CoV-2 swab. Patient given strict return ED precautions. All questions answered and he agrees with plan as above. Encouraged to follow-up with PCP and he is comfortable with this plan.       ED Course:   Initial assessment performed. The patients presenting problems have been discussed, and they are in agreement with the care plan formulated and outlined with them. I have encouraged them to ask questions as they arise throughout their visit. Discharge Note:  The patient has been re-evaluated and is ready for discharge. Reviewed available results with patient. Counseled patient on diagnosis and care plan. Patient has expressed understanding, and all questions have been answered. Patient agrees with plan and agrees to follow up as recommended, or to return to the ED if their symptoms worsen. Discharge instructions have been provided and explained to the patient, along with reasons to return to the ED. Disposition:  Discharge      DISCHARGE PLAN:  1. Current Discharge Medication List        2. Follow-up Information     Follow up With Specialties Details Why [address] [facility] EMERGENCY DEPT Emergency Medicine Go to  As needed, If symptoms worsen [address] MD Family Medicine Schedule an appointment as soon as possible for a visit   Larissa [address].  [phone]          3. Return to ED if worse     Diagnosis     Clinical Impression:   1. SOB (shortness of breath)    2. Cough    3. Abdominal pain, generalized    4. Diarrhea, unspecified type    5. Frequent falls        Attestations:    Hope Santos MD    Please note that this dictation was completed with [employer], the computer voice recognition software. Quite often unanticipated grammatical, syntax, homophones, and other interpretive errors are inadvertently transcribed by the computer software. Please disregard these errors. Please excuse any errors that have escaped final proofreading. Thank you.

## 2020-12-30 NOTE — ED TRIAGE NOTES
Patient to ED via EMS c/o SOB, cough and x1 week. Also c/o pain all over after frequent falls at home. Also c/o diarrhea and abdominal pain x5 days. Hx of IBS. Triage delayed due to provider at bedside evaluating patient during triage.

## 2020-12-31 LAB
COVID-19, XGCOVT: NOT DETECTED
HEALTH STATUS, XMCV2T: NORMAL
SOURCE, COVRS: NORMAL
SPECIMEN SOURCE, FCOV2M: NORMAL
SPECIMEN TYPE, XMCV1T: NORMAL

## 2021-02-03 ENCOUNTER — HOSPITAL ENCOUNTER (EMERGENCY)
Age: 76
Discharge: HOME | End: 2021-02-03
Attending: EMERGENCY MEDICINE
Payer: MEDICARE

## 2021-02-03 ENCOUNTER — APPOINTMENT (OUTPATIENT)
Dept: CT IMAGING | Age: 76
End: 2021-02-03
Attending: EMERGENCY MEDICINE
Payer: MEDICARE

## 2021-02-03 VITALS
DIASTOLIC BLOOD PRESSURE: 83 MMHG | HEART RATE: 83 BPM | OXYGEN SATURATION: 94 % | BODY MASS INDEX: 40.81 KG/M2 | SYSTOLIC BLOOD PRESSURE: 149 MMHG | TEMPERATURE: 98.7 F | RESPIRATION RATE: 24 BRPM | HEIGHT: 67 IN | WEIGHT: 260 LBS

## 2021-02-03 DIAGNOSIS — N30.00 ACUTE CYSTITIS WITHOUT HEMATURIA: Primary | ICD-10-CM

## 2021-02-03 LAB
APPEARANCE UR: ABNORMAL
BACTERIA URNS QL MICRO: ABNORMAL /HPF
BILIRUB UR QL: NEGATIVE
COLOR UR: ABNORMAL
EPITH CASTS URNS QL MICRO: ABNORMAL /LPF
GLUCOSE UR STRIP.AUTO-MCNC: NEGATIVE MG/DL
HGB UR QL STRIP: NEGATIVE
KETONES UR QL STRIP.AUTO: NEGATIVE MG/DL
LEUKOCYTE ESTERASE UR QL STRIP.AUTO: ABNORMAL
NITRITE UR QL STRIP.AUTO: NEGATIVE
PH UR STRIP: 6 [PH] (ref 5–8)
PROT UR STRIP-MCNC: NEGATIVE MG/DL
RBC #/AREA URNS HPF: ABNORMAL /HPF (ref 0–5)
SP GR UR REFRACTOMETRY: 1.02 (ref 1–1.03)
UA: UC IF INDICATED,UAUC: ABNORMAL
UROBILINOGEN UR QL STRIP.AUTO: 4 EU/DL (ref 0.2–1)
WBC URNS QL MICRO: ABNORMAL /HPF (ref 0–4)

## 2021-02-03 PROCEDURE — 74176 CT ABD & PELVIS W/O CONTRAST: CPT

## 2021-02-03 PROCEDURE — 81001 URINALYSIS AUTO W/SCOPE: CPT

## 2021-02-03 PROCEDURE — 87077 CULTURE AEROBIC IDENTIFY: CPT

## 2021-02-03 PROCEDURE — 87086 URINE CULTURE/COLONY COUNT: CPT

## 2021-02-03 PROCEDURE — 99284 EMERGENCY DEPT VISIT MOD MDM: CPT

## 2021-02-03 PROCEDURE — 74011250637 HC RX REV CODE- 250/637: Performed by: EMERGENCY MEDICINE

## 2021-02-03 PROCEDURE — 87186 SC STD MICRODIL/AGAR DIL: CPT

## 2021-02-03 RX ORDER — SIMVASTATIN 10 MG/1
10 TABLET, FILM COATED ORAL
COMMUNITY

## 2021-02-03 RX ORDER — MELATONIN
DAILY
COMMUNITY

## 2021-02-03 RX ORDER — CEPHALEXIN 500 MG/1
500 CAPSULE ORAL
Status: COMPLETED | OUTPATIENT
Start: 2021-02-03 | End: 2021-02-03

## 2021-02-03 RX ORDER — ESOMEPRAZOLE MAGNESIUM 40 MG/1
CAPSULE, DELAYED RELEASE ORAL DAILY
COMMUNITY

## 2021-02-03 RX ORDER — LISINOPRIL 20 MG/1
20 TABLET ORAL DAILY
COMMUNITY

## 2021-02-03 RX ORDER — CEFDINIR 300 MG/1
300 CAPSULE ORAL 2 TIMES DAILY
Qty: 14 CAP | Refills: 0 | Status: SHIPPED | OUTPATIENT
Start: 2021-02-03

## 2021-02-03 RX ADMIN — CEPHALEXIN 500 MG: 500 CAPSULE ORAL at 04:13

## 2021-02-03 NOTE — ED PROVIDER NOTES
EMERGENCY DEPARTMENT HISTORY AND PHYSICAL EXAM      Date: 2/3/2021  Patient Name: Maria R Pedro    Please note that this dictation was completed with [employer], the computer voice recognition software. Quite often unanticipated grammatical, syntax, homophones, and other interpretive errors are inadvertently transcribed by the computer software. Please disregard these errors. Please excuse any errors that have escaped final proofreading. History of Presenting Illness     Chief Complaint   Patient presents with    Urinary Pain       History Provided By: Patient     HPI: Maria R Pedro, 67 y.o. male, h/o HTN, DM, and CVA with left sided deficits  presenting the emergency department complaining of lower abdominal pain, urinary discomfort. Symptoms been going on for a few days. Reports increasing lower abdominal pain, nonradiating, nothing makes it better, nothing makes it worse. Is associated with pain with urination. Denies any testicular pain, denies any sores or redness in the groin. No fevers or chills. No nausea vomiting or diarrhea. Patient was recently seen at [facility] at the end of January and a few weeks before that. He was diagnosed with UTI, then on repeat visit was felt to likely be colonization. Nothing makes the symptoms better today. Nothing makes them worse. No other exacerbating relieving factors or associated symptoms    PCP: None    No current facility-administered medications on file prior to encounter. Current Outpatient Medications on File Prior to Encounter   Medication Sig Dispense Refill    simvastatin (ZOCOR) 10 mg tablet Take 10 mg by mouth nightly.  cholecalciferol (Vitamin D3) (1000 Units /25 mcg) tablet Take  by mouth daily.  lisinopriL (PRINIVIL, ZESTRIL) 20 mg tablet Take 20 mg by mouth daily.  esomeprazole (NEXIUM) 40 mg capsule Take  by mouth daily.  aspirin 81 mg chewable tablet Take 1 Tab by mouth daily.  30 Tab 1    ferrous sulfate 325 mg (65 mg iron) tablet Take 1 Tab by mouth daily (with breakfast). 30 Tab 1    finasteride (PROSCAR) 5 mg tablet Take 1 Tab by mouth daily. 30 Tab 1    metFORMIN (GLUCOPHAGE) 500 mg tablet Take 1 Tab by mouth two (2) times daily (with meals). 60 Tab 1    tamsulosin (FLOMAX) 0.4 mg capsule Take 1 Cap by mouth daily. 30 Cap 1    venlafaxine-SR (Effexor XR) 150 mg capsule Take  by mouth daily.  levothyroxine sodium (SYNTHROID PO) Take  by mouth.  atorvastatin (LIPITOR) 10 mg tablet Take 1 Tab by mouth nightly. 30 Tab 1    famotidine (PEPCID) 20 mg tablet Take 1 Tab by mouth daily. 30 Tab 1    insulin glargine (LANTUS) 100 unit/mL injection 10 units subcut inj qhs 1 Vial 1    metoprolol tartrate (LOPRESSOR) 25 mg tablet Take 0.5 Tabs by mouth two (2) times a day. 30 Tab 1    oxybutynin chloride XL (DITROPAN XL) 5 mg CR tablet Take 1 Tab by mouth daily. 30 Tab 1    ondansetron HCl (ZOFRAN PO) Take  by mouth. Past History     Past Medical History:  Past Medical History:   Diagnosis Date    Diabetes ([facility]     Gastrointestinal disorder     IBS    Hypertension     Psychiatric disorder     depression, anxiety    Sleep disorder     DENG, CPAP @ night    [facility]    L sided deficit       Past Surgical History:  Past Surgical History:   Procedure Laterality Date    HX CHOLECYSTECTOMY      HX KNEE REPLACEMENT      Pt stated    NEUROLOGICAL PROCEDURE UNLISTED         Family History:  History reviewed. No pertinent family history. Social History:  Social History     Tobacco Use    Smoking status: Never Smoker    Smokeless tobacco: Never Used   Substance Use Topics    Alcohol use: No     Frequency: Never    Drug use: No       Allergies: Allergies   Allergen Reactions    Iodine Other (comments)     Pt states his creatinine level increases when he gets IV contrast         Review of Systems   Review of Systems   Constitutional: Negative for chills and fever.    HENT: Negative for congestion and sore throat. Eyes: Negative for visual disturbance. Respiratory: Negative for cough and shortness of breath. Cardiovascular: Negative for chest pain and leg swelling. Gastrointestinal: Positive for abdominal pain. Negative for blood in stool, diarrhea and vomiting. Endocrine: Negative for polyuria. Genitourinary: Positive for dysuria. Negative for discharge, penile pain, penile swelling, scrotal swelling and testicular pain. Musculoskeletal: Negative for arthralgias, joint swelling and myalgias. Skin: Negative for rash. Allergic/Immunologic: Negative for immunocompromised state. Neurological: Negative for weakness and headaches. Hematological: Does not bruise/bleed easily. Psychiatric/Behavioral: Negative for confusion. Physical Exam   Physical Exam  Vitals signs and nursing note reviewed. Constitutional:       Appearance: He is well-developed. Comments: Overweight, disheveled male in no acute distress   HENT:      Head: Normocephalic and atraumatic. Eyes:      General:         Right eye: No discharge. Left eye: No discharge. Conjunctiva/sclera: Conjunctivae normal.      Pupils: Pupils are equal, round, and reactive to light. Neck:      Musculoskeletal: Normal range of motion and neck supple. Trachea: No tracheal deviation. Cardiovascular:      Rate and Rhythm: Normal rate and regular rhythm. Heart sounds: Normal heart sounds. No murmur. Pulmonary:      Effort: Pulmonary effort is normal. No respiratory distress. Breath sounds: Normal breath sounds. No wheezing or rales. Abdominal:      General: Bowel sounds are normal.      Palpations: Abdomen is soft. Tenderness: There is abdominal tenderness. There is no guarding or rebound. Comments: Mild diffuse lower abdominal tenderness, no guarding or rebound   Genitourinary:     Comments: Normal male genitalia  Musculoskeletal: Normal range of motion.          General: No tenderness or deformity. Skin:     General: Skin is warm and dry. Findings: No erythema or rash. Neurological:      Mental Status: He is alert and oriented to person, place, and time. Psychiatric:         Behavior: Behavior normal.         Diagnostic Study Results     Labs -     Recent Results (from the past 12 hour(s))   URINALYSIS W/ REFLEX CULTURE    Collection Time: 02/03/21  3:19 AM    Specimen: Urine   Result Value Ref Range    Color YELLOW/STRAW      Appearance CLOUDY (A) CLEAR      Specific gravity 1.020 1.003 - 1.030      pH (UA) 6.0 5.0 - 8.0      Protein Negative NEG mg/dL    Glucose Negative NEG mg/dL    Ketone Negative NEG mg/dL    Bilirubin Negative NEG      Blood Negative NEG      Urobilinogen 4.0 (H) 0.2 - 1.0 EU/dL    Nitrites Negative NEG      Leukocyte Esterase SMALL (A) NEG      WBC 5-10 0 - 4 /hpf    RBC 0-5 0 - 5 /hpf    Epithelial cells FEW FEW /lpf    Bacteria 1+ (A) NEG /hpf    UA:UC IF INDICATED CULTURE NOT INDICATED BY UA RESULT CNI         Radiologic Studies -   CT ABD PELV WO CONT   Final Result   Lateral wall thickening and minimal inflammatory stranding adjacent to the   bladder suggestive of cystitis. Otherwise no interval change. CT Results  (Last 48 hours)               02/03/21 0235  CT ABD PELV WO CONT Final result    Impression:  Lateral wall thickening and minimal inflammatory stranding adjacent to the   bladder suggestive of cystitis. Otherwise no interval change. Narrative:  CLINICAL HISTORY: abdominal pain    INDICATION: abdominal pain   COMPARISON: 12/30/2020   CONTRAST:  None. TECHNIQUE:    Thin axial images were obtained through the abdomen and pelvis. Coronal and   sagittal reformats were generated. Oral contrast was not administered. CT dose   reduction was achieved through use of a standardized protocol tailored for this   examination and automatic exposure control for dose modulation.         The absence of intravenous contrast material reduces the sensitivity for   evaluation of visceral organs and vasculature including presence of small mass   lesions, hemodynamically significant stenoses, dissections, mucosal   abnormalities etc.       FINDINGS:    LOWER THORAX: Coronary vascular calcifications. LIVER/GALLBLADDER: Nodular contour cholecystectomy CBD is not dilated. SPLEEN/PANCREAS:  within normal limits. ADRENALS/KIDNEYS: Left renal hypodensity is most likely a cyst. Right renal   hypodensity is not characterized No calculus or hydronephrosis. STOMACH: Unremarkable. SMALL BOWEL/COLON: Fecal stasis. No dilatation or wall thickening. APPENDIX: Unremarkable. PERITONEUM: No ascites or pneumoperitoneum. RETROPERITONEUM: No lymphadenopathy or aortic aneurysm. REPRODUCTIVE ORGANS:   URINARY BLADDER: Minimal bladder wall thickening. Minimal inflammatory stranding   adjacent to the bladder   BONES: Canal stenosis at L3-4 and L5-S1. Canal stenosis at L2-3 as well. ADDITIONAL COMMENTS: N/A               CXR Results  (Last 48 hours)    None            Medical Decision Making   I am the first provider for this patient. I reviewed the vital signs, available nursing notes, past medical history, past surgical history, family history and social history. Vital Signs-Reviewed the patient's vital signs. Patient Vitals for the past 12 hrs:   Temp Pulse Resp BP SpO2   02/03/21 0107 98.7 °F (37.1 °C) 83 24 (!) 114/53 94 %         Records Reviewed:   Nursing notes, Prior visits     ED visit notes from [facility] reviewed, prior ED visit from 12/30/2020    Provider Notes (Medical Decision Making):   Patient looks nontoxic, but chronically ill. He is refusing any labs at this time. Will obtain a CT without contrast though to assess for other acute intra-abdominal process. Will check a urine. Disposition pending imaging and urinalysis. ED Course:   Initial assessment performed.  The patients presenting problems have been discussed, and they are in agreement with the care plan formulated and outlined with them. I have encouraged them to ask questions as they arise throughout their visit. Critical Care Time:   none    Disposition:    DISCHARGE NOTE  Patients results have been reviewed with them. Patient and/or family have verbally conveyed their understanding and agreement of the patient's signs, symptoms, diagnosis, treatment and prognosis and additionally agree to follow up as recommended or return to the Emergency Room should their condition change or have any new concerns prior to their follow-up appointment. Patient verbally agrees with the care-plan and verbally conveys that all of their questions have been answered. Discharge instructions have also been provided to the patient with some educational information regarding their diagnosis as well a list of reasons why they would want to return to the ER prior to their follow-up appointment should their condition change. PLAN:  1. Current Discharge Medication List      START taking these medications    Details   cefdinir (OMNICEF) 300 mg capsule Take 1 Cap by mouth two (2) times a day. Qty: 14 Cap, Refills: 0           2. Follow-up Information    None         Return to ED if worse     Diagnosis     Clinical Impression:   1. Acute cystitis without hematuria        Attestations:   This note was completed by Rickie Graham DO

## 2021-02-03 NOTE — ED NOTES
Pt unable to urinate at this time, but reports he will keep trying;  Bed rails x 2 up; urinal in hand; call bell at bedside

## 2021-02-03 NOTE — ED NOTES
Pt reports he is difficult IV stick and states, \"I've had 20 needles and I hate them. \" When asked if I can look at his veins pt refused and stated, \"Only if you do US IV cause that's what they have to do every time. \"    Dr Dev Hernandez aware but reports pt can go to CT scan first.    Pt still trying to urinate

## 2021-02-03 NOTE — ED NOTES
Provider aware pt has returned from CT; reports he will wait for CT results before obtaining lab work

## 2021-02-03 NOTE — LETTER
2/6/2021 Radha Alonso [address] [place], [place] [address] [id number] Dear Mr. Sultana Rosario You were seen in the Emergency Department of [address] [place] [address] on 2/3/21 and had lab and/or radiology tests performed. We would like to discuss these results with you . Please call the Emergency Department at your earliest convenience at [phone], to speak with one of our providers. The Urine culture from your Emergency Department visit on 2/3/21 was positive. Your antibiotic may need to be changed. Please contact the Emergency Department at [phone]. Sincerely, Virgin Gilford, PA-C 
 
[facility] [place] EMERGENCY DEPT 
[address] [place] [id number] [phone]

## 2021-02-03 NOTE — ED NOTES
Called [facility] to schedule stretcher transport home as pt is unable to walk. PCS and facesheet faxed to [facility].      Confirmation # [id number]

## 2021-02-06 LAB
BACTERIA SPEC CULT: ABNORMAL
BACTERIA SPEC CULT: ABNORMAL
CC UR VC: ABNORMAL
SERVICE CMNT-IMP: ABNORMAL

## 2021-06-15 ENCOUNTER — EXTERNAL NURSING HOME DOCUMENTATION (OUTPATIENT)
Dept: INTERNAL MEDICINE CLINIC | Age: 76
End: 2021-06-15
Payer: MEDICARE

## 2021-06-15 DIAGNOSIS — I63.9 CEREBROVASCULAR ACCIDENT (CVA), UNSPECIFIED MECHANISM (HCC): ICD-10-CM

## 2021-06-15 DIAGNOSIS — A41.9 SEPSIS, DUE TO UNSPECIFIED ORGANISM, UNSPECIFIED WHETHER ACUTE ORGAN DYSFUNCTION PRESENT (HCC): ICD-10-CM

## 2021-06-15 DIAGNOSIS — F41.8 DEPRESSION WITH ANXIETY: ICD-10-CM

## 2021-06-15 DIAGNOSIS — E11.9 TYPE 2 DIABETES MELLITUS WITHOUT COMPLICATION, WITH LONG-TERM CURRENT USE OF INSULIN (HCC): ICD-10-CM

## 2021-06-15 DIAGNOSIS — K76.82 HEPATIC ENCEPHALOPATHY: Primary | ICD-10-CM

## 2021-06-15 DIAGNOSIS — Z79.4 TYPE 2 DIABETES MELLITUS WITHOUT COMPLICATION, WITH LONG-TERM CURRENT USE OF INSULIN (HCC): ICD-10-CM

## 2021-06-15 DIAGNOSIS — K70.30 ALCOHOLIC CIRRHOSIS OF LIVER WITHOUT ASCITES (HCC): ICD-10-CM

## 2021-06-15 PROCEDURE — 99306 1ST NF CARE HIGH MDM 50: CPT | Performed by: INTERNAL MEDICINE

## 2021-06-15 NOTE — PROGRESS NOTES
History of Present Illness: This is a 69-year-old  male with past medical history significant for alcoholism, substance abuse, depression, hypertension, and TIA, presented to [facility] Emergency Room with a complaint of weakness on the left side of his face and not able to speak. In the Emergency Room, he was found to be hypoxic with saturation in the upper 80s. The patient was not able to provide any history to EMS. So, he was admitted to hospital.  He had CT head done, which showed no active neurological events. He was found to have metabolic encephalopathy [pls verify - dictated in Plan as hepatic encephalopathy]. Ammonia level was very high. He was started on lactulose. He had history of elevated ammonia and cirrhosis in the past, and he was found to be septic. Empiric antibiotics started with cefepime and vancomycin. IV fluid was given. CT chest was done that was negative for any pneumonia. For hypoxia, he was started on NRB for adequate oxygenation. CT chest was negative for pulmonary embolism and pneumonia. He also had left tuberculosis, which came down after treating him with IV fluid and antibiotics. He was stabilized and was transferred to [facility]. He is doing okay in the rehab. Right now he is not in distress. Past Medical History:  Alcohol abuse, arthritis, depression and mood disorder, GERD, gastritis, hypertension, TIA, hyperlipidemia, chronic headache, history of left hemorrhagic CVA, gastroparesis, type 2 diabetes mellitus. Past Surgical History:  Not known. Social History:  The patient had history of alcohol abuse. He used to drink a beer every day. Smoking history was unknown. He is [employer]. He has a son. Family History:  Positive for diabetes, hypertension, and hyperlipidemia. Allergies:  He is not allergic to any medication. Medications:  He is on famotidine 20 mg twice a day, vancomycin IV antibiotic.     Review of Systems: Complete review of systems done. Right now essentially negative. Physical Examination:  GENERAL:  This is a pleasant  male, not in apparent distress. VITALS:  T:  98 degrees Fahrenheit. P:  70 per minute. BP:  130/80 mmHg. SaO2:  95% on room air. HEENT:  No abnormality detected. NECK:  Supple, no JVD, no carotid bruits, no thyromegaly. CHEST:  Chest is clear to auscultation bilaterally. No rales, no rhonchi. CARDIOVASCULAR:  S1, S2 normal.  Regular rate and rhythm. ABDOMEN:  Soft, nontender, nondistended, bowel sounds present. EXTREMITIES:  No edema noticed. Vascular status was normal.  NEUROLOGICAL:  Alert and oriented x2. Cranial nerves II through XII grossly intact. Motor 4/5 bilaterally. Sensory within normal limits. Assessment and Plan:  1. Hepatic encephalopathy [pls verify - dictated in HPI as metabolic encephalopathy with high ammonia. The patient's ammonia level came down. He was started on lactulose. He will continue lactulose for now. 2. Liver cirrhosis. The patient was seen by GI specialist, Dr. Timothy Wisdom. Had history of thrombocytopenia. We will monitor platelet count. No GI bleeding right now. We will continue lactulose. 3. Sepsis. The patient received empiric IV cefepime and vancomycin. lactic acid level came down. No history of pneumonia. 4. Hypoxia. Improved since infection subsided. The patient is able to breathe on his own. 5. History of type 2 diabetes mellitus. We will monitor his blood sugar closely. He is not on any medications. The patient was receiving sliding scale insulin, we will put him back on sliding scale. 6. Gait weakness. The patient will be evaluated by Physical Therapy. THIS IS NOT A COMPLETE MEDICAL RECORD ON THIS PATIENT.    THIS IS A PATIENT AT [facility].    PLEASE CONTACT THE FACILITY LISTED BELOW FOR MORE INFORMATION ON THIS PATIENT.    THE COMPLETE RECORD RESIDES WITH THIS LONG TERM CARE FACILITY

## 2021-06-25 ENCOUNTER — EXTERNAL NURSING HOME DOCUMENTATION (OUTPATIENT)
Dept: INTERNAL MEDICINE CLINIC | Age: 76
End: 2021-06-25
Payer: MEDICARE

## 2021-06-25 DIAGNOSIS — E03.9 ACQUIRED HYPOTHYROIDISM: ICD-10-CM

## 2021-06-25 DIAGNOSIS — K76.82 HEPATIC ENCEPHALOPATHY: ICD-10-CM

## 2021-06-25 DIAGNOSIS — R45.1 AGITATION: ICD-10-CM

## 2021-06-25 DIAGNOSIS — K70.30 ALCOHOLIC CIRRHOSIS OF LIVER WITHOUT ASCITES (HCC): ICD-10-CM

## 2021-06-25 DIAGNOSIS — F41.8 DEPRESSION WITH ANXIETY: Primary | ICD-10-CM

## 2021-06-25 DIAGNOSIS — B37.2 CANDIDAL INTERTRIGO: ICD-10-CM

## 2021-06-25 PROCEDURE — 99309 SBSQ NF CARE MODERATE MDM 30: CPT | Performed by: INTERNAL MEDICINE

## 2021-06-25 NOTE — PROGRESS NOTES
Progress Note     Subjective:  Mr. Devora Diehl is doing well. Sometimes he screams, he has depression and sometimes psychosis. He suffers from liver cirrhosis, ammonia level was okay. He seems lucid and able to communicate with me well. He has some developed some candidal rash in the back for which he is on topical cream.  Rash is getting better. Sepsis has improved. He is afebrile. He is able to tolerate food well. Objective:    VITALS:  T:  97.7 degrees Fahrenheit. P:  68 per minute. BP:  106/69 mmHg. SaO2:  97% on room air. Weight is 224 pounds. HEENT:  No abnormality detected. NECK:  Supple, no JVD, no carotid bruits, no thyromegaly. CHEST:  Chest is clear to auscultation bilaterally. No rales, no rhonchi. CARDIOVASCULAR:  S1, S2 normal.  Regular rate and rhythm. ABDOMEN:  Soft, nontender, nondistended, bowel sounds present. EXTREMITIES:  No edema is noticed. Dorsalis pedis pulse normal.  NEUROLOGICAL:  Alert and oriented x3. No neurological deficits. SKIN:  The patient has some candidal rash on the lower back. Laboratory Data:   Labs were reviewed. CBC shows low platelet count. CMP is stable but hemoglobin A1c is 10.6. Assessment and Plan:   1. Candidal infection. The patient is on nystatin twice a day. Rash is getting better. 2. Type 2 diabetes mellitus, uncontrolled diabetes. Lantus dosage was increased. 3. Liver cirrhosis with history of hepatic encephalopathy. The patient is on lactulose and Levaquin. We will monitor him for now. 4. Anxiety and depression with psychosis. The patient is on BuSpar. We will monitor him. If needed we will increase his Seroquel dosage. We will not intervene anything right now. 5. Gait weakness. The patient to continue physical therapy and occupational therapy. THIS IS NOT A COMPLETE MEDICAL RECORD ON THIS PATIENT.    THIS IS A PATIENT AT [facility].    PLEASE CONTACT THE FACILITY LISTED BELOW FOR MORE INFORMATION ON THIS PATIENT.    THE COMPLETE RECORD RESIDES WITH THIS LONG TERM CARE FACILITY

## 2021-06-28 ENCOUNTER — EXTERNAL NURSING HOME DOCUMENTATION (OUTPATIENT)
Dept: INTERNAL MEDICINE CLINIC | Age: 76
End: 2021-06-28
Payer: MEDICARE

## 2021-06-28 DIAGNOSIS — R41.82 ALTERED MENTAL STATUS, UNSPECIFIED ALTERED MENTAL STATUS TYPE: Primary | ICD-10-CM

## 2021-06-28 DIAGNOSIS — E11.9 TYPE 2 DIABETES MELLITUS WITHOUT COMPLICATION, WITH LONG-TERM CURRENT USE OF INSULIN (HCC): ICD-10-CM

## 2021-06-28 DIAGNOSIS — Z79.4 TYPE 2 DIABETES MELLITUS WITHOUT COMPLICATION, WITH LONG-TERM CURRENT USE OF INSULIN (HCC): ICD-10-CM

## 2021-06-28 DIAGNOSIS — K70.30 ALCOHOLIC CIRRHOSIS OF LIVER WITHOUT ASCITES (HCC): ICD-10-CM

## 2021-06-28 DIAGNOSIS — K76.82 HEPATIC ENCEPHALOPATHY: ICD-10-CM

## 2021-06-28 PROCEDURE — 99309 SBSQ NF CARE MODERATE MDM 30: CPT | Performed by: INTERNAL MEDICINE

## 2021-06-28 NOTE — PROGRESS NOTES
THIS IS NOT A COMPLETED MEDICAL RECORD ON THIS PATIENT. THIS IS A PATIENT OF [facility]   PLEASE CONTACT THE FACILITY BELOW FOR MORE INFORMATION ON THIS PATIENT   THE COMPLETE RECORD RESIDES WITH THIS LONG TERM CARE FACILITY    HISTORY OF PRESENT ILLNESS  Kajal Silva is a 67 y.o. male. Patient is under the care of Dr. Danilo Contreras at [facility].   Past medical history of alcohol abuse, substance abuse, depression, hypertension, and TIA. Patient was admitted to the hospital after having weakness and left sided droops. CT was negative, but thought to have encephalopathy. Oxygen saturation was low as well. Chest xray was negative. Ammonia level was high. Patient was treated with IV fluids and imperic antibiotics. Patient was seen after nursing reports change in AMS. Patient has been falling, but not change neurological wise until this morning. Nursing reports that as of this morning would have blank stares and his movements were getting agitated. Vitals were stable. Was eating some. HPI    Review of Systems   Unable to perform ROS: Acuity of condition       Physical Exam  Vitals and nursing note reviewed. Constitutional:       Appearance: He is not ill-appearing. HENT:      Head: Normocephalic. Eyes:      Pupils: Pupils are equal, round, and reactive to light. Cardiovascular:      Rate and Rhythm: Normal rate and regular rhythm. Pulmonary:      Effort: Pulmonary effort is normal.      Breath sounds: Normal breath sounds. Abdominal:      General: Bowel sounds are normal.      Palpations: Abdomen is soft. Skin:     General: Skin is warm. Neurological:      Mental Status: He is disoriented. Comments: Moving his legs and bed, will grab hands and had equal           ASSESSMENT and PLAN  Diagnoses and all orders for this visit:    1. Altered mental status, unspecified altered mental status type    2. Hepatic encephalopathy ([address]    3.  Type 2 diabetes mellitus without complication, with long-term current use of insulin ([facility]    4. Alcoholic cirrhosis of liver without ascites ([facility]        PLAN:  1. STAT labs. CMP, CBC, TSH, UA, ammonia and a1c  2. Encourage IV fluids   3. Patient son was spoken to about current patients status. Relayed he did not want sent out and was ok with just labs work.  Patient son came to visit later in the day and asked for the patient to be sent out now.     lab results and schedule of future lab studies reviewed with patient  reviewed medications and side effects in detail

## 2021-07-02 NOTE — ED NOTES
Bogdan Maher with transport called ED to update that she is still working on transport.
Emergency Department Nursing Plan of Care       The Nursing Plan of Care is developed from the Nursing assessment and Emergency Department Attending provider initial evaluation. The plan of care may be reviewed in the ED Provider note.     The Plan of Care was developed with the following considerations:   Patient / Family readiness to learn indicated by:verbalized understanding  Persons(s) to be included in education: patient  Barriers to Learning/Limitations:No    Signed     Lizabeth Lira, [facility]    12/30/2020   1:07 AM
IV and lab draw attempted x2 but unsuccessful. Patient to radiology.
Patient  given copy of dc instructions and 0 script(s). Patient  verbalized understanding of instructions and script (s). Patient given a current medication reconciliation form and verbalized understanding of their medications. Patient  verbalized understanding of the importance of discussing medications with  his or her physician or clinic they will be following up with. Patient alert and oriented and in no acute distress. Patient discharged via transport on stretcher. Brian Scherer
Patient c/o migraine headache and requesting Imitrex. MD notified and patient medicated per order.
Patient states he has had SOB, non-productive cough and chills x1 week. Respirations even and unlabored. Speaks in full sentences. Lungs CTA. Pulse ox 97%-99% on room air. Skin warm and dry. No distress noted. Patient also c/o pain in neck, knees, back and ribs due to falls over the past 6 weeks. No deformity or bruising noted. Patient resting quietly on stretcher. Will continue to monitor.
Patient to be discharged. Awaiting transportation arrangements. COVID swab collected and sent. Resting quietly without complaint. No distress noted.
Resting quietly on stretcher. Lights dim. Watching TV. No complaints voiced. Will continue to monitor.
Resting quietly with eyes closed. Remains awaiting transportation home.
Second nurse attempted lab draw and unsuccessful. RT paged for arterial stick for labs.
Transport here to take patient home.
No

## 2021-07-16 ENCOUNTER — EXTERNAL NURSING HOME DOCUMENTATION (OUTPATIENT)
Dept: INTERNAL MEDICINE CLINIC | Age: 76
End: 2021-07-16
Payer: MEDICARE

## 2021-07-16 DIAGNOSIS — K70.30 ALCOHOLIC CIRRHOSIS OF LIVER WITHOUT ASCITES (HCC): ICD-10-CM

## 2021-07-16 DIAGNOSIS — Z79.4 TYPE 2 DIABETES MELLITUS WITHOUT COMPLICATION, WITH LONG-TERM CURRENT USE OF INSULIN (HCC): ICD-10-CM

## 2021-07-16 DIAGNOSIS — E11.9 TYPE 2 DIABETES MELLITUS WITHOUT COMPLICATION, WITH LONG-TERM CURRENT USE OF INSULIN (HCC): ICD-10-CM

## 2021-07-16 DIAGNOSIS — K76.82 HEPATIC ENCEPHALOPATHY: ICD-10-CM

## 2021-07-16 DIAGNOSIS — F41.8 DEPRESSION WITH ANXIETY: ICD-10-CM

## 2021-07-16 DIAGNOSIS — R41.82 ALTERED MENTAL STATUS, UNSPECIFIED ALTERED MENTAL STATUS TYPE: Primary | ICD-10-CM

## 2021-07-16 DIAGNOSIS — E03.9 ACQUIRED HYPOTHYROIDISM: ICD-10-CM

## 2021-07-16 PROCEDURE — 99306 1ST NF CARE HIGH MDM 50: CPT | Performed by: INTERNAL MEDICINE

## 2021-07-16 NOTE — PROGRESS NOTES
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 He denies any fever. No nausea or vomiting. In the emergency room, he was found to have urinary tract infection and he was treated accordingly. His ammonia level was very high. He was placed on high dose of lactulose and rifaximin. Family would like to continue all the treatment, so he was continued all necessary treatment. He was progressively getting worse. GI was consulted. The patient was placed on hospice care because of end-stage liver disease. Medical treatment was continued. He was stabilized and was transferred to [facility]. He is here in the rehab. He is persistently confused and has frequent falls. He was on the floor this morning. No injury noticed, but he remains confused. Hospice is calling him. Past Medical History:  Depression, mood disorder, GERD, gastritis, liver cirrhosis, hypertension, TIA, hyperlipidemia, chronic headaches, history of hemorrhagic CVA, osteoporosis, and type 2 diabetes mellitus. Past Surgical History:  Not known. Family History:  Significant for hypertension, diabetes, and hyperlipidemia. Social History:  The patient had history of alcohol abuse. He used to drink beer every day. He is not smoking. The daughter is caregiver. He is right now DNR. Allergies:  He is not allergic to any medications. Medications:   The patient is on, right now, bisacodyl 10 mg one tablet one suppository per rectum as needed, hyoscyamine 0.125 mg one tablet sublingual every four hours, Tylenol 650 mg suppository one tablet every q.6h. as needed, lorazepam 2 mg/mL 0.25 mL q.4h. as needed, taking morphine 20 mg/mL 0.25 mL q.6h. as needed, Flomax 0.8 mg p.o. daily, lactulose 30 mg t.i.d., levothyroxine 50 mcg a day, Protonix 40 mg daily, mupirocin 2 g nasal, rifaximin 550 mg p.o. b.i.d. Review of System:  Complete review of system done. Right now significant for agitation and confusion. Physical Examination:  GENERAL:  This is a pleasant  male, confused. VITALS:  T:  96 degrees Fahrenheit. P:  80 per minute. BP:  107/58 mmHg. SaO2:  95% on room air. Weight is 217 pounds. HEENT:  No abnormality detected. NECK:  Supple, no JVD, no carotid bruits, no thyromegaly. CHEST:  Chest is clear to auscultation bilaterally. No rales, no rhonchi. CARDIOVASCULAR:  S1, S2 normal.  Regular rate and rhythm. ABDOMEN:  Soft, nontender, nondistended, bowel sounds present. EXTREMITIES:  No edema is noticed. Dorsalis pedis pulse normal.   NEUROLOGICAL:  Alert and oriented x2. The patient is confused, not agitated. Cranial nerves II through XII grossly intact. Motor 4/5 bilaterally. Able to move all extremities. Sensory seems within normal limits. Assessment and Plan:  1. Hepatic encephalopathy. The patient is still agitated. He is on Ativan sublingual every four hours as needed. He is on rifaximin and lactulose, not helping much. He is under hospice care. 2. Liver cirrhosis, probably alcoholic cirrhosis. even though he also has QUINN. He will continue therapy. He is in hospice care because of end-stage liver disease. 3. Hypothyroidism. The patient is on levothyroxine and will continue it. 4. GERD. He is on Protonix and will continue it. 5. Recent UTI. The patient seems to finish ceftriaxone. He is asymptomatic. 6. Type 2 diabetes mellitus, diet controlled. We will monitor sugars as needed. THIS IS NOT A COMPLETE MEDICAL RECORD ON THIS PATIENT.    THIS IS A PATIENT AT [facility].    PLEASE CONTACT THE FACILITY LISTED BELOW FOR MORE INFORMATION ON THIS PATIENT.    THE COMPLETE RECORD RESIDES WITH THIS LONG TERM CARE FACILITY

## 2021-07-23 ENCOUNTER — EXTERNAL NURSING HOME DOCUMENTATION (OUTPATIENT)
Dept: INTERNAL MEDICINE CLINIC | Age: 76
End: 2021-07-23
Payer: MEDICARE

## 2021-07-23 DIAGNOSIS — F41.8 DEPRESSION WITH ANXIETY: ICD-10-CM

## 2021-07-23 DIAGNOSIS — Z79.4 TYPE 2 DIABETES MELLITUS WITHOUT COMPLICATION, WITH LONG-TERM CURRENT USE OF INSULIN (HCC): ICD-10-CM

## 2021-07-23 DIAGNOSIS — E03.9 ACQUIRED HYPOTHYROIDISM: ICD-10-CM

## 2021-07-23 DIAGNOSIS — K76.82 HEPATIC ENCEPHALOPATHY: Primary | ICD-10-CM

## 2021-07-23 DIAGNOSIS — K70.30 ALCOHOLIC CIRRHOSIS OF LIVER WITHOUT ASCITES (HCC): ICD-10-CM

## 2021-07-23 DIAGNOSIS — E11.9 TYPE 2 DIABETES MELLITUS WITHOUT COMPLICATION, WITH LONG-TERM CURRENT USE OF INSULIN (HCC): ICD-10-CM

## 2021-07-23 PROCEDURE — 99309 SBSQ NF CARE MODERATE MDM 30: CPT | Performed by: INTERNAL MEDICINE

## 2021-07-23 NOTE — PROGRESS NOTES
Progress Note     Subjective:  Mr. Domingo Taylor is in a nursing home. He is still confused. Has hepatic encephalopathy. He is constipated. I have started him on lactulose, which is working. He has frequent fall. Right now his bed is all the way down to the floor and he is on scallop bed. No more falling. No other discomfort noted. Objective:    VITALS:  T:  97.3 degrees Fahrenheit. P:  94 per minute. BP:  129/76 mmHg. SaO2:  95% on room air. Weight is 217 pounds. HEENT:  No abnormality detected. NECK:  Supple, no JVD, no carotid bruits, no thyromegaly. CHEST:  Chest is clear to auscultation bilaterally. No rales, no rhonchi. CARDIOVASCULAR:  S1, S2 normal.  Regular rate and rhythm. ABDOMEN:  Soft, nontender, nondistended, bowel sounds present. EXTREMITIES:  No edema is noted. Dorsalis pedis pulse normal.    NEUROLOGICAL:  Alert and oriented x2. The patient is still confused. Assessment and Plan:   1. Hepatic encephalopathy. The patient is on lactulose round the clock. He is not agitated. He is on Ativan sublingual as needed throughout his care. We will continue him on lactulose. 2. Hypothyroid. The patient is on levothyroxine. I will continue it. 3. Liver cirrhosis. Combination of alcoholic cirrhosis and also QUINN. The patient is in hospice care. He has end-stage liver disease. 4. Type 2 diabetes mellitus. Diet controlled. We will monitor rapid sugar. 5. GERD. The patient is on Protonix. We will continue it. THIS IS NOT A COMPLETE MEDICAL RECORD ON THIS PATIENT.    THIS IS A PATIENT AT [facility].    PLEASE CONTACT THE FACILITY LISTED BELOW FOR MORE INFORMATION ON THIS PATIENT.    THE COMPLETE RECORD RESIDES WITH THIS LONG TERM CARE FACILITY
